# Patient Record
Sex: MALE | Race: WHITE | Employment: OTHER | URBAN - METROPOLITAN AREA
[De-identification: names, ages, dates, MRNs, and addresses within clinical notes are randomized per-mention and may not be internally consistent; named-entity substitution may affect disease eponyms.]

---

## 2017-01-02 ENCOUNTER — THERAPY VISIT (OUTPATIENT)
Dept: PHYSICAL THERAPY | Facility: CLINIC | Age: 57
End: 2017-01-02
Payer: OTHER MISCELLANEOUS

## 2017-01-02 DIAGNOSIS — M25.412 EFFUSION, LEFT SHOULDER: ICD-10-CM

## 2017-01-02 DIAGNOSIS — G89.18 ACUTE POSTOPERATIVE PAIN OF LEFT SHOULDER: ICD-10-CM

## 2017-01-02 DIAGNOSIS — M25.512 ACUTE PAIN OF LEFT SHOULDER: Primary | ICD-10-CM

## 2017-01-02 DIAGNOSIS — M25.512 ACUTE POSTOPERATIVE PAIN OF LEFT SHOULDER: ICD-10-CM

## 2017-01-02 PROCEDURE — 97110 THERAPEUTIC EXERCISES: CPT | Mod: GP | Performed by: PHYSICAL THERAPY ASSISTANT

## 2017-01-03 ENCOUNTER — TELEPHONE (OUTPATIENT)
Dept: FAMILY MEDICINE | Facility: CLINIC | Age: 57
End: 2017-01-03

## 2017-01-03 DIAGNOSIS — Z12.11 SCREEN FOR COLON CANCER: Primary | ICD-10-CM

## 2017-01-03 NOTE — TELEPHONE ENCOUNTER
Called the patient for Health Maintenance, agreed to FIT test screening. Order placed for provider to sign.     The patient request FIT be sent to home:  [address] [place] [address]

## 2017-01-04 PROCEDURE — 99000 SPECIMEN HANDLING OFFICE-LAB: CPT | Performed by: FAMILY MEDICINE

## 2017-01-04 PROCEDURE — 82274 ASSAY TEST FOR BLOOD FECAL: CPT | Mod: 90 | Performed by: FAMILY MEDICINE

## 2017-01-06 ENCOUNTER — THERAPY VISIT (OUTPATIENT)
Dept: PHYSICAL THERAPY | Facility: CLINIC | Age: 57
End: 2017-01-06
Payer: OTHER MISCELLANEOUS

## 2017-01-06 DIAGNOSIS — M25.412 EFFUSION, LEFT SHOULDER: ICD-10-CM

## 2017-01-06 DIAGNOSIS — Z12.11 SCREEN FOR COLON CANCER: ICD-10-CM

## 2017-01-06 DIAGNOSIS — M25.512 ACUTE POSTOPERATIVE PAIN OF LEFT SHOULDER: ICD-10-CM

## 2017-01-06 DIAGNOSIS — G89.18 ACUTE POSTOPERATIVE PAIN OF LEFT SHOULDER: ICD-10-CM

## 2017-01-06 DIAGNOSIS — M25.512 ACUTE PAIN OF LEFT SHOULDER: Primary | ICD-10-CM

## 2017-01-06 LAB — HEMOCCULT STL QL IA: NEGATIVE

## 2017-01-06 PROCEDURE — 97110 THERAPEUTIC EXERCISES: CPT | Mod: GP | Performed by: PHYSICAL THERAPIST

## 2017-01-06 NOTE — PROGRESS NOTES
"Subjective:    HPI                    Objective:    System    Physical Exam    General     ROS    Assessment/Plan:      SUBJECTIVE  Subjective: Pt states nothing more than an occasional \"throb,\" which does seem to be getting better.  Ice seems quite helpful.   Current Pain level: 3/10   Changes in function:  None     Adverse reaction to treatment or activity:  None    OBJECTIVE  Objective: PROM scaption 130, ER 30 (both limited by protocol).     ASSESSMENT  Joshua continues to require intervention to meet STG and LTG's: PT  Patient is progressing as expected.  Response to therapy has shown an improvement in  ROM   Progress made towards STG/LTG?  None    PLAN  Continue current treatment until MD allows progression of the treatment plan, which would be anticipated once the six week janet is passed next week.    PTA/ATC plan:  N/A    Please refer to the daily flowsheet for treatment today, total treatment time and time spent performing 1:1 timed codes.              "

## 2017-01-09 ENCOUNTER — THERAPY VISIT (OUTPATIENT)
Dept: PHYSICAL THERAPY | Facility: CLINIC | Age: 57
End: 2017-01-09
Payer: OTHER MISCELLANEOUS

## 2017-01-09 DIAGNOSIS — M25.512 ACUTE PAIN OF LEFT SHOULDER: Primary | ICD-10-CM

## 2017-01-09 DIAGNOSIS — M25.412 EFFUSION, LEFT SHOULDER: ICD-10-CM

## 2017-01-09 DIAGNOSIS — G89.18 ACUTE POSTOPERATIVE PAIN OF LEFT SHOULDER: ICD-10-CM

## 2017-01-09 DIAGNOSIS — M25.512 ACUTE POSTOPERATIVE PAIN OF LEFT SHOULDER: ICD-10-CM

## 2017-01-09 PROCEDURE — 97110 THERAPEUTIC EXERCISES: CPT | Mod: GP | Performed by: PHYSICAL THERAPIST

## 2017-01-12 ENCOUNTER — OFFICE VISIT (OUTPATIENT)
Dept: ORTHOPEDICS | Facility: CLINIC | Age: 57
End: 2017-01-12
Payer: OTHER MISCELLANEOUS

## 2017-01-12 ENCOUNTER — THERAPY VISIT (OUTPATIENT)
Dept: PHYSICAL THERAPY | Facility: CLINIC | Age: 57
End: 2017-01-12
Payer: OTHER MISCELLANEOUS

## 2017-01-12 VITALS — BODY MASS INDEX: 29.72 KG/M2 | HEIGHT: 72 IN | WEIGHT: 219.4 LBS | RESPIRATION RATE: 16 BRPM

## 2017-01-12 DIAGNOSIS — M25.512 ACUTE POSTOPERATIVE PAIN OF LEFT SHOULDER: ICD-10-CM

## 2017-01-12 DIAGNOSIS — G89.18 ACUTE POSTOPERATIVE PAIN OF LEFT SHOULDER: ICD-10-CM

## 2017-01-12 DIAGNOSIS — Z98.890 S/P COMPLETE REPAIR OF ROTATOR CUFF: Primary | ICD-10-CM

## 2017-01-12 DIAGNOSIS — M25.512 ACUTE PAIN OF LEFT SHOULDER: Primary | ICD-10-CM

## 2017-01-12 DIAGNOSIS — M25.412 EFFUSION, LEFT SHOULDER: ICD-10-CM

## 2017-01-12 PROCEDURE — 99024 POSTOP FOLLOW-UP VISIT: CPT | Performed by: ORTHOPAEDIC SURGERY

## 2017-01-12 PROCEDURE — 97110 THERAPEUTIC EXERCISES: CPT | Mod: GP | Performed by: PHYSICAL THERAPIST

## 2017-01-12 RX ORDER — OXYCODONE AND ACETAMINOPHEN 5; 325 MG/1; MG/1
1 TABLET ORAL EVERY 8 HOURS PRN
Qty: 40 TABLET | Refills: 0 | Status: SHIPPED | OUTPATIENT
Start: 2017-01-12 | End: 2017-02-20

## 2017-01-12 ASSESSMENT — PAIN SCALES - GENERAL: PAINLEVEL: NO PAIN (1)

## 2017-01-12 NOTE — MR AVS SNAPSHOT
After Visit Summary   1/12/2017    Joshua Gill    MRN: [id number]           Patient Information     Date Of Birth          1960        Visit Information        Provider Department      1/12/2017 3:30 PM Guru Motta MD [facility] [place]        Today's Diagnoses     S/P complete repair of rotator cuff    -  1       Care Instructions    Please remember to call and schedule a follow up appointment if one was recommended at your earliest convenience.  Orthopedics CLINIC HOURS TELEPHONE NUMBER   Dr. Kalia Melchor  Certified Medical Assistant   Monday & Wednesday   8am - 5pm  Thursday 1pm - 5pm  Friday 8am -11:30am Specialty schedulers:   [phone] to schedule your surgery.  Main Clinic:   [phone] to make an appointment with any provider.    Urgent Care locations:    [facility] Monday-Friday Closed  Saturday-Sunday 9am-5pm      Monday-Friday 12pm - 8pm  Saturday-Sunday 9am-5pm [phone]     If SURGERY has been recommended, please call our Specialty Schedulers at [phone] to schedule your procedure.    If you need a medication refill, please contact your pharmacy. Please allow 3 business days for your refill to be completed.    If an MRI or CT scan has been recommended, please call [facility] Schedulers at [phone] to schedule your appointment.  Use [employer] (secure e-mail communication and access to your chart) to send a message or to make an appointment. Please ask how you can sign up for [employer].  Your care team's suggested websites for health information:   [email or web address] : Up to date and easily searchable information on multiple topics.   [email or web address] : [place] dept of heatl, public health issues in [place], N1N1            Follow-ups after your visit        Additional Services     [facility] PT, HAND, AND CHIROPRACTIC REFERRAL       **This order will print in the [facility]  Scheduling Office**    Physical Therapy, Hand Therapy and Chiropractic Care are available through:    *[facility]  *[facility]  *[facility]    Call one number to schedule at any of the above locations: [phone].    Your provider has referred you to: Physical Therapy at [facility] or [facility]    Indication/Reason for Referral: s/p left shoulder rotator cuff repair, distal clavicle resection, subacromial decompression, labral debridement, biceps tenodesis   Onset of Illness: 12/1/16  Therapy Orders: Per Protocol or Clinical Pathway  Special Programs: None  Special Request: None    Sukhdeep Tarango      Additional Comments for the Therapist or Chiropractor: aggressive range of motion     Please be aware that coverage of these services is subject to the terms and limitations of your health insurance plan.  Call member services at your health plan with any benefit or coverage questions.      Please bring the following to your appointment:    *Your personal calendar for scheduling future appointments  *Comfortable clothing                  Your next 10 appointments already scheduled     Jan 16, 2017  9:30 AM   JONG Extremity with Fermin Grayson PTA   [facility] [place] [facility] ([facility] [place])    [address]  [place] [address]   [phone]            Jan 19, 2017 10:40 AM   JONG Extremity with Fermin Grayson PTA   [facility] [place] [facility] ([facility] [place])    [address]  [place] [address]   [phone]            Jan 23, 2017 10:45 AM   JONG Extremity with Avery Coffman PT   [facility] [place] [facility] ([facility] [place])    [address]  [place] [address]   [phone]            Jan 26, 2017 10:40 AM   JONG Extremity with Fermin Grayson PTA   [facility] [place] [facility] ([facility] [place])    [address]  [address]  [place] [address]   [phone]            Jan 30, 2017 10:50 AM   JONG Extremity with Fermin Grayson PTA   [facility] [place] [facility] ([facility] [place])    [address]  [address]  [place] [address]   [phone]            Feb 02, 2017 10:45 AM   JONG Extremity with Avery Coffman PT   [facility] [place] [facility] ([facility] [place])    [address]  [place] [address]   [phone]            Feb 06, 2017 10:50 AM   JONG Extremity with Fermin Grayson PTA   [facility] [place] [facility] ([facility] [place])    [address]  [place] [address]   [phone]            Feb 09, 2017 10:45 AM   JONG Extremity with Avery Coffman PT   [facility] [place] [facility] ([facility] [place])    [address]  [place] [address]   [phone]            Feb 23, 2017  3:30 PM   Return Visit with Guru Motta MD   [facility] [place] ([facility] [place])    [address]  [place] [address]   [phone]              Who to contact     If you have questions or need follow up information about today's clinic visit or your schedule please contact [facility] [place] directly at [phone].  Normal or non-critical lab and imaging results will be communicated to you by Pl[employer], letter or phone within 4 business days after the clinic has received the results. If you do not hear from us within 7 days, please contact the clinic through Plurchase[employer] or phone. If you have a critical or abnormal lab result, we will notify you by phone as soon as possible.  Submit refill requests through [employer] or call your pharmacy and they will forward the refill request to us. Please allow 3 business days for your refill to be completed.          Additional Information About Your Visit        [employer]  "Information     [employer] lets you send messages to your doctor, view your test results, renew your prescriptions, schedule appointments and more. To sign up, go to [email or web address] . Click on \"Log in\" on the left side of the screen, which will take you to the Welcome page. Then click on \"Sign up Now\" on the right side of the page.     You will be asked to enter the access code listed below, as well as some personal information. Please follow the directions to create your username and password.     Your access code is: [email or web address]  Expires: 3/1/2017 10:24 AM     Your access code will  in 90 days. If you need help or a new code, please call your [facility] clinic or [phone].        Care EveryWhere ID     This is your Care EveryWhere ID. This could be used by other organizations to access your [facility] medical records  [id number]        Your Vitals Were     Respirations Height BMI (Body Mass Index)             16 1.816 m (5' 11.5\") 30.18 kg/m2          Blood Pressure from Last 3 Encounters:   16 117/78   16 127/73   16 122/68    Weight from Last 3 Encounters:   17 99.519 kg (219 lb 6.4 oz)   12/15/16 95.255 kg (210 lb)   16 96.163 kg (212 lb)              We Performed the Following     [facility] PT, HAND, AND CHIROPRACTIC REFERRAL          Today's Medication Changes          These changes are accurate as of: 17  4:36 PM.  If you have any questions, ask your nurse or doctor.               These medicines have changed or have updated prescriptions.        Dose/Directions    oxyCODONE-acetaminophen 5-325 MG per tablet   Commonly known as:  PERCOCET   This may have changed:    - how much to take  - when to take this  - additional instructions   Used for:  S/P complete repair of rotator cuff   Changed by:  Guru Motta MD        Dose:  1 tablet   Take 1 tablet by mouth every 8 hours as needed for pain maximum 3 tablet(s) per day   Quantity:  40 tablet   Refills:  0       "      Where to get your medicines      Some of these will need a paper prescription and others can be bought over the counter.  Ask your nurse if you have questions.     Bring a paper prescription for each of these medications    - oxyCODONE-acetaminophen 5-325 MG per tablet             Primary Care Provider Office Phone # Fax #    Vinny Singh [phone]       [facility] [address] [address]        Thank you!     Thank you for choosing [facility]  for your care. Our goal is always to provide you with excellent care. Hearing back from our patients is one way we can continue to improve our services. Please take a few minutes to complete the written survey that you may receive in the mail after your visit with us. Thank you!             Your Updated Medication List - Protect others around you: Learn how to safely use, store and throw away your medicines at www.disposemymeds.org.          This list is accurate as of: 1/12/17  4:36 PM.  Always use your most recent med list.                   Brand Name Dispense Instructions for use    methocarbamol 750 MG tablet    ROBAXIN    60 tablet    Take 1 tablet (750 mg) by mouth every 6 hours as needed for muscle spasms (muscle spasm)       oxyCODONE-acetaminophen 5-325 MG per tablet    PERCOCET    40 tablet    Take 1 tablet by mouth every 8 hours as needed for pain maximum 3 tablet(s) per day       senna-docusate 8.6-50 MG per tablet    SENOKOT-S;PERICOLACE    60 tablet    Take 1-2 tablets by mouth 2 times daily Take while on oral narcotics to prevent or treat constipation.

## 2017-01-12 NOTE — Clinical Note
[facility] [place]  [address]  [place] [address] [phone]  WORKABILITY    [facility] Louie M.D. [place]        1/12/2017      RE: Joshua Gill    [address] [address]        To whom it may concern:     Joshua Gill is under my care for   1. S/P complete repair of rotator cuff        Work related injury: Yes       Date of injury: 8/8/16      Date of surgery: 12/1/16    Return to work date: 1/23/16   ** WITH RESTRICTIONS? Yes, with work restrictions: * Other: Light duties. No use of left arm, strict. No lifting reaching, pulling, pushing. Full non weightbearing with left arm.  DURATION OF LIMITATIONS:  6 weeks        Next appointment: 6 weeks        Electronically Signed (as below)  Dr. Guru Motta M.D.

## 2017-01-12 NOTE — PATIENT INSTRUCTIONS
Please remember to call and schedule a follow up appointment if one was recommended at your earliest convenience.  Orthopedics CLINIC HOURS TELEPHONE NUMBER   Dr. Kalia Melchor  Certified Medical Assistant   Monday & Wednesday   8am - 5pm  Thursday 1pm - 5pm  Friday 8am -11:30am Specialty schedulers:   [phone] to schedule your surgery.  Main Clinic:   [phone] to make an appointment with any provider.    Urgent Care locations:    [facility] Monday-Friday Closed  Saturday-Sunday 9am-5pm      Monday-Friday 12pm - 8pm  Saturday-Sunday 9am-5pm [phone]     If SURGERY has been recommended, please call our Specialty Schedulers at [phone] to schedule your procedure.    If you need a medication refill, please contact your pharmacy. Please allow 3 business days for your refill to be completed.    If an MRI or CT scan has been recommended, please call [facility] Schedulers at [phone] to schedule your appointment.  Use [employer] (secure e-mail communication and access to your chart) to send a message or to make an appointment. Please ask how you can sign up for [employer].  Your care team's suggested websites for health information:   [email or web address] : Up to date and easily searchable information on multiple topics.   [email or web address] : [place] dept of heat, public health issues in [place], N1N1

## 2017-01-12 NOTE — PROGRESS NOTES
CHIEF COMPLAINT:   Chief Complaint   Patient presents with     Surgical Followup     Left shoulder RCR, DCR, SAD, labral debridement, biceps tenodesis. DOS 12/1/16, 6 week PO. Patient states his shoulder is feeling sore but it is getting better. He has not been moving it. Therapist said it was pretty tight. He has been going to PT, 2 times a week, going well. He still has a problem sleeping. He takes 2 pain pills at night and extra strength tylenol during the day.      SURGICAL PROCEDURE: Left shoulder Rotator cuff repair - medium repair  1.  Left shoulder arthroscopy with rotator cuff repair, medium repair, double row technique.    2.  Left shoulder arthroscopic distal clavicle resection.    3.  Left shoulder arthroscopic proximal biceps tenodesis.    4.  Left shoulder arthroscopic subacromial decompression with partial acromioplasty.    5.  Left shoulder arthroscopic labral debridement.   DATE OF PROCEDURE: 12/1/2016      HISTORY OF PRESENT ILLNESS    Joshua Gill is a 56 year old male seen for postoperative follow-up of a left shoulder rotator cuff repair  Medium repair that occurred 6 weeks ago. Since surgery, patient states his shoulder is feeling a bit sore, but has been getting much better, rated a 1/10. He has not been moving it. The physical therapist said it was pretty stiff. Has been going to physical therapy, two times a week. Continued pain at night. Denies fevers, chills, night sweats. No wound problems. Compliant with weight bearing restrictions and elevation. There have been no issues since surgery. Denies numbness or tingling. Patient is taking 2 Percocet at night and Extra strength Tylenol during the day.    Present symptoms: mild pain.  Stiffness.  Pain severity: 1/10  Pain quality: aching  Frequency of symptoms: frequently  Exacerbating Factors: rotation and movement of shoulder  Relieving Factors: rest  Night Pain: Yes  Pain while at rest: No   Associated numbness or tingling: No     OR  FINDINGS:  Medium-sized tear of the supraspinatus with mild retraction.  Proximal biceps tendinosis with partial tearing, less than 25%, with medial subluxation.  Partial tearing of the superior fibers of the subscapularis with the remainder intact.  Type 1 SLAP tear.  Mild to moderate glenohumeral synovitis.  Mild glenohumeral arthrosis.  Moderate acromioclavicular arthrosis with prominent subacromial and subclavicular bone spurs and a type 3 acromion.  Thickened subacromial bursa.     Orthopedic PMH: none    Other PMH:  has a past medical history of No pertinent past medical history; PONV (postoperative nausea and vomiting); and Motion sickness. He also has no past medical history of Amblyopia, Arthritis, Nonsenile cataract, Diabetes (H), Diabetic retinopathy (H), Glaucoma, Hypertension, Macular degeneration, Retinal detachment, Strabismus, or Uveitis.  Patient Active Problem List    Diagnosis Date Noted     Left shoulder pain 12/10/2016     Priority: Medium     Effusion, left shoulder 12/10/2016     Priority: Medium     Acute postoperative pain of left shoulder 12/10/2016     Priority: Medium     Complete tear of left rotator cuff 10/10/2016     Priority: Medium     Tinea corporis 12/22/2014     SLAP tear of shoulder 10/27/2014     Impingement syndrome of right shoulder 04/29/2014     CARDIOVASCULAR SCREENING; LDL GOAL LESS THAN 160 03/02/2013     Situational anxiety 03/02/2013       Medications:   Current outpatient prescriptions:      oxyCODONE-acetaminophen (PERCOCET) 5-325 MG per tablet, Take 1-2 tablets by mouth every 4 hours as needed for pain maximum 10 tablet(s) per day, Disp: 60 tablet, Rfl: 0     methocarbamol (ROBAXIN) 750 MG tablet, Take 1 tablet (750 mg) by mouth every 6 hours as needed for muscle spasms (muscle spasm), Disp: 60 tablet, Rfl: 0     senna-docusate (SENOKOT-S;PERICOLACE) 8.6-50 MG per tablet, Take 1-2 tablets by mouth 2 times daily Take while on oral narcotics to prevent or treat  "constipation., Disp: 60 tablet, Rfl: 1    Allergies: No Known Allergies    REVIEW OF SYSTEMS:  CONSTITUTIONAL:NEGATIVE for fever, chills, change in weight  INTEGUMENTARY/SKIN: NEGATIVE for worrisome rashes, moles or lesions  MUSCULOSKELETAL:See HPI above  NEURO: NEGATIVE for weakness, dizziness or paresthesias      PHYSICAL EXAM:  Resp 16  Ht 1.816 m (5' 11.5\")  Wt 99.519 kg (219 lb 6.4 oz)  BMI 30.18 kg/m2   GENERAL APPEARANCE: healthy, alert, no distress   SKIN: no suspicious lesions or rashes  NEURO: Normal strength and tone, mentation intact and speech normal  PSYCH:  mentation appears normal and affect normal/bright  RESPIRATORY: No increased work of breathing.      left UPPER EXTREMITY:  Inspection: incisions healed.  Inspection: no swelling, no ecchymosis, no deformity  Palpation: tender over incisions  Strength: grossly intact , weak.  Range of motion: forward flexion 100 degrees, external rotation 30 degrees passive.    Sensation intact to light touch in median, radial, ulnar and axillary nerve distributions  Palpable 2+ radial pulse, brisk capillary refill to all fingers, wwp  Intact epl fpl fdp edc wrist flexion/extension biceps triceps deltoid    X-RAY:  none reviewed today.    Impression: 56 year old male 6 weeks postoperative left shoulder rotator cuff repair - medium repair, doing well.    Plan:  * Rest  * Activity modification - avoid activities that aggravate symptoms; No grabbing pushing, pulling, reaching.  * non weight bearing left upper extremity.  * NSAIDS - regular use for inflammation, with food, as long as no contra-indications. Please discuss with pcp if needed.  * Ice twice daily to three times daily.  * Physical Therapy per medium repair/tenodesis protocol for aggressive motion. No restrictions on range of motion.  * Prescription filled today: perocet  * Tylenol as needed for pain  * Workability update: able to return to work on 1/23/2017 with light duties, strict restriction of no " duties of the left arm.  * Return to clinic in 6 week, sooner as needed.    This document serves as a record of the services and decisions personally performed and made by Guru Motta MD. It was created on his behalf by Heaven Barcenas, a trained medical scribe. The creation of this document is based the provider's statements to the medical scribe.    Scribe Heaven Barcenas 4:05 PM 1/12/2017       Guru Motta M.D., M.S.  Dept. of Orthopaedic Surgery  [facility]

## 2017-01-12 NOTE — NURSING NOTE
"Chief Complaint   Patient presents with     Surgical Followup     Left shoulder RCR, DCR, SAD, labral debridement, biceps tenodesis. DOS 12/1/16, 6 week PO. Patient states his shoulder is feeling sore but it is getting better. He has not been moving it. Therapist said it was pretty tight. He has been going to PT, 2 times a week, going well. He still has a problem sleeping. He takes 2 pain pills at night and extra strength tylenol during the day.        Initial Resp 16  Ht 1.816 m (5' 11.5\")  Wt 99.519 kg (219 lb 6.4 oz)  BMI 30.18 kg/m2 Estimated body mass index is 30.18 kg/(m^2) as calculated from the following:    Height as of this encounter: 1.816 m (5' 11.5\").    Weight as of this encounter: 99.519 kg (219 lb 6.4 oz).  BP completed using cuff size: NA (Not Taken)  Jill Baires Certified Medical Assistant    "

## 2017-01-13 ENCOUNTER — TELEPHONE (OUTPATIENT)
Dept: PHYSICAL THERAPY | Facility: CLINIC | Age: 57
End: 2017-01-13

## 2017-01-13 DIAGNOSIS — M25.512 ACUTE PAIN OF LEFT SHOULDER: Primary | ICD-10-CM

## 2017-01-13 DIAGNOSIS — G89.18 ACUTE POSTOPERATIVE PAIN OF LEFT SHOULDER: ICD-10-CM

## 2017-01-13 DIAGNOSIS — M25.512 ACUTE POSTOPERATIVE PAIN OF LEFT SHOULDER: ICD-10-CM

## 2017-01-13 DIAGNOSIS — M25.412 EFFUSION, LEFT SHOULDER: ICD-10-CM

## 2017-01-13 NOTE — PROGRESS NOTES
"Subjective:    HPI                    Objective:    System    Physical Exam    General     ROS    Assessment/Plan:      PROGRESS  REPORT    Progress reporting period is from 12/10/2016 to today.       SUBJECTIVE  Subjective: Pt reports he continues to do well.  Although still taking pain medication, is not noting \"significant\" discomfort.  Eager to be done with the sling.    Current Pain level: 1/10.     Initial Pain level: 3/10.   Changes in function:  None  Adverse reaction to treatment or activity: None    OBJECTIVE  Objective: PROM flexion 118, scaption 132, abduction 117, ER 35, IR L4.     ASSESSMENT/PLAN  Updated problem list and treatment plan: Diagnosis 1:  S/p cuff repair  Pain -  hot/cold therapy  Decreased ROM/flexibility - manual therapy and therapeutic exercise  Decreased joint mobility - manual therapy and therapeutic exercise  Decreased function - therapeutic activities  STG/LTGs have been met or progress has been made towards goals:  Yes (See Goal flow sheet completed today.)  Assessment of Progress: The patient's condition is improving.  Self Management Plans:  Patient has been instructed in a home treatment program.  I have re-evaluated this patient and find that the nature, scope, duration and intensity of the therapy is appropriate for the medical condition of the patient.  Joshua continues to require the following intervention to meet STG and LTG's:  PT    Recommendations:  Pt has reached six weeks post operatively.  Moved to next stage of protocol today and provided verbal update to Dr Motta re: limited ROM as noted above.  Anticipate progression with focus on terminal ROM unless advised otherwise.    Please refer to the daily flowsheet for treatment today, total treatment time and time spent performing 1:1 timed codes.                "

## 2017-01-16 ENCOUNTER — THERAPY VISIT (OUTPATIENT)
Dept: PHYSICAL THERAPY | Facility: CLINIC | Age: 57
End: 2017-01-16
Payer: OTHER MISCELLANEOUS

## 2017-01-16 DIAGNOSIS — M25.512 ACUTE PAIN OF LEFT SHOULDER: Primary | ICD-10-CM

## 2017-01-16 DIAGNOSIS — G89.18 ACUTE POSTOPERATIVE PAIN OF LEFT SHOULDER: ICD-10-CM

## 2017-01-16 DIAGNOSIS — M25.412 EFFUSION, LEFT SHOULDER: ICD-10-CM

## 2017-01-16 DIAGNOSIS — M25.512 ACUTE POSTOPERATIVE PAIN OF LEFT SHOULDER: ICD-10-CM

## 2017-01-16 PROCEDURE — 97110 THERAPEUTIC EXERCISES: CPT | Mod: GP | Performed by: PHYSICAL THERAPY ASSISTANT

## 2017-01-16 NOTE — PROGRESS NOTES
Subjective:    HPI                    Objective:    System    Physical Exam    General     ROS    Assessment/Plan:      SUBJECTIVE  Subjective changes as noted by pt:  Pt saw his MD last week and pt hopes to be RTW with some left arm movement. He knows the MD has not allowed that yet. But, pt is feeling he could be doing more with the left arm once PT/MD allows him to. No increased pain noted by pt in the left arm.    Current pain level:  1/10   Changes in function:  Yes (See Goal flowsheet attached for changes in current functional level)     Adverse reaction to treatment or activity:  None    OBJECTIVE  Changes in objective findings:  Pt is 6+ wks post-op. PROM: left shoulder flexion 121, abd 121, ER 39, IR L3.  HEP: added isometrics for left shoulder all 6 motions today.      ASSESSMENT  Joshua continues to require intervention to meet STG and LTG's: PT  Patient is progressing as expected.  Response to therapy has shown an improvement in  ROM   Progress made towards STG/LTG?  Yes (See Goal flowsheet attached for updates on achievement of STG and LTG)    PLAN  Continue current treatment plan until patient demonstrates readiness to progress to higher level exercises.    PTA/ATC plan:  Will continue with present plan of care.    Please refer to the daily flowsheet for treatment today, total treatment time and time spent performing 1:1 timed codes.

## 2017-01-19 ENCOUNTER — THERAPY VISIT (OUTPATIENT)
Dept: PHYSICAL THERAPY | Facility: CLINIC | Age: 57
End: 2017-01-19
Payer: OTHER MISCELLANEOUS

## 2017-01-19 DIAGNOSIS — M25.412 EFFUSION, LEFT SHOULDER: ICD-10-CM

## 2017-01-19 DIAGNOSIS — M25.512 ACUTE PAIN OF LEFT SHOULDER: Primary | ICD-10-CM

## 2017-01-19 DIAGNOSIS — M25.512 ACUTE POSTOPERATIVE PAIN OF LEFT SHOULDER: ICD-10-CM

## 2017-01-19 DIAGNOSIS — G89.18 ACUTE POSTOPERATIVE PAIN OF LEFT SHOULDER: ICD-10-CM

## 2017-01-19 PROCEDURE — 97110 THERAPEUTIC EXERCISES: CPT | Mod: GP | Performed by: PHYSICAL THERAPY ASSISTANT

## 2017-01-23 ENCOUNTER — THERAPY VISIT (OUTPATIENT)
Dept: PHYSICAL THERAPY | Facility: CLINIC | Age: 57
End: 2017-01-23
Payer: OTHER MISCELLANEOUS

## 2017-01-23 DIAGNOSIS — M25.412 EFFUSION, LEFT SHOULDER: ICD-10-CM

## 2017-01-23 DIAGNOSIS — M25.512 ACUTE PAIN OF LEFT SHOULDER: Primary | ICD-10-CM

## 2017-01-23 DIAGNOSIS — M25.512 ACUTE POSTOPERATIVE PAIN OF LEFT SHOULDER: ICD-10-CM

## 2017-01-23 DIAGNOSIS — G89.18 ACUTE POSTOPERATIVE PAIN OF LEFT SHOULDER: ICD-10-CM

## 2017-01-23 PROCEDURE — 97110 THERAPEUTIC EXERCISES: CPT | Mod: GP | Performed by: PHYSICAL THERAPIST

## 2017-01-23 PROCEDURE — 97140 MANUAL THERAPY 1/> REGIONS: CPT | Mod: GP | Performed by: PHYSICAL THERAPIST

## 2017-01-23 NOTE — PROGRESS NOTES
Subjective:    HPI                    Objective:    System    Physical Exam    General     ROS    Assessment/Plan:      SUBJECTIVE  Subjective: Pt reports generally doing OK.  Hasn't returned to work yet but targeting soon.  Sore sleeping only if lays on L side.   Current Pain level: 1/10   Changes in function:  None     Adverse reaction to treatment or activity:  None    OBJECTIVE  Objective: PROM flexion 134, abduction 107, ER 45, IR L1.     ASSESSMENT  Joshua continues to require intervention to meet STG and LTG's: PT  Would like to see greater PROM progress as protocol would base advancing to AROM on this.  Progress made towards STG/LTG?  None    PLAN  Continue current treatment plan until patient demonstrates readiness to progress to higher level exercises.    PTA/ATC plan:  N/A    Please refer to the daily flowsheet for treatment today, total treatment time and time spent performing 1:1 timed codes.

## 2017-01-26 ENCOUNTER — THERAPY VISIT (OUTPATIENT)
Dept: PHYSICAL THERAPY | Facility: CLINIC | Age: 57
End: 2017-01-26
Payer: OTHER MISCELLANEOUS

## 2017-01-26 DIAGNOSIS — M25.512 ACUTE POSTOPERATIVE PAIN OF LEFT SHOULDER: ICD-10-CM

## 2017-01-26 DIAGNOSIS — M25.512 ACUTE PAIN OF LEFT SHOULDER: Primary | ICD-10-CM

## 2017-01-26 DIAGNOSIS — G89.18 ACUTE POSTOPERATIVE PAIN OF LEFT SHOULDER: ICD-10-CM

## 2017-01-26 DIAGNOSIS — M25.412 EFFUSION, LEFT SHOULDER: ICD-10-CM

## 2017-01-26 PROCEDURE — 97140 MANUAL THERAPY 1/> REGIONS: CPT | Mod: GP | Performed by: PHYSICAL THERAPY ASSISTANT

## 2017-01-26 PROCEDURE — 97110 THERAPEUTIC EXERCISES: CPT | Mod: GP | Performed by: PHYSICAL THERAPY ASSISTANT

## 2017-01-30 ENCOUNTER — THERAPY VISIT (OUTPATIENT)
Dept: PHYSICAL THERAPY | Facility: CLINIC | Age: 57
End: 2017-01-30
Payer: OTHER MISCELLANEOUS

## 2017-01-30 DIAGNOSIS — M25.412 EFFUSION, LEFT SHOULDER: ICD-10-CM

## 2017-01-30 DIAGNOSIS — G89.18 ACUTE POSTOPERATIVE PAIN OF LEFT SHOULDER: ICD-10-CM

## 2017-01-30 DIAGNOSIS — M25.512 ACUTE PAIN OF LEFT SHOULDER: Primary | ICD-10-CM

## 2017-01-30 DIAGNOSIS — M25.512 ACUTE POSTOPERATIVE PAIN OF LEFT SHOULDER: ICD-10-CM

## 2017-01-30 PROCEDURE — 97140 MANUAL THERAPY 1/> REGIONS: CPT | Mod: GP | Performed by: PHYSICAL THERAPY ASSISTANT

## 2017-01-30 PROCEDURE — 97110 THERAPEUTIC EXERCISES: CPT | Mod: GP | Performed by: PHYSICAL THERAPY ASSISTANT

## 2017-01-30 NOTE — PROGRESS NOTES
Subjective:    HPI                    Objective:    System    Physical Exam    General     ROS    Assessment/Plan:      SUBJECTIVE  Subjective changes as noted by pt:  Pt does his pulleys 3x/day and it remains so stiff in the AM hours.    Current pain level:  3/10   Changes in function:  None     Adverse reaction to treatment or activity:  None    OBJECTIVE  Changes in objective findings:  8+ wks post-op.  PROM: left shoulder  flexion 139, abduction 112.      ASSESSMENT  Joshua continues to require intervention to meet STG and LTG's: PT  Patient is progressing as expected.  Response to therapy has shown an improvement in  ROM   Progress made towards STG/LTG?  None    PLAN  Continue current treatment until MD allows progression of the treatment plan.    PTA/ATC plan:  Will continue with present plan of care.    Please refer to the daily flowsheet for treatment today, total treatment time and time spent performing 1:1 timed codes.

## 2017-02-02 ENCOUNTER — THERAPY VISIT (OUTPATIENT)
Dept: PHYSICAL THERAPY | Facility: CLINIC | Age: 57
End: 2017-02-02
Payer: OTHER MISCELLANEOUS

## 2017-02-02 DIAGNOSIS — G89.18 ACUTE POSTOPERATIVE PAIN OF LEFT SHOULDER: ICD-10-CM

## 2017-02-02 DIAGNOSIS — M25.512 ACUTE PAIN OF LEFT SHOULDER: Primary | ICD-10-CM

## 2017-02-02 DIAGNOSIS — M25.412 EFFUSION, LEFT SHOULDER: ICD-10-CM

## 2017-02-02 DIAGNOSIS — M25.512 ACUTE POSTOPERATIVE PAIN OF LEFT SHOULDER: ICD-10-CM

## 2017-02-02 PROCEDURE — 97140 MANUAL THERAPY 1/> REGIONS: CPT | Mod: GP | Performed by: PHYSICAL THERAPIST

## 2017-02-02 PROCEDURE — 97110 THERAPEUTIC EXERCISES: CPT | Mod: GP | Performed by: PHYSICAL THERAPIST

## 2017-02-02 NOTE — PROGRESS NOTES
Subjective:    HPI                    Objective:    System    Physical Exam    General     ROS    Assessment/Plan:      SUBJECTIVE  Subjective: Pt reports he feels he is getting better.  Notes motion is improving in all directions and not as uncomfortable.   Current Pain level: 2/10   Changes in function:  None     Adverse reaction to treatment or activity:  None    OBJECTIVE  Objective: PROM flexion 141, abduction 130, ER 49, IR L1.  AROM R shoulder flexion 143, abduction 140, ER 65, IR T12.  After session 148, abduction 141, ER 58.     ASSESSMENT  Joshua continues to require intervention to meet STG and LTG's: PT  PROM L is improving and nearing AROM of R.  Response to therapy has shown an improvement in  ROM   Progress made towards STG/LTG?  None    PLAN  Given nearing PROM symmetry, consider addition of HEP including AAROM next session.    PTA/ATC plan:  N/A    Please refer to the daily flowsheet for treatment today, total treatment time and time spent performing 1:1 timed codes.

## 2017-02-06 ENCOUNTER — THERAPY VISIT (OUTPATIENT)
Dept: PHYSICAL THERAPY | Facility: CLINIC | Age: 57
End: 2017-02-06
Payer: OTHER MISCELLANEOUS

## 2017-02-06 DIAGNOSIS — G89.18 ACUTE POSTOPERATIVE PAIN OF LEFT SHOULDER: ICD-10-CM

## 2017-02-06 DIAGNOSIS — M25.412 EFFUSION, LEFT SHOULDER: ICD-10-CM

## 2017-02-06 DIAGNOSIS — M25.512 ACUTE PAIN OF LEFT SHOULDER: Primary | ICD-10-CM

## 2017-02-06 DIAGNOSIS — M25.512 ACUTE POSTOPERATIVE PAIN OF LEFT SHOULDER: ICD-10-CM

## 2017-02-06 PROCEDURE — 97140 MANUAL THERAPY 1/> REGIONS: CPT | Mod: GP | Performed by: PHYSICAL THERAPY ASSISTANT

## 2017-02-06 PROCEDURE — 97110 THERAPEUTIC EXERCISES: CPT | Mod: GP | Performed by: PHYSICAL THERAPY ASSISTANT

## 2017-02-09 ENCOUNTER — THERAPY VISIT (OUTPATIENT)
Dept: PHYSICAL THERAPY | Facility: CLINIC | Age: 57
End: 2017-02-09
Payer: OTHER MISCELLANEOUS

## 2017-02-09 DIAGNOSIS — M25.512 ACUTE PAIN OF LEFT SHOULDER: Primary | ICD-10-CM

## 2017-02-09 DIAGNOSIS — M25.512 ACUTE POSTOPERATIVE PAIN OF LEFT SHOULDER: ICD-10-CM

## 2017-02-09 DIAGNOSIS — M25.412 EFFUSION, LEFT SHOULDER: ICD-10-CM

## 2017-02-09 DIAGNOSIS — G89.18 ACUTE POSTOPERATIVE PAIN OF LEFT SHOULDER: ICD-10-CM

## 2017-02-09 PROCEDURE — 97112 NEUROMUSCULAR REEDUCATION: CPT | Mod: GP | Performed by: PHYSICAL THERAPIST

## 2017-02-09 PROCEDURE — 97110 THERAPEUTIC EXERCISES: CPT | Mod: GP | Performed by: PHYSICAL THERAPIST

## 2017-02-09 NOTE — PROGRESS NOTES
Subjective:    HPI                    Objective:    System    Physical Exam    General     ROS    Assessment/Plan:      SUBJECTIVE  Subjective: Pt reports he still feels worst at night but feels like motion surely isn't regressing.  Doing well with new exercises.   Current Pain level: 2/10   Changes in function:  Yes (See Goal flowsheet attached for changes in current functional level)     Adverse reaction to treatment or activity:  None    OBJECTIVE    Left Shoulder AROM PROM   Flexion 119 154   Abduction 117 153   External Rotation 59 61   Internal Rotation L2 L1       ASSESSMENT  Joshua continues to require intervention to meet STG and LTG's: PT  Patient is progressing as expected.  Response to therapy has shown an improvement in  ROM   Progress made towards STG/LTG?  Yes (See Goal flowsheet attached for updates on achievement of STG and LTG)    PLAN  Initiated AROM today given PROM plateau to end range.  Assess response.    PTA/ATC plan:  N/A    Please refer to the daily flowsheet for treatment today, total treatment time and time spent performing 1:1 timed codes.

## 2017-02-13 ENCOUNTER — THERAPY VISIT (OUTPATIENT)
Dept: PHYSICAL THERAPY | Facility: CLINIC | Age: 57
End: 2017-02-13
Payer: OTHER MISCELLANEOUS

## 2017-02-13 DIAGNOSIS — M25.412 EFFUSION, LEFT SHOULDER: ICD-10-CM

## 2017-02-13 DIAGNOSIS — G89.18 ACUTE POSTOPERATIVE PAIN OF LEFT SHOULDER: ICD-10-CM

## 2017-02-13 DIAGNOSIS — M25.512 ACUTE POSTOPERATIVE PAIN OF LEFT SHOULDER: ICD-10-CM

## 2017-02-13 DIAGNOSIS — M25.512 ACUTE PAIN OF LEFT SHOULDER: ICD-10-CM

## 2017-02-13 PROCEDURE — 97110 THERAPEUTIC EXERCISES: CPT | Mod: GP | Performed by: PHYSICAL THERAPY ASSISTANT

## 2017-02-13 PROCEDURE — 97112 NEUROMUSCULAR REEDUCATION: CPT | Mod: GP | Performed by: PHYSICAL THERAPY ASSISTANT

## 2017-02-13 NOTE — MR AVS SNAPSHOT
After Visit Summary   2/13/2017    Joshua Gill    MRN: [id number]           Patient Information     Date Of Birth          1960        Visit Information        Provider Department      2/13/2017 10:50 AM Fermin Grayson PTA [facility] [place] [facility]        Today's Diagnoses     Acute pain of left shoulder        Effusion, left shoulder        Acute postoperative pain of left shoulder           Follow-ups after your visit        Your next 10 appointments already scheduled     Feb 16, 2017  9:25 AM CST   JONG Extremity with Avery Coffman PT   [facility] [place] [facility] ([facility] [place])    [address]  [place] [address]   [phone]            Feb 20, 2017  8:50 AM CST   JONG Extremity with Fermin Grayson PTA   [facility] [place] [facility] ([facility])    [address]  [place] [address]   [phone]            Feb 20, 2017 10:00 AM CST   Return Visit with Guru Motta MD   [facility] [place] ([facility] [place])    [address]  [place] [address]   [phone]            Feb 23, 2017  2:35 PM CST   JONG Extremity with Avery Coffman PT   [facility] [place] [facility] ([facility])    [address]  [place] [address]   [phone]              Who to contact     If you have questions or need follow up information about today's clinic visit or your schedule please contact [facility] [place] [facility] directly at [phone].  Normal or non-critical lab and imaging results will be communicated to you by MyChart, letter or phone within 4 business days after the clinic has received the results. If you do not hear from us within 7 days, please contact the clinic through MyChart or phone. If you have a critical or abnormal lab result, we will notify you by phone as  "soon as possible.  Submit refill requests through [employer] or call your pharmacy and they will forward the refill request to us. Please allow 3 business days for your refill to be completed.          Additional Information About Your Visit        [employer] Information     [employer] lets you send messages to your doctor, view your test results, renew your prescriptions, schedule appointments and more. To sign up, go to [email or web address] . Click on \"Log in\" on the left side of the screen, which will take you to the Welcome page. Then click on \"Sign up Now\" on the right side of the page.     You will be asked to enter the access code listed below, as well as some personal information. Please follow the directions to create your username and password.     Your access code is: [email or web address]  Expires: 3/1/2017 10:24 AM     Your access code will  in 90 days. If you need help or a new code, please call your [facility] clinic or [phone].        Care EveryWhere ID     This is your Care EveryWhere ID. This could be used by other organizations to access your [facility] medical records  [id number]         Blood Pressure from Last 3 Encounters:   16 117/78   16 127/73   16 122/68    Weight from Last 3 Encounters:   17 99.5 kg (219 lb 6.4 oz)   12/15/16 95.3 kg (210 lb)   16 96.2 kg (212 lb)              We Performed the Following     Neuromuscular Re-Education     Therapeutic Exercises        Primary Care Provider Office Phone # Fax #    Vinny Singh [phone]       [facility] [address] [address] [address]        Thank you!     Thank you for choosing [facility]  for your care. Our goal is always to provide you with excellent care. Hearing back from our patients is one way we can continue to improve our services. Please take a few minutes to complete the written survey that you may receive in the mail after your visit " with us. Thank you!             Your Updated Medication List - Protect others around you: Learn how to safely use, store and throw away your medicines at www.disposemymeds.org.          This list is accurate as of: 2/13/17 11:59 AM.  Always use your most recent med list.                   Brand Name Dispense Instructions for use    methocarbamol 750 MG tablet    ROBAXIN    60 tablet    Take 1 tablet (750 mg) by mouth every 6 hours as needed for muscle spasms (muscle spasm)       oxyCODONE-acetaminophen 5-325 MG per tablet    PERCOCET    40 tablet    Take 1 tablet by mouth every 8 hours as needed for pain maximum 3 tablet(s) per day       senna-docusate 8.6-50 MG per tablet    SENOKOT-S;PERICOLACE    60 tablet    Take 1-2 tablets by mouth 2 times daily Take while on oral narcotics to prevent or treat constipation.

## 2017-02-16 ENCOUNTER — THERAPY VISIT (OUTPATIENT)
Dept: PHYSICAL THERAPY | Facility: CLINIC | Age: 57
End: 2017-02-16
Payer: OTHER MISCELLANEOUS

## 2017-02-16 DIAGNOSIS — M25.412 EFFUSION, LEFT SHOULDER: ICD-10-CM

## 2017-02-16 DIAGNOSIS — M25.512 ACUTE PAIN OF LEFT SHOULDER: ICD-10-CM

## 2017-02-16 DIAGNOSIS — G89.18 ACUTE POSTOPERATIVE PAIN OF LEFT SHOULDER: ICD-10-CM

## 2017-02-16 DIAGNOSIS — M25.512 ACUTE POSTOPERATIVE PAIN OF LEFT SHOULDER: ICD-10-CM

## 2017-02-16 PROCEDURE — 97112 NEUROMUSCULAR REEDUCATION: CPT | Mod: GP | Performed by: PHYSICAL THERAPIST

## 2017-02-16 PROCEDURE — 97110 THERAPEUTIC EXERCISES: CPT | Mod: GP | Performed by: PHYSICAL THERAPIST

## 2017-02-16 NOTE — PROGRESS NOTES
"Subjective:    HPI                    Objective:    System    Physical Exam    General     ROS    Assessment/Plan:      PROGRESS  REPORT    Progress reporting period is from 12/10/2016 to today.       SUBJECTIVE  Subjective: \"I know it's changing.\"  Easier to move the arm with less discomfort.  Most discomfort is overnight.    Current Pain level:  (\"it's not bad\").     Initial Pain level: 3/10.   Changes in function:  Yes (See Goal flowsheet attached for changes in current functional level)  Adverse reaction to treatment or activity: None    OBJECTIVE      Left Shoulder AROM PROM MMT   Flexion 154 163 3+/5   Abduction 142 154 3+/5   External Rotation 59 63 3/5   Internal Rotation L2 L1 4+/5   Compensatory shoulder shrug at end range active shoulder flexion and abduction.    ASSESSMENT/PLAN  Updated problem list and treatment plan: Diagnosis 1:  S/p L cuff repair  Pain -  hot/cold therapy  Decreased ROM/flexibility - manual therapy and therapeutic exercise  Decreased strength - therapeutic exercise and therapeutic activities  Impaired muscle performance - neuro re-education  Decreased function - therapeutic activities  STG/LTGs have been met or progress has been made towards goals:  Yes (See Goal flow sheet completed today.)  Assessment of Progress: The patient's condition is improving.  Self Management Plans:  Patient is independent in a home treatment program.  I have re-evaluated this patient and find that the nature, scope, duration and intensity of the therapy is appropriate for the medical condition of the patient.  Joshua continues to require the following intervention to meet STG and LTG's:  PT    Recommendations:  Pt has f/u with Dr Motta 02/20.  If pt returns to work even in restricted fashion, pt anticipates continuation of PT in that setting.  Anticipate contiunation along protocol unless advised otherwise here or there.    Please refer to the daily flowsheet for treatment today, total treatment time and " time spent performing 1:1 timed codes.

## 2017-02-16 NOTE — MR AVS SNAPSHOT
After Visit Summary   2/16/2017    Joshua Gill    MRN: [id number]           Patient Information     Date Of Birth          1960        Visit Information        Provider Department      2/16/2017 9:25 AM Avery Coffman, PT [facility] [place] [facility]        Today's Diagnoses     Acute pain of left shoulder        Effusion, left shoulder        Acute postoperative pain of left shoulder           Follow-ups after your visit        Your next 10 appointments already scheduled     Feb 20, 2017  8:50 AM CST   JONG Extremity with Fermin Grayson PTA   [facility] [place] [facility] ([facility] [place])    [address]  [address]  [place] [address]   [phone]            Feb 20, 2017 10:00 AM CST   Return Visit with Guru Motta MD   [facility] [place] ([facility] [place])    [address]  [address]  [place] [address]   [phone]            Feb 23, 2017  2:35 PM CST   JONG Extremity with Avery Coffman PT   [facility] [place] [facility] ([facility])    [address]  [place] [address]   [phone]              Who to contact     If you have questions or need follow up information about today's clinic visit or your schedule please contact [facility] [place] [facility] directly at [phone].  Normal or non-critical lab and imaging results will be communicated to you by MyChart, letter or phone within 4 business days after the clinic has received the results. If you do not hear from us within 7 days, please contact the clinic through MyChart or phone. If you have a critical or abnormal lab result, we will notify you by phone as soon as possible.  Submit refill requests through [employer] or call your pharmacy and they will forward the refill request to us. Please allow 3 business days for your refill to be completed.          Additional Information  "About Your Visit        MyChart Information     [employer] lets you send messages to your doctor, view your test results, renew your prescriptions, schedule appointments and more. To sign up, go to [email or web address] . Click on \"Log in\" on the left side of the screen, which will take you to the Welcome page. Then click on \"Sign up Now\" on the right side of the page.     You will be asked to enter the access code listed below, as well as some personal information. Please follow the directions to create your username and password.     Your access code is: [email or web address]  Expires: 3/1/2017 10:24 AM     Your access code will  in 90 days. If you need help or a new code, please call your [place] clinic or [phone].        Care EveryWhere ID     This is your Care EveryWhere ID. This could be used by other organizations to access your [place] medical records  [id number]         Blood Pressure from Last 3 Encounters:   16 117/78   16 127/73   16 122/68    Weight from Last 3 Encounters:   17 99.5 kg (219 lb 6.4 oz)   12/15/16 95.3 kg (210 lb)   16 96.2 kg (212 lb)              We Performed the Following     Neuromuscular Re-Education     Therapeutic Exercises        Primary Care Provider Office Phone # Fax #    Vinny Singh [phone]       [facility] [address] [address] [address]        Thank you!     Thank you for choosing [facility]  for your care. Our goal is always to provide you with excellent care. Hearing back from our patients is one way we can continue to improve our services. Please take a few minutes to complete the written survey that you may receive in the mail after your visit with us. Thank you!             Your Updated Medication List - Protect others around you: Learn how to safely use, store and throw away your medicines at www.disposemymeds.org.          This list is accurate as of: 17 " 10:05 AM.  Always use your most recent med list.                   Brand Name Dispense Instructions for use    methocarbamol 750 MG tablet    ROBAXIN    60 tablet    Take 1 tablet (750 mg) by mouth every 6 hours as needed for muscle spasms (muscle spasm)       oxyCODONE-acetaminophen 5-325 MG per tablet    PERCOCET    40 tablet    Take 1 tablet by mouth every 8 hours as needed for pain maximum 3 tablet(s) per day       senna-docusate 8.6-50 MG per tablet    SENOKOT-S;PERICOLACE    60 tablet    Take 1-2 tablets by mouth 2 times daily Take while on oral narcotics to prevent or treat constipation.

## 2017-02-20 ENCOUNTER — THERAPY VISIT (OUTPATIENT)
Dept: PHYSICAL THERAPY | Facility: CLINIC | Age: 57
End: 2017-02-20
Payer: OTHER MISCELLANEOUS

## 2017-02-20 ENCOUNTER — OFFICE VISIT (OUTPATIENT)
Dept: ORTHOPEDICS | Facility: CLINIC | Age: 57
End: 2017-02-20
Payer: OTHER MISCELLANEOUS

## 2017-02-20 VITALS — HEIGHT: 72 IN | WEIGHT: 219.4 LBS | RESPIRATION RATE: 16 BRPM | BODY MASS INDEX: 29.72 KG/M2

## 2017-02-20 DIAGNOSIS — Z98.890 S/P ROTATOR CUFF REPAIR: Primary | ICD-10-CM

## 2017-02-20 DIAGNOSIS — M25.512 ACUTE POSTOPERATIVE PAIN OF LEFT SHOULDER: ICD-10-CM

## 2017-02-20 DIAGNOSIS — M25.512 ACUTE PAIN OF LEFT SHOULDER: ICD-10-CM

## 2017-02-20 DIAGNOSIS — G89.18 ACUTE POSTOPERATIVE PAIN OF LEFT SHOULDER: ICD-10-CM

## 2017-02-20 DIAGNOSIS — M25.412 EFFUSION, LEFT SHOULDER: ICD-10-CM

## 2017-02-20 PROCEDURE — 97112 NEUROMUSCULAR REEDUCATION: CPT | Mod: GP | Performed by: PHYSICAL THERAPY ASSISTANT

## 2017-02-20 PROCEDURE — 99024 POSTOP FOLLOW-UP VISIT: CPT | Performed by: ORTHOPAEDIC SURGERY

## 2017-02-20 PROCEDURE — 97110 THERAPEUTIC EXERCISES: CPT | Mod: GP | Performed by: PHYSICAL THERAPY ASSISTANT

## 2017-02-20 ASSESSMENT — PAIN SCALES - GENERAL: PAINLEVEL: NO PAIN (0)

## 2017-02-20 NOTE — NURSING NOTE
"Chief Complaint   Patient presents with     Surgical Followup     WORK COMP. Left shoulder RCR - medium, SAD, DCR, LD, proximal BT. DOI 8/8/16. DOS 12/1/16. Patient states his shoulder is feeling good. Still has trouble sleeping at night. He continues to go to PT, 2 times a week, going well.        Initial Resp 16  Ht 1.816 m (5' 11.5\")  Wt 99.5 kg (219 lb 6.4 oz)  BMI 30.17 kg/m2 Estimated body mass index is 30.17 kg/(m^2) as calculated from the following:    Height as of this encounter: 1.816 m (5' 11.5\").    Weight as of this encounter: 99.5 kg (219 lb 6.4 oz).  Medication Reconciliation: yovani Baires Certified Medical Assistant    "

## 2017-02-20 NOTE — PROGRESS NOTES
CHIEF COMPLAINT:   Chief Complaint   Patient presents with     Surgical Followup     WORK COMP. Left shoulder RCR - medium, SAD, DCR, LD, proximal BT. DOI 8/8/16. DOS 12/1/16. Patient states his shoulder is feeling good. Still has trouble sleeping at night. He continues to go to PT, 2 times a week, going well.      SURGICAL PROCEDURE: Left shoulder Rotator cuff repair - medium repair  1.  Left shoulder arthroscopy with rotator cuff repair, medium repair, double row technique.    2.  Left shoulder arthroscopic distal clavicle resection.    3.  Left shoulder arthroscopic proximal biceps tenodesis.    4.  Left shoulder arthroscopic subacromial decompression with partial acromioplasty.    5.  Left shoulder arthroscopic labral debridement.   DATE OF PROCEDURE: 12/1/2016      HISTORY OF PRESENT ILLNESS    Joshua Gill is a 56 year old male seen for postoperative follow-up of a left shoulder rotator cuff repair  Medium repair that occurred 11.5 weeks ago. Since surgery, patient states his shoulder is feeling better, rated a 0/10. He has not been using it. Continues to go to physical therapy, twice weekly. States that physical therapy has been going well. Does still experience trouble sleeping at night. Denies fevers, chills, night sweats. No wound problems. Compliant with weight bearing restrictions and elevation. There have been no issues since surgery. Denies numbness or tingling.    Present symptoms: mild pain.  Stiffness.  Pain severity: 0/10  Pain quality: aching  Frequency of symptoms: frequently  Exacerbating Factors: rotation and movement of shoulder  Relieving Factors: rest  Night Pain: Yes  Pain while at rest: No   Associated numbness or tingling: No     OR FINDINGS:  Medium-sized tear of the supraspinatus with mild retraction.  Proximal biceps tendinosis with partial tearing, less than 25%, with medial subluxation.  Partial tearing of the superior fibers of the subscapularis with the remainder intact.   "Type 1 SLAP tear.  Mild to moderate glenohumeral synovitis.  Mild glenohumeral arthrosis.  Moderate acromioclavicular arthrosis with prominent subacromial and subclavicular bone spurs and a type 3 acromion.  Thickened subacromial bursa.     Orthopedic PMH: none    Other PMH:  has a past medical history of Motion sickness; No pertinent past medical history; and PONV (postoperative nausea and vomiting). He also has no past medical history of Amblyopia; Arthritis; Diabetes (H); Diabetic retinopathy (H); Glaucoma; Hypertension; Macular degeneration; Nonsenile cataract; Retinal detachment; Strabismus; or Uveitis.  Patient Active Problem List    Diagnosis Date Noted     Left shoulder pain 12/10/2016     Priority: Medium     Effusion, left shoulder 12/10/2016     Priority: Medium     Acute postoperative pain of left shoulder 12/10/2016     Priority: Medium     Complete tear of left rotator cuff 10/10/2016     Priority: Medium     Tinea corporis 12/22/2014     SLAP tear of shoulder 10/27/2014     Impingement syndrome of right shoulder 04/29/2014     CARDIOVASCULAR SCREENING; LDL GOAL LESS THAN 160 03/02/2013     Situational anxiety 03/02/2013       Medications:   Current Outpatient Prescriptions:      Acetaminophen (TYLENOL PO), , Disp: , Rfl:     Allergies: No Known Allergies     This document serves as a record of the services and decisions personally performed and made by Guru Motta MD. It was created on his behalf by Heaven Barcenas, a trained medical scribe. The creation of this document is based the provider's statements to the medical scribe.    Scribe Heaven Barcenas 10:04 AM 2/20/2017     REVIEW OF SYSTEMS:  CONSTITUTIONAL:NEGATIVE for fever, chills, change in weight  INTEGUMENTARY/SKIN: NEGATIVE for worrisome rashes, moles or lesions  MUSCULOSKELETAL:See HPI above  NEURO: NEGATIVE for weakness, dizziness or paresthesias      PHYSICAL EXAM:  Resp 16  Ht 1.816 m (5' 11.5\")  Wt 99.5 kg (219 lb 6.4 oz)  BMI 30.17 kg/m2 "   GENERAL APPEARANCE: healthy, alert, no distress   SKIN: no suspicious lesions or rashes  NEURO: Normal strength and tone, mentation intact and speech normal  PSYCH:  mentation appears normal and affect normal  RESPIRATORY: No increased work of breathing.      left UPPER EXTREMITY:  Inspection: incisions healed.  Inspection: no swelling, no ecchymosis, no deformity  Palpation: nontender to palpation   Strength: forward flexion 4+/5. External rotation 4/5.  Range of motion: active: forward flexion 150 degrees, external rotation 50 degrees. Internal rotation: Hip pocket    Sensation intact to light touch in median, radial, ulnar and axillary nerve distributions  Palpable 2+ radial pulse, brisk capillary refill to all fingers, wwp  Intact epl fpl fdp edc wrist flexion/extension biceps triceps deltoid    X-RAY:  none reviewed today.    Impression: 56 year old male 11.5 weeks postoperative left shoulder rotator cuff repair - medium repair, doing well.    Plan:  * Rest  * Activity modification - avoid activities that aggravate symptoms; No grabbing pushing, pulling, reaching.  * light weight bearing left upper extremity.  * NSAIDS - regular use for inflammation, with food, as long as no contra-indications. Please discuss with pcp if needed.  * Ice twice daily to three times daily.  * Physical Therapy per medium repair/tenodesis protocol for aggressive motion. No restrictions on range of motion. Progress resistance.  * Tylenol as needed for pain  * Workability update: able to return to work on 2/27/2017 with light duties, 10 lbs lifting restriction below shoulder height. No lifting, away from body or overhead movements.   * Return to clinic in 6 weeks, sooner as needed.    The information in this document, created by a scribe for me, accurate reflects the services I personally performed and the decisions made by me. I have reviewed and approved this document for accuracy.      Guru Motta M.D., M.S.  Dept. of  Orthopaedic Surgery  [facility]

## 2017-02-20 NOTE — PATIENT INSTRUCTIONS
Please remember to call and schedule a follow up appointment if one was recommended at your earliest convenience.  Orthopedics CLINIC HOURS TELEPHONE NUMBER   Dr. Kalia Melchor  Certified Medical Assistant   Monday & Wednesday   8am - 5pm  Thursday 1pm - 5pm  Friday 8am -11:30am Specialty schedulers:   [phone] to schedule your surgery.  Main Clinic:   [phone] to make an appointment with any provider.    Urgent Care locations:    [facility] Monday-Friday Closed  Saturday-Sunday 9am-5pm      Monday-Friday 12pm - 8pm  Saturday-Sunday 9am-5pm [phone]     If SURGERY has been recommended, please call our Specialty Schedulers at [phone] to schedule your procedure.    If you need a medication refill, please contact your pharmacy. Please allow 3 business days for your refill to be completed.    If an MRI or CT scan has been recommended, please call [facility] Schedulers at [phone] to schedule your appointment.  Use [employer] (secure e-mail communication and access to your chart) to send a message or to make an appointment. Please ask how you can sign up for [employer].  Your care team's suggested websites for health information:   [email or web address] : Up to date and easily searchable information on multiple topics.   [email or web address] : [place] dept of heat, public health issues in [place], N1N1

## 2017-02-23 ENCOUNTER — TELEPHONE (OUTPATIENT)
Dept: ORTHOPEDICS | Facility: CLINIC | Age: 57
End: 2017-02-23

## 2017-02-23 ENCOUNTER — THERAPY VISIT (OUTPATIENT)
Dept: PHYSICAL THERAPY | Facility: CLINIC | Age: 57
End: 2017-02-23
Payer: OTHER MISCELLANEOUS

## 2017-02-23 DIAGNOSIS — M25.512 ACUTE PAIN OF LEFT SHOULDER: ICD-10-CM

## 2017-02-23 DIAGNOSIS — M25.512 ACUTE POSTOPERATIVE PAIN OF LEFT SHOULDER: ICD-10-CM

## 2017-02-23 DIAGNOSIS — G89.18 ACUTE POSTOPERATIVE PAIN OF LEFT SHOULDER: ICD-10-CM

## 2017-02-23 DIAGNOSIS — M25.412 EFFUSION, LEFT SHOULDER: ICD-10-CM

## 2017-02-23 PROCEDURE — 97110 THERAPEUTIC EXERCISES: CPT | Mod: GP | Performed by: PHYSICAL THERAPIST

## 2017-02-23 NOTE — TELEPHONE ENCOUNTER
Faxed a workability to Meghna at the number provided. Fax confirmed. Please leave message up as a reminder to fax the form once it has been abstracted.  Jill Baires Certified Medical Assistant

## 2017-02-23 NOTE — TELEPHONE ENCOUNTER
Called and talked to patient letting him know I would fax a workability. He actually would like a copy of the form that was filled out on Monday sent. I let him know that it was not scanned into his chart yet so I would have to wait until it is in order for me to fax that. He misplaced his copy. In the mean time I will fax over his restrictions. He thanked me for calling.  Jill Baires Certified Medical Assistant

## 2017-02-23 NOTE — TELEPHONE ENCOUNTER
Fax did not go through. Called patient back to let him know. The number was typed incorrect. Correct number: [phone]. Re-faxed. Fax confirmed.  Jill Baires Certified Medical Assistant

## 2017-02-23 NOTE — MR AVS SNAPSHOT
"              After Visit Summary   2/23/2017    Joshua Gill    MRN: [id number]           Patient Information     Date Of Birth          1960        Visit Information        Provider Department      2/23/2017 2:35 PM Avery Coffman PT [facility] [place] [facility]        Today's Diagnoses     Acute pain of left shoulder        Effusion, left shoulder        Acute postoperative pain of left shoulder           Follow-ups after your visit        Your next 10 appointments already scheduled     Apr 03, 2017  3:00 PM CDT   Return Visit with Guru Motta MD   [facility] [place] ([facility] [place])    [address]  [place] [id number] [phone]              Who to contact     If you have questions or need follow up information about today's clinic visit or your schedule please contact [facility] [place] [facility] directly at [phone].  Normal or non-critical lab and imaging results will be communicated to you by MyChart, letter or phone within 4 business days after the clinic has received the results. If you do not hear from us within 7 days, please contact the clinic through [employer] or phone. If you have a critical or abnormal lab result, we will notify you by phone as soon as possible.  Submit refill requests through [employer] or call your pharmacy and they will forward the refill request to us. Please allow 3 business days for your refill to be completed.          Additional Information About Your Visit        [employer] Information     [employer] lets you send messages to your doctor, view your test results, renew your prescriptions, schedule appointments and more. To sign up, go to [email or web address] . Click on \"Log in\" on the left side of the screen, which will take you to the Welcome page. Then click on \"Sign up Now\" on the right side of the page.     You will be asked to enter the access code listed below, as " well as some personal information. Please follow the directions to create your username and password.     Your access code is: [email or web address]  Expires: 3/1/2017 10:24 AM     Your access code will  in 90 days. If you need help or a new code, please call your [place] clinic or [phone].        Care EveryWhere ID     This is your Care EveryWhere ID. This could be used by other organizations to access your [place] medical records  [id number]         Blood Pressure from Last 3 Encounters:   16 117/78   16 127/73   16 122/68    Weight from Last 3 Encounters:   17 99.5 kg (219 lb 6.4 oz)   17 99.5 kg (219 lb 6.4 oz)   12/15/16 95.3 kg (210 lb)              We Performed the Following     Therapeutic Exercises        Primary Care Provider Office Phone # Fax #    Vinny Singh [phone]       [facility] [address] [address]        Thank you!     Thank you for choosing [facility]  for your care. Our goal is always to provide you with excellent care. Hearing back from our patients is one way we can continue to improve our services. Please take a few minutes to complete the written survey that you may receive in the mail after your visit with us. Thank you!             Your Updated Medication List - Protect others around you: Learn how to safely use, store and throw away your medicines at www.disposemymeds.org.          This list is accurate as of: 17  9:26 PM.  Always use your most recent med list.                   Brand Name Dispense Instructions for use    TYLENOL PO

## 2017-02-23 NOTE — TELEPHONE ENCOUNTER
Reason for Call:  Other call back    Detailed comments: Patient would like for the restriction letter to be fax to his employer (attention to Meghna Mika) fax number is     Phone Number Patient can be reached at: Home number on file [phone] (home)    Best Time: today    Can we leave a detailed message on this number? YES    Call taken on 2/23/2017 at 9:15 AM by Ruthann Mayer

## 2017-02-24 NOTE — PROGRESS NOTES
"Subjective:    HPI                    Objective:    System    Physical Exam    General     ROS    Assessment/Plan:      DISCHARGE REPORT    Progress reporting period is from 12/10/2016 to today.       SUBJECTIVE  Subjective: Pt reports doing well.  Will be returning to work next week on ten pound restriction and will be continuing PT on site.  HEP is getting easier, although still feels tight and weak.  Can get some soreness after HEP but doesn't last more than a few minutes.  Saw physician who pt reports was quite pleased with progress.  \"I'm happy where it's at now.\"    Current Pain level:  (\"sore\").     Initial Pain level: 3/10.   Changes in function:  Yes (See Goal flowsheet attached for changes in current functional level)  Adverse reaction to treatment or activity: None    OBJECTIVE    Left Shoulder AROM PROM MMT   Flexion 155 163 4-/5   Abduction 142 155 4-/5   External Rotation 64 DNT 3+/5   Internal Rotation L2 L1 4/5     ASSESSMENT/PLAN  Updated problem list and treatment plan: Diagnosis 1:  S/p L cuff repair -- see plan below  STG/LTGs have been met or progress has been made towards goals:  Yes (See Goal flow sheet completed today.)  Assessment of Progress: The patient's condition is improving.  Self Management Plans:  Patient has been instructed in a home treatment program.  I have re-evaluated this patient and find that the nature, scope, duration and intensity of the therapy is appropriate for the medical condition of the patient.  Joshua continues to require the following intervention to meet STG and LTG's:  PT    Recommendations:  Pt has overall progressed well to date with ongoing weakness in place.  Pt returning to work next week and has PT available on site.  Transition PT to that setting and pt agrees discharge from PT from this setting.    Please refer to the daily flowsheet for treatment today, total treatment time and time spent performing 1:1 timed codes.                "

## 2017-03-01 NOTE — TELEPHONE ENCOUNTER
Called and left a vm for the patient letting him know this form was faxed. I left our number.  Jill Baiers Certified Medical Assistant

## 2017-03-01 NOTE — TELEPHONE ENCOUNTER
Faxed workability form to [facility] at [phone]. Fax confirmed.  Jill Baires Certified Medical Assistant

## 2017-03-29 ENCOUNTER — TRANSFERRED RECORDS (OUTPATIENT)
Dept: HEALTH INFORMATION MANAGEMENT | Facility: CLINIC | Age: 57
End: 2017-03-29

## 2017-04-03 ENCOUNTER — OFFICE VISIT (OUTPATIENT)
Dept: ORTHOPEDICS | Facility: CLINIC | Age: 57
End: 2017-04-03
Payer: OTHER MISCELLANEOUS

## 2017-04-03 VITALS — WEIGHT: 220 LBS | RESPIRATION RATE: 16 BRPM | HEIGHT: 72 IN | BODY MASS INDEX: 29.8 KG/M2

## 2017-04-03 DIAGNOSIS — Z98.890 S/P ROTATOR CUFF REPAIR: Primary | ICD-10-CM

## 2017-04-03 PROCEDURE — 99213 OFFICE O/P EST LOW 20 MIN: CPT | Performed by: ORTHOPAEDIC SURGERY

## 2017-04-03 ASSESSMENT — PAIN SCALES - GENERAL: PAINLEVEL: NO PAIN (0)

## 2017-04-03 NOTE — PATIENT INSTRUCTIONS
Please remember to call and schedule a follow up appointment if one was recommended at your earliest convenience.  Orthopedics CLINIC HOURS TELEPHONE NUMBER   Dr. Kalia Melchor  Certified Medical Assistant   Monday & Wednesday   8am - 5pm  Thursday 1pm - 5pm  Friday 8am -11:30am Specialty schedulers:   [phone] to schedule your surgery.  Main Clinic:   [phone] to make an appointment with any provider.    Urgent Care locations:    [facility] Monday-Friday Closed  Saturday-Sunday 9am-5pm      Monday-Friday 12pm - 8pm  Saturday-Sunday 9am-5pm [phone]     If SURGERY has been recommended, please call our Specialty Schedulers at [phone] to schedule your procedure.    If you need a medication refill, please contact your pharmacy. Please allow 3 business days for your refill to be completed.    If an MRI or CT scan has been recommended, please call [facility] Schedulers at [phone] to schedule your appointment.  Use [employer] (secure e-mail communication and access to your chart) to send a message or to make an appointment. Please ask how you can sign up for [employer].  Your care team's suggested websites for health information:   Www.[email or web address] : Up to date and easily searchable information on multiple topics.   Www.health.[facility].[place].us : [place] dept of heat, public health issues in [place], N1N1

## 2017-04-03 NOTE — MR AVS SNAPSHOT
After Visit Summary   4/3/2017    Joshua Gill    MRN: [id number]           Patient Information     Date Of Birth          1960        Visit Information        Provider Department      4/3/2017 3:00 PM Guru Motta MD [facility] [place]        Care Instructions    Please remember to call and schedule a follow up appointment if one was recommended at your earliest convenience.  Orthopedics CLINIC HOURS TELEPHONE NUMBER   Dr. Kalia Melchor  Certified Medical Assistant   Monday & Wednesday   8am - 5pm  Thursday 1pm - 5pm  Friday 8am -11:30am Specialty schedulers:   [phone] to schedule your surgery.  Main Clinic:   [phone] to make an appointment with any provider.    Urgent Care locations:    [facility] Monday-Friday Closed  Saturday-Sunday 9am-5pm      Monday-Friday 12pm - 8pm  Saturday-Sunday 9am-5pm [phone]     If SURGERY has been recommended, please call our Specialty Schedulers at [phone] to schedule your procedure.    If you need a medication refill, please contact your pharmacy. Please allow 3 business days for your refill to be completed.    If an MRI or CT scan has been recommended, please call [facility] [facility] Schedulers at [phone] to schedule your appointment.  Use [employer] (secure e-mail communication and access to your chart) to send a message or to make an appointment. Please ask how you can sign up for [employer].  Your care team's suggested websites for health information:   Www.[email or web address] : Up to date and easily searchable information on multiple topics.   Www.health.[facility].[place].us : [place] dept of heat, public health issues in [place], N1N1            Follow-ups after your visit        Who to contact     If you have questions or need follow up information about today's clinic visit or your schedule please contact [facility] [place] directly at  "[phone].  Normal or non-critical lab and imaging results will be communicated to you by MyChart, letter or phone within 4 business days after the clinic has received the results. If you do not hear from us within 7 days, please contact the clinic through [employer]cliphart or phone. If you have a critical or abnormal lab result, we will notify you by phone as soon as possible.  Submit refill requests through [employer] or call your pharmacy and they will forward the refill request to us. Please allow 3 business days for your refill to be completed.          Additional Information About Your Visit        [employer]ital[employer] Information     [employer] lets you send messages to your doctor, view your test results, renew your prescriptions, schedule appointments and more. To sign up, go to [email or web address] . Click on \"Log in\" on the left side of the screen, which will take you to the Welcome page. Then click on \"Sign up Now\" on the right side of the page.     You will be asked to enter the access code listed below, as well as some personal information. Please follow the directions to create your username and password.     Your access code is: [email or web address]  Expires: 2017  3:06 PM     Your access code will  in 90 days. If you need help or a new code, please call your [place] clinic or [phone].        Care EveryWhere ID     This is your Care EveryWhere ID. This could be used by other organizations to access your [place] medical records  [id number]        Your Vitals Were     Respirations Height BMI (Body Mass Index)             16 1.816 m (5' 11.5\") 30.26 kg/m2          Blood Pressure from Last 3 Encounters:   16 117/78   16 127/73   16 122/68    Weight from Last 3 Encounters:   17 99.8 kg (220 lb)   17 99.5 kg (219 lb 6.4 oz)   17 99.5 kg (219 lb 6.4 oz)              Today, you had the following     No orders found for display       Primary Care Provider Office Phone # Fax #    " Vinny Singh [phone]       [facility] [address] [address]        Thank you!     Thank you for choosing [facility]  for your care. Our goal is always to provide you with excellent care. Hearing back from our patients is one way we can continue to improve our services. Please take a few minutes to complete the written survey that you may receive in the mail after your visit with us. Thank you!             Your Updated Medication List - Protect others around you: Learn how to safely use, store and throw away your medicines at www.disposemymeds.org.          This list is accurate as of: 4/3/17  3:06 PM.  Always use your most recent med list.                   Brand Name Dispense Instructions for use    TYLENOL PO

## 2017-04-03 NOTE — LETTER
[facility] [place]  [address]  [place] [id number] [phone]  WORKABILITY    [facility], Treasure Louie M.D. [place]        4/3/2017      RE: Joshua Gill    [address] [address]        To whom it may concern:     Joshua Gill is under my care for No diagnosis found..     Work related injury: Yes       Date of injury: 8/8/16      Date of surgery: 12/1/16    Return to work date: 4/4/17   ** WITH RESTRICTIONS? No restrictions        Next appointment: 2 months        Electronically Signed (as below)  Dr. Guru Motta M.D.

## 2017-04-03 NOTE — Clinical Note
4/3/2017       RE: Joshua Gill  [address] [address]           Dear Colleague,    Thank you for referring your patient, Joshua Gill, to the [facility]. Please see a copy of my visit note below.    CHIEF COMPLAINT:   Chief Complaint   Patient presents with     Surgical Followup     Work Comp. Left shoulder RCR-medium. 17.5 weeks post-op. DOI: 8/8/16. DOS: 12/1/16. Doing well. Going to PT 2x a week. He sleeps on his left shoulder and will be sore in the morning.     SURGICAL PROCEDURE: Left shoulder Rotator cuff repair - medium repair  1.  Left shoulder arthroscopy with rotator cuff repair, medium repair, double row technique.    2.  Left shoulder arthroscopic distal clavicle resection.    3.  Left shoulder arthroscopic proximal biceps tenodesis.    4.  Left shoulder arthroscopic subacromial decompression with partial acromioplasty.    5.  Left shoulder arthroscopic labral debridement.   DATE OF PROCEDURE: 12/1/2016      HISTORY OF PRESENT ILLNESS    Joshua Gill is a 56 year old male seen for postoperative follow-up of a left shoulder rotator cuff repair, medium repair that occurred 17.5 weeks ago. Original injury was 8/8/2016. Since surgery, patient states his shoulder is feeling better, rated a 0/10. He feels he has been doing great. Continues to go to physical therapy, twice weekly. States that physical therapy has been going well. He has returned to work. Patient reports waking up sore some days when he sleeps on his left side. Denies fevers, chills, night sweats. No wound problems. Compliant with weight bearing restrictions and elevation. There have been no issues since surgery. Denies numbness or tingling.    Present symptoms: mild pain.  Stiffness.  Pain severity: 0/10  Pain quality: aching  Frequency of symptoms: frequently  Exacerbating Factors: rotation and movement of shoulder  Relieving Factors: rest  Night Pain: Yes  Pain  while at rest: No   Associated numbness or tingling: No     OR FINDINGS:  Medium-sized tear of the supraspinatus with mild retraction.  Proximal biceps tendinosis with partial tearing, less than 25%, with medial subluxation.  Partial tearing of the superior fibers of the subscapularis with the remainder intact.  Type 1 SLAP tear.  Mild to moderate glenohumeral synovitis.  Mild glenohumeral arthrosis.  Moderate acromioclavicular arthrosis with prominent subacromial and subclavicular bone spurs and a type 3 acromion.  Thickened subacromial bursa.     Orthopedic PMH: none    Other PMH:  has a past medical history of Motion sickness; No pertinent past medical history; and PONV (postoperative nausea and vomiting). He also has no past medical history of Amblyopia; Arthritis; Diabetes (H); Diabetic retinopathy (H); Glaucoma; Hypertension; Macular degeneration; Nonsenile cataract; Retinal detachment; Strabismus; or Uveitis.  Patient Active Problem List    Diagnosis Date Noted     Complete tear of left rotator cuff 10/10/2016     Priority: Medium     Tinea corporis 12/22/2014     SLAP tear of shoulder 10/27/2014     Impingement syndrome of right shoulder 04/29/2014     CARDIOVASCULAR SCREENING; LDL GOAL LESS THAN 160 03/02/2013     Situational anxiety 03/02/2013       Medications:   Current Outpatient Prescriptions:      Acetaminophen (TYLENOL PO), , Disp: , Rfl:     Allergies: No Known Allergies     This document serves as a record of the services and decisions personally performed and made by Guru Motta MD. It was created on his behalf by Heaven Barcenas, a trained medical scribe. The creation of this document is based the provider's statements to the medical scribe.    Scribe Heaven Barcenas 4:41 PM 4/3/2017     REVIEW OF SYSTEMS:  CONSTITUTIONAL:NEGATIVE for fever, chills, change in weight  INTEGUMENTARY/SKIN: NEGATIVE for worrisome rashes, moles or lesions  MUSCULOSKELETAL:See HPI above  NEURO: NEGATIVE for weakness,  "dizziness or paresthesias      PHYSICAL EXAM:  Resp 16  Ht 1.816 m (5' 11.5\")  Wt 99.8 kg (220 lb)  BMI 30.26 kg/m2   GENERAL APPEARANCE: healthy, alert, no distress   SKIN: no suspicious lesions or rashes  NEURO: Normal strength and tone, mentation intact and speech normal  PSYCH:  mentation appears normal and affect normal  RESPIRATORY: No increased work of breathing.      left UPPER EXTREMITY:  Inspection: incisions healed.  Inspection: no swelling, no ecchymosis, no deformity  Palpation: nontender to palpation   Range of motion:    Active: forward flexion 165 degrees bilaterally, external rotation 60 degrees, Internal rotation: sacroiliac joint  Strength: Forward flexion: 4+/5, external rotation 5-/5  Sensation intact to light touch in median, radial, ulnar and axillary nerve distributions  Palpable 2+ radial pulse, brisk capillary refill to all fingers, wwp  Intact epl fpl fdp edc wrist flexion/extension biceps triceps deltoid    X-RAY:  none reviewed today.    Impression: 56 year old male 17.5 weeks postoperative left shoulder rotator cuff repair - medium repair, doing well.    Plan:  * Rest  * Activity modification - avoid activities that aggravate symptoms; No grabbing pushing, pulling, reaching.  * light weight bearing left upper extremity.  * NSAIDS - regular use for inflammation, with food, as long as no contra-indications. Please discuss with pcp if needed.  * Ice twice daily to three times daily.  * Physical Therapy per medium repair/tenodesis protocol for aggressive motion. No restrictions on range of motion. Progress resistance.  * Tylenol as needed for pain  * Workability update: able to return to work on 4/4/2017 with no restrictions.   * Return to clinic in 2 months, sooner as needed.    The information in this document, created by a scribe for me, accurately reflects the services I personally performed and the decisions made by me. I have reviewed and approved this document for accuracy.  "     Guru Motta M.D., M.S.  Dept. of Orthopaedic Surgery  [facility]      Again, thank you for allowing me to participate in the care of your patient.        Sincerely,              Guru Motta MD

## 2017-04-03 NOTE — PROGRESS NOTES
CHIEF COMPLAINT:   Chief Complaint   Patient presents with     Surgical Followup     Work Comp. Left shoulder RCR-medium. 17.5 weeks post-op. DOI: 8/8/16. DOS: 12/1/16. Doing well. Going to PT 2x a week. He sleeps on his left shoulder and will be sore in the morning.     SURGICAL PROCEDURE: Left shoulder Rotator cuff repair - medium repair  1.  Left shoulder arthroscopy with rotator cuff repair, medium repair, double row technique.    2.  Left shoulder arthroscopic distal clavicle resection.    3.  Left shoulder arthroscopic proximal biceps tenodesis.    4.  Left shoulder arthroscopic subacromial decompression with partial acromioplasty.    5.  Left shoulder arthroscopic labral debridement.   DATE OF PROCEDURE: 12/1/2016      HISTORY OF PRESENT ILLNESS    Joshua Gill Sr is a 56 year old male seen for postoperative follow-up of a left shoulder rotator cuff repair, medium repair that occurred 17.5 weeks ago. Original injury was 8/8/2016. Since surgery, patient states his shoulder is feeling better, rated a 0/10. He feels he has been doing great. Continues to go to physical therapy, twice weekly. States that physical therapy has been going well. He has returned to work. Patient reports waking up sore some days when he sleeps on his left side. Denies fevers, chills, night sweats. No wound problems. Compliant with weight bearing restrictions and elevation. There have been no issues since surgery. Denies numbness or tingling.    Present symptoms: no pain.  Mild stiffness.  Pain severity: 0/10  Pain quality: aching  Frequency of symptoms: frequently  Exacerbating Factors: rotation and movement of shoulder  Relieving Factors: rest  Night Pain: Yes, if sleeps on it.  Pain while at rest: No   Associated numbness or tingling: No     OR FINDINGS:  Medium-sized tear of the supraspinatus with mild retraction.  Proximal biceps tendinosis with partial tearing, less than 25%, with medial subluxation.  Partial tearing of the  "superior fibers of the subscapularis with the remainder intact.  Type 1 SLAP tear.  Mild to moderate glenohumeral synovitis.  Mild glenohumeral arthrosis.  Moderate acromioclavicular arthrosis with prominent subacromial and subclavicular bone spurs and a type 3 acromion.  Thickened subacromial bursa.     Orthopedic PMH: none    Other PMH:  has a past medical history of Motion sickness; No pertinent past medical history; and PONV (postoperative nausea and vomiting). He also has no past medical history of Amblyopia; Arthritis; Diabetes (H); Diabetic retinopathy (H); Glaucoma; Hypertension; Macular degeneration; Nonsenile cataract; Retinal detachment; Strabismus; or Uveitis.  Patient Active Problem List    Diagnosis Date Noted     Complete tear of left rotator cuff 10/10/2016     Priority: Medium     Tinea corporis 12/22/2014     SLAP tear of shoulder 10/27/2014     Impingement syndrome of right shoulder 04/29/2014     CARDIOVASCULAR SCREENING; LDL GOAL LESS THAN 160 03/02/2013     Situational anxiety 03/02/2013       Medications:   Current Outpatient Prescriptions:      Acetaminophen (TYLENOL PO), , Disp: , Rfl:     Allergies: No Known Allergies     This document serves as a record of the services and decisions personally performed and made by Guru Motta MD. It was created on his behalf by Heaven Barcenas, a trained medical scribe. The creation of this document is based the provider's statements to the medical scribe.    Scribe Heaven Barcenas 4:41 PM 4/3/2017     REVIEW OF SYSTEMS:  CONSTITUTIONAL:NEGATIVE for fever, chills, change in weight  INTEGUMENTARY/SKIN: NEGATIVE for worrisome rashes, moles or lesions  MUSCULOSKELETAL:See HPI above  NEURO: NEGATIVE for weakness, dizziness or paresthesias      PHYSICAL EXAM:  Resp 16  Ht 1.816 m (5' 11.5\")  Wt 99.8 kg (220 lb)  BMI 30.26 kg/m2   GENERAL APPEARANCE: healthy, alert, no distress   SKIN: no suspicious lesions or rashes  NEURO: Normal strength and tone, mentation " intact and speech normal  PSYCH:  mentation appears normal and affect normal  RESPIRATORY: No increased work of breathing.      left UPPER EXTREMITY:  Inspection: incisions healed.  Inspection: no swelling, no ecchymosis, no deformity  Palpation: nontender to palpation   Range of motion:    Active: forward flexion 165 degrees bilaterally, external rotation 60 degrees, Internal rotation: sacroiliac joint  Strength: Forward flexion: 4+/5, external rotation 5-/5  Sensation intact to light touch in median, radial, ulnar and axillary nerve distributions  Palpable 2+ radial pulse, brisk capillary refill to all fingers, wwp  Intact epl fpl fdp edc wrist flexion/extension biceps triceps deltoid    X-RAY:  none reviewed today.    Impression: 56 year old male 17.5 weeks postoperative left shoulder rotator cuff repair - medium repair, doing well.    Plan:  * Rest  * Activity modification - avoid activities that aggravate symptoms  * light weight bearing left upper extremity.  * NSAIDS - regular use for inflammation, with food, as long as no contra-indications.  * Ice as needed.  * Physical Therapy at work therapy.  * Tylenol as needed for pain  * Workability update: able to return to work on 4/4/2017 with no restrictions.   * Return to clinic in 2 months, sooner as needed.    The information in this document, created by a scribe for me, accurately reflects the services I personally performed and the decisions made by me. I have reviewed and approved this document for accuracy.      Guru Motta M.D., M.S.  Dept. of Orthopaedic Surgery  [facility]

## 2017-04-03 NOTE — LETTER
4/3/2017      RE: Joshua Gill  [id number] [facility] [id number]       CHIEF COMPLAINT:   Chief Complaint   Patient presents with     Surgical Followup     Work Comp. Left shoulder RCR-medium. 17.5 weeks post-op. DOI: 8/8/16. DOS: 12/1/16. Doing well. Going to PT 2x a week. He sleeps on his left shoulder and will be sore in the morning.     SURGICAL PROCEDURE: Left shoulder Rotator cuff repair - medium repair  1.  Left shoulder arthroscopy with rotator cuff repair, medium repair, double row technique.    2.  Left shoulder arthroscopic distal clavicle resection.    3.  Left shoulder arthroscopic proximal biceps tenodesis.    4.  Left shoulder arthroscopic subacromial decompression with partial acromioplasty.    5.  Left shoulder arthroscopic labral debridement.   DATE OF PROCEDURE: 12/1/2016      HISTORY OF PRESENT ILLNESS    Joshua Gill Sr is a 56 year old male seen for postoperative follow-up of a left shoulder rotator cuff repair, medium repair that occurred 17.5 weeks ago. Original injury was 8/8/2016. Since surgery, patient states his shoulder is feeling better, rated a 0/10. He feels he has been doing great. Continues to go to physical therapy, twice weekly. States that physical therapy has been going well. He has returned to work. Patient reports waking up sore some days when he sleeps on his left side. Denies fevers, chills, night sweats. No wound problems. Compliant with weight bearing restrictions and elevation. There have been no issues since surgery. Denies numbness or tingling.    Present symptoms: no pain.  Mild stiffness.  Pain severity: 0/10  Pain quality: aching  Frequency of symptoms: frequently  Exacerbating Factors: rotation and movement of shoulder  Relieving Factors: rest  Night Pain: Yes, if sleeps on it.  Pain while at rest: No   Associated numbness or tingling: No     OR FINDINGS:  Medium-sized tear of the supraspinatus with mild retraction.  Proximal biceps  "tendinosis with partial tearing, less than 25%, with medial subluxation.  Partial tearing of the superior fibers of the subscapularis with the remainder intact.  Type 1 SLAP tear.  Mild to moderate glenohumeral synovitis.  Mild glenohumeral arthrosis.  Moderate acromioclavicular arthrosis with prominent subacromial and subclavicular bone spurs and a type 3 acromion.  Thickened subacromial bursa.     Orthopedic PMH: none    Other PMH:  has a past medical history of Motion sickness; No pertinent past medical history; and PONV (postoperative nausea and vomiting). He also has no past medical history of Amblyopia; Arthritis; Diabetes (H); Diabetic retinopathy (H); Glaucoma; Hypertension; Macular degeneration; Nonsenile cataract; Retinal detachment; Strabismus; or Uveitis.  Patient Active Problem List    Diagnosis Date Noted     Complete tear of left rotator cuff 10/10/2016     Priority: Medium     Tinea corporis 12/22/2014     SLAP tear of shoulder 10/27/2014     Impingement syndrome of right shoulder 04/29/2014     CARDIOVASCULAR SCREENING; LDL GOAL LESS THAN 160 03/02/2013     Situational anxiety 03/02/2013       Medications:   Current Outpatient Prescriptions:      Acetaminophen (TYLENOL PO), , Disp: , Rfl:     Allergies: No Known Allergies     This document serves as a record of the services and decisions personally performed and made by Guru Motta MD. It was created on his behalf by Heaven Barcenas, a trained medical scribe. The creation of this document is based the provider's statements to the medical scribe.    Scribe Heaven Barcenas 4:41 PM 4/3/2017     REVIEW OF SYSTEMS:  CONSTITUTIONAL:NEGATIVE for fever, chills, change in weight  INTEGUMENTARY/SKIN: NEGATIVE for worrisome rashes, moles or lesions  MUSCULOSKELETAL:See HPI above  NEURO: NEGATIVE for weakness, dizziness or paresthesias      PHYSICAL EXAM:  Resp 16  Ht 1.816 m (5' 11.5\")  Wt 99.8 kg (220 lb)  BMI 30.26 kg/m2   GENERAL APPEARANCE: healthy, alert, no " distress   SKIN: no suspicious lesions or rashes  NEURO: Normal strength and tone, mentation intact and speech normal  PSYCH:  mentation appears normal and affect normal  RESPIRATORY: No increased work of breathing.      left UPPER EXTREMITY:  Inspection: incisions healed.  Inspection: no swelling, no ecchymosis, no deformity  Palpation: nontender to palpation   Range of motion:    Active: forward flexion 165 degrees bilaterally, external rotation 60 degrees, Internal rotation: sacroiliac joint  Strength: Forward flexion: 4+/5, external rotation 5-/5  Sensation intact to light touch in median, radial, ulnar and axillary nerve distributions  Palpable 2+ radial pulse, brisk capillary refill to all fingers, wwp  Intact epl fpl fdp edc wrist flexion/extension biceps triceps deltoid    X-RAY:  none reviewed today.    Impression: 56 year old male 17.5 weeks postoperative left shoulder rotator cuff repair - medium repair, doing well.    Plan:  * Rest  * Activity modification - avoid activities that aggravate symptoms  * light weight bearing left upper extremity.  * NSAIDS - regular use for inflammation, with food, as long as no contra-indications.  * Ice as needed.  * Physical Therapy at work therapy.  * Tylenol as needed for pain  * Workability update: able to return to work on 4/4/2017 with no restrictions.   * Return to clinic in 2 months, sooner as needed.    The information in this document, created by a scribe for me, accurately reflects the services I personally performed and the decisions made by me. I have reviewed and approved this document for accuracy.      Guru Motta M.D., M.S.  Dept. of Orthopaedic Surgery  [facility]            Guru Motta MD

## 2017-06-05 ENCOUNTER — OFFICE VISIT (OUTPATIENT)
Dept: ORTHOPEDICS | Facility: CLINIC | Age: 57
End: 2017-06-05
Payer: OTHER MISCELLANEOUS

## 2017-06-05 VITALS
RESPIRATION RATE: 16 BRPM | BODY MASS INDEX: 29.35 KG/M2 | DIASTOLIC BLOOD PRESSURE: 69 MMHG | HEIGHT: 72 IN | HEART RATE: 73 BPM | SYSTOLIC BLOOD PRESSURE: 116 MMHG | WEIGHT: 216.7 LBS

## 2017-06-05 DIAGNOSIS — Z98.890 S/P COMPLETE REPAIR OF ROTATOR CUFF: Primary | ICD-10-CM

## 2017-06-05 PROCEDURE — 99212 OFFICE O/P EST SF 10 MIN: CPT | Performed by: ORTHOPAEDIC SURGERY

## 2017-06-05 ASSESSMENT — PAIN SCALES - GENERAL: PAINLEVEL: NO PAIN (0)

## 2017-06-05 NOTE — MR AVS SNAPSHOT
After Visit Summary   6/5/2017    Joshua Gill    MRN: [id number]           Patient Information     Date Of Birth          1960        Visit Information        Provider Department      6/5/2017 3:00 PM Guru Motta MD [facility]        Care Instructions    Please remember to call and schedule a follow up appointment if one was recommended at your earliest convenience.  Orthopedics CLINIC HOURS TELEPHONE NUMBER   Dr. Kalia Melchor  Certified Medical Assistant   Monday & Wednesday   8am - 5pm  Thursday 1pm - 5pm  Friday 8am -11:30am Specialty schedulers:   [phone] to schedule your surgery.  Main Clinic:   [phone] to make an appointment with any provider.    Urgent Care locations:    [facility] Monday-Friday Closed  Saturday-Sunday 9am-5pm      Monday-Friday 12pm - 8pm  Saturday-Sunday 9am-5pm [phone]     If SURGERY has been recommended, please call our Specialty Schedulers at [phone] to schedule your procedure.    If you need a medication refill, please contact your pharmacy. Please allow 3 business days for your refill to be completed.    If an MRI or CT scan has been recommended, please call [facility] Schedulers at [phone] to schedule your appointment.  Use [employer] (secure e-mail communication and access to your chart) to send a message or to make an appointment. Please ask how you can sign up for [employer].  Your care team's suggested websites for health information:   [email or web address] : Up to date and easily searchable information on multiple topics.   [email or web address] : [place] dept of heat, public health issues in [place], N1N1              Follow-ups after your visit        Who to contact     If you have questions or need follow up information about today's clinic visit or your schedule please contact [facility] directly at  "[phone].  Normal or non-critical lab and imaging results will be communicated to you by MyChart, letter or phone within 4 business days after the clinic has received the results. If you do not hear from us within 7 days, please contact the clinic through [employer] or phone. If you have a critical or abnormal lab result, we will notify you by phone as soon as possible.  Submit refill requests through [employer] or call your pharmacy and they will forward the refill request to us. Please allow 3 business days for your refill to be completed.          Additional Information About Your Visit        [employer] Information     [employer] lets you send messages to your doctor, view your test results, renew your prescriptions, schedule appointments and more. To sign up, go to [email or web address] . Click on \"Log in\" on the left side of the screen, which will take you to the Welcome page. Then click on \"Sign up Now\" on the right side of the page.     You will be asked to enter the access code listed below, as well as some personal information. Please follow the directions to create your username and password.     Your access code is: [email or web address]  Expires: 2017  3:06 PM     Your access code will  in 90 days. If you need help or a new code, please call your [facility] clinic or [phone].        Care EveryWhere ID     This is your Care EveryWhere ID. This could be used by other organizations to access your [facility] medical records  [id number]        Your Vitals Were     Pulse Respirations Height BMI (Body Mass Index)          73 16 1.816 m (5' 11.5\") 29.8 kg/m2         Blood Pressure from Last 3 Encounters:   17 116/69   16 117/78   16 127/73    Weight from Last 3 Encounters:   17 98.3 kg (216 lb 11.2 oz)   17 99.8 kg (220 lb)   17 99.5 kg (219 lb 6.4 oz)              Today, you had the following     No orders found for display       Primary Care Provider Office Phone # Fax #    " Vinny Singh [phone]       [facility] [address] [facility] [address]        Thank you!     Thank you for choosing [facility]  for your care. Our goal is always to provide you with excellent care. Hearing back from our patients is one way we can continue to improve our services. Please take a few minutes to complete the written survey that you may receive in the mail after your visit with us. Thank you!             Your Updated Medication List - Protect others around you: Learn how to safely use, store and throw away your medicines at www.disposemymeds.org.          This list is accurate as of: 6/5/17  3:19 PM.  Always use your most recent med list.                   Brand Name Dispense Instructions for use    TYLENOL PO

## 2017-06-05 NOTE — PATIENT INSTRUCTIONS
Please remember to call and schedule a follow up appointment if one was recommended at your earliest convenience.  Orthopedics CLINIC HOURS TELEPHONE NUMBER   Dr. Kalia Melchor  Certified Medical Assistant   Monday & Wednesday   8am - 5pm  Thursday 1pm - 5pm  Friday 8am -11:30am Specialty schedulers:   [phone] to schedule your surgery.  Main Clinic:   [phone] to make an appointment with any provider.    Urgent Care locations:    [facility] Monday-Friday Closed  Saturday-Sunday 9am-5pm      Monday-Friday 12pm - 8pm  Saturday-Sunday 9am-5pm [phone]     If SURGERY has been recommended, please call our Specialty Schedulers at [phone] to schedule your procedure.    If you need a medication refill, please contact your pharmacy. Please allow 3 business days for your refill to be completed.    If an MRI or CT scan has been recommended, please call [facility] Schedulers at [phone] to schedule your appointment.  Use [employer] (secure e-mail communication and access to your chart) to send a message or to make an appointment. Please ask how you can sign up for [employer].  Your care team's suggested websites for health information:   Www.[email or web address] : Up to date and easily searchable information on multiple topics.   [email or web address] : [place]pt of [place], public health issues in [place], N1N1

## 2017-06-05 NOTE — NURSING NOTE
"Chief Complaint   Patient presents with     Surgical Followup     WORK COMP. Left shoulder scope RCR, SAD, DCR, LD, prox. BT. DOI 8/8/16. DOS 12/1/16, 6 month PO. Patient states his shoulder is doing good. He really hasn't been doing any exercises, does some. Denies any pain. When he gets up in the morning his hand is very slow but so is the other one, doesn't know if that is from surgery.        Initial /69  Pulse 73  Resp 16  Ht 1.816 m (5' 11.5\")  Wt 98.3 kg (216 lb 11.2 oz)  BMI 29.8 kg/m2 Estimated body mass index is 29.8 kg/(m^2) as calculated from the following:    Height as of this encounter: 1.816 m (5' 11.5\").    Weight as of this encounter: 98.3 kg (216 lb 11.2 oz).  Medication Reconciliation: complete   Jill Baires Certified Medical Assistant   "

## 2017-06-05 NOTE — PROGRESS NOTES
CHIEF COMPLAINT:   Chief Complaint   Patient presents with     Surgical Followup     WORK COMP. Left shoulder scope RCR, SAD, DCR, LD, prox. BT. DOI 8/8/16. DOS 12/1/16, 6 month PO. Patient states his shoulder is doing good. He really hasn't been doing any exercises, does some. Denies any pain. When he gets up in the morning his hand is very slow but so is the other one, doesn't know if that is from surgery.      SURGICAL PROCEDURE: Left shoulder Rotator cuff repair - medium repair  1.  Left shoulder arthroscopy with rotator cuff repair, medium repair, double row technique.    2.  Left shoulder arthroscopic distal clavicle resection.    3.  Left shoulder arthroscopic proximal biceps tenodesis.    4.  Left shoulder arthroscopic subacromial decompression with partial acromioplasty.    5.  Left shoulder arthroscopic labral debridement.   DATE OF PROCEDURE: 12/1/2016      HISTORY OF PRESENT ILLNESS    Joshua Gill Sr is a 57 year old male seen for postoperative follow-up of a left shoulder rotator cuff repair, medium repair that occurred 6 months ago. Original injury was 8/8/2016. Since surgery, patient states his shoulder is doing well, rated a 0/10. He feels he has been doing great. He hasn't been doing a lot of exercises lately, only intermittently. Denies any pain today. He feels his hands, bilaterally, are slow/stiff in the morning; is wondering if this because of surgery or not. Currently back to work, working 64 hours a week.    Present symptoms: no pain.  Mild stiffness in fingers of both hands.  Pain severity: 0/10  Pain quality: n/a  Frequency of symptoms: n/a  Exacerbating Factors: rotation and movement of shoulder  Relieving Factors: rest  Night Pain: no  Pain while at rest: No   Associated numbness or tingling: No     OR FINDINGS:  Medium-sized tear of the supraspinatus with mild retraction.  Proximal biceps tendinosis with partial tearing, less than 25%, with medial subluxation.  Partial tearing of the  "superior fibers of the subscapularis with the remainder intact.  Type 1 SLAP tear.  Mild to moderate glenohumeral synovitis.  Mild glenohumeral arthrosis.  Moderate acromioclavicular arthrosis with prominent subacromial and subclavicular bone spurs and a type 3 acromion.  Thickened subacromial bursa.     Orthopedic PMH: none    Other PMH:  has a past medical history of Motion sickness; No pertinent past medical history; and PONV (postoperative nausea and vomiting). He also has no past medical history of Amblyopia; Arthritis; Diabetes (H); Diabetic retinopathy (H); Glaucoma; Hypertension; Macular degeneration; Nonsenile cataract; Retinal detachment; Strabismus; or Uveitis.  Patient Active Problem List    Diagnosis Date Noted     Complete tear of left rotator cuff 10/10/2016     Priority: Medium     Tinea corporis 12/22/2014     SLAP tear of shoulder 10/27/2014     Impingement syndrome of right shoulder 04/29/2014     CARDIOVASCULAR SCREENING; LDL GOAL LESS THAN 160 03/02/2013     Situational anxiety 03/02/2013       Medications:   Current Outpatient Prescriptions:      Acetaminophen (TYLENOL PO), , Disp: , Rfl:     Allergies: No Known Allergies     This document serves as a record of the services and decisions personally performed and made by Guru Motta MD. It was created on his behalf by Heaven Barcenas, a trained medical scribe. The creation of this document is based the provider's statements to the medical scribe.    Scribhannah Barcenas 3:28 PM 6/5/2017     REVIEW OF SYSTEMS:  CONSTITUTIONAL:NEGATIVE for fever, chills, change in weight  INTEGUMENTARY/SKIN: NEGATIVE for worrisome rashes, moles or lesions  MUSCULOSKELETAL:See HPI above  NEURO: NEGATIVE for weakness, dizziness or paresthesias      PHYSICAL EXAM:  /69  Pulse 73  Resp 16  Ht 1.816 m (5' 11.5\")  Wt 98.3 kg (216 lb 11.2 oz)  BMI 29.8 kg/m2   GENERAL APPEARANCE: healthy, alert, no distress   SKIN: no suspicious lesions or rashes  NEURO: Normal " strength and tone, mentation intact and speech normal  PSYCH:  mentation appears normal and affect normal  RESPIRATORY: No increased work of breathing.      left UPPER EXTREMITY:  Inspection: incisions healed.  Inspection: no swelling, no ecchymosis, no deformity  Palpation: nontender to palpation   Range of motion:    Active: forward flexion 165 degrees bilaterally, external rotation 60 degrees, Internal rotation: L2  Strength: Forward flexion: 5-/5, external rotation 5-/5  Sensation intact to light touch in median, radial, ulnar and axillary nerve distributions  Palpable 2+ radial pulse, brisk capillary refill to all fingers, wwp  Intact epl fpl fdp edc wrist flexion/extension biceps triceps deltoid    X-RAY:  none reviewed today.    Impression: 57 year old male 6 months postoperative left shoulder rotator cuff repair - medium repair, doing well.    Plan:  * Rest  * Activity modification - avoid activities that aggravate symptoms  * weight bearing as tolerated.  * NSAIDS - regular use for inflammation, with food, as long as no contra-indications.  * Ice as needed.  * home exercise program.  * Tylenol as needed for pain  * Workability update: continue to work on 6/5/2017 with no restrictions.   * Return to clinic in 3 months, sooner as needed.    The information in this document, created by a scribe for me, accurately reflects the services I personally performed and the decisions made by me. I have reviewed and approved this document for accuracy.      Guru Motta M.D., M.S.  Dept. of Orthopaedic Surgery  [facility]

## 2017-06-05 NOTE — LETTER
[facility] [place]  [address] [address]  [place] [address] [phone]  WORKABILITY    [facility], Damari Louie M.D.lydestiny Rojas Natalya        6/5/2017      RE: Joshua Gill    [address] [address] [facility] [address]        To whom it may concern:     Joshua Gill is under my care for s/p left shoulder rotator cuff repair, distal clavicle resection, subacromial decompression, labral debridement, proximal biceps tenodesis.     Work related injury: Yes       Date of injury: 8/8/16      Date of surgery: 12/1/16    Return to work date: 6/5/17   ** WITH RESTRICTIONS? No restrictions - continue work without restrictions.        Next appointment: 3 months        Electronically Signed (as below)  Dr. Guru Motta M.D.

## 2017-06-05 NOTE — LETTER
6/5/2017      RE: Joshua Gill  [address] [address]       CHIEF COMPLAINT:   Chief Complaint   Patient presents with     Surgical Followup     WORK COMP. Left shoulder scope RCR, SAD, DCR, LD, prox. BT. DOI 8/8/16. DOS 12/1/16, 6 month PO. Patient states his shoulder is doing good. He really hasn't been doing any exercises, does some. Denies any pain. When he gets up in the morning his hand is very slow but so is the other one, doesn't know if that is from surgery.      SURGICAL PROCEDURE: Left shoulder Rotator cuff repair - medium repair  1.  Left shoulder arthroscopy with rotator cuff repair, medium repair, double row technique.    2.  Left shoulder arthroscopic distal clavicle resection.    3.  Left shoulder arthroscopic proximal biceps tenodesis.    4.  Left shoulder arthroscopic subacromial decompression with partial acromioplasty.    5.  Left shoulder arthroscopic labral debridement.   DATE OF PROCEDURE: 12/1/2016      HISTORY OF PRESENT ILLNESS    Joshua Gill Sr is a 57 year old male seen for postoperative follow-up of a left shoulder rotator cuff repair, medium repair that occurred 6 months ago. Original injury was 8/8/2016. Since surgery, patient states his shoulder is doing well, rated a 0/10. He feels he has been doing great. He hasn't been doing a lot of exercises lately, only intermittently. Denies any pain today. He feels his hands, bilaterally, are slow/stiff in the morning; is wondering if this because of surgery or not. Currently back to work, working 64 hours a week.    Present symptoms: no pain.  Mild stiffness in fingers of both hands.  Pain severity: 0/10  Pain quality: n/a  Frequency of symptoms: n/a  Exacerbating Factors: rotation and movement of shoulder  Relieving Factors: rest  Night Pain: no  Pain while at rest: No   Associated numbness or tingling: No     OR FINDINGS:  Medium-sized tear of the supraspinatus with mild retraction.  Proximal  "biceps tendinosis with partial tearing, less than 25%, with medial subluxation.  Partial tearing of the superior fibers of the subscapularis with the remainder intact.  Type 1 SLAP tear.  Mild to moderate glenohumeral synovitis.  Mild glenohumeral arthrosis.  Moderate acromioclavicular arthrosis with prominent subacromial and subclavicular bone spurs and a type 3 acromion.  Thickened subacromial bursa.     Orthopedic PMH: none    Other PMH:  has a past medical history of Motion sickness; No pertinent past medical history; and PONV (postoperative nausea and vomiting). He also has no past medical history of Amblyopia; Arthritis; Diabetes (H); Diabetic retinopathy (H); Glaucoma; Hypertension; Macular degeneration; Nonsenile cataract; Retinal detachment; Strabismus; or Uveitis.  Patient Active Problem List    Diagnosis Date Noted     Complete tear of left rotator cuff 10/10/2016     Priority: Medium     Tinea corporis 12/22/2014     SLAP tear of shoulder 10/27/2014     Impingement syndrome of right shoulder 04/29/2014     CARDIOVASCULAR SCREENING; LDL GOAL LESS THAN 160 03/02/2013     Situational anxiety 03/02/2013       Medications:   Current Outpatient Prescriptions:      Acetaminophen (TYLENOL PO), , Disp: , Rfl:     Allergies: No Known Allergies     This document serves as a record of the services and decisions personally performed and made by Guru Motta MD. It was created on his behalf by Heaven Barcenas, a trained medical scribe. The creation of this document is based the provider's statements to the medical scribe.    Scribe Heaven Barcenas 3:28 PM 6/5/2017     REVIEW OF SYSTEMS:  CONSTITUTIONAL:NEGATIVE for fever, chills, change in weight  INTEGUMENTARY/SKIN: NEGATIVE for worrisome rashes, moles or lesions  MUSCULOSKELETAL:See HPI above  NEURO: NEGATIVE for weakness, dizziness or paresthesias      PHYSICAL EXAM:  /69  Pulse 73  Resp 16  Ht 1.816 m (5' 11.5\")  Wt 98.3 kg (216 lb 11.2 oz)  BMI 29.8 kg/m2 "   GENERAL APPEARANCE: healthy, alert, no distress   SKIN: no suspicious lesions or rashes  NEURO: Normal strength and tone, mentation intact and speech normal  PSYCH:  mentation appears normal and affect normal  RESPIRATORY: No increased work of breathing.      left UPPER EXTREMITY:  Inspection: incisions healed.  Inspection: no swelling, no ecchymosis, no deformity  Palpation: nontender to palpation   Range of motion:    Active: forward flexion 165 degrees bilaterally, external rotation 60 degrees, Internal rotation: L2  Strength: Forward flexion: 5-/5, external rotation 5-/5  Sensation intact to light touch in median, radial, ulnar and axillary nerve distributions  Palpable 2+ radial pulse, brisk capillary refill to all fingers, wwp  Intact epl fpl fdp edc wrist flexion/extension biceps triceps deltoid    X-RAY:  none reviewed today.    Impression: 57 year old male 6 months postoperative left shoulder rotator cuff repair - medium repair, doing well.    Plan:  * Rest  * Activity modification - avoid activities that aggravate symptoms  * weight bearing as tolerated.  * NSAIDS - regular use for inflammation, with food, as long as no contra-indications.  * Ice as needed.  * home exercise program.  * Tylenol as needed for pain  * Workability update: continue to work on 6/5/2017 with no restrictions.   * Return to clinic in 3 months, sooner as needed.    The information in this document, created by a scribe for me, accurately reflects the services I personally performed and the decisions made by me. I have reviewed and approved this document for accuracy.      Guru Motta M.D., M.S.  Dept. of Orthopaedic Surgery  [facility]            Guru Motta MD

## 2017-06-05 NOTE — Clinical Note
6/5/2017       RE: Joshua Gill  [address] [address] [address]           Dear Colleague,    Thank you for referring your patient, Joshua Gill, to the [facility] [place]. Please see a copy of my visit note below.    CHIEF COMPLAINT:   Chief Complaint   Patient presents with     Surgical Followup     WORK COMP. Left shoulder scope RCR, SAD, DCR, LD, prox. BT. DOI 8/8/16. DOS 12/1/16, 6 month PO. Patient states his shoulder is doing good. He really hasn't been doing any exercises, does some. Denies any pain. When he gets up in the morning his hand is very slow but so is the other one, doesn't know if that is from surgery.      SURGICAL PROCEDURE: Left shoulder Rotator cuff repair - medium repair  1.  Left shoulder arthroscopy with rotator cuff repair, medium repair, double row technique.    2.  Left shoulder arthroscopic distal clavicle resection.    3.  Left shoulder arthroscopic proximal biceps tenodesis.    4.  Left shoulder arthroscopic subacromial decompression with partial acromioplasty.    5.  Left shoulder arthroscopic labral debridement.   DATE OF PROCEDURE: 12/1/2016      HISTORY OF PRESENT ILLNESS    Joshua Gill Sr is a 57 year old male seen for postoperative follow-up of a left shoulder rotator cuff repair, medium repair that occurred 6 months ago. Original injury was 8/8/2016. Since surgery, patient states his shoulder is doing well, rated a 0/10. He feels he has been doing great. He hasn't been doing a lot of exercises lately, only intermittently. Denies any pain today. He feels his hands, bilaterally, are slow in the morning; is wondering if this because of surgery or not. Currently back to work, working 64 hours a week.    Recall: Continues to go to physical therapy, twice weekly. States that physical therapy has been going well. He has returned to work. Patient reports waking up sore some days when he sleeps on his left side. Denies  fevers, chills, night sweats. No wound problems. Compliant with weight bearing restrictions and elevation. There have been no issues since surgery. Denies numbness or tingling.    Present symptoms: no pain.  Mild stiffness.  Pain severity: 0/10  Pain quality: aching  Frequency of symptoms: frequently  Exacerbating Factors: rotation and movement of shoulder  Relieving Factors: rest  Night Pain: Yes, if sleeps on it.  Pain while at rest: No   Associated numbness or tingling: No     OR FINDINGS:  Medium-sized tear of the supraspinatus with mild retraction.  Proximal biceps tendinosis with partial tearing, less than 25%, with medial subluxation.  Partial tearing of the superior fibers of the subscapularis with the remainder intact.  Type 1 SLAP tear.  Mild to moderate glenohumeral synovitis.  Mild glenohumeral arthrosis.  Moderate acromioclavicular arthrosis with prominent subacromial and subclavicular bone spurs and a type 3 acromion.  Thickened subacromial bursa.     Orthopedic PMH: none    Other PMH:  has a past medical history of Motion sickness; No pertinent past medical history; and PONV (postoperative nausea and vomiting). He also has no past medical history of Amblyopia; Arthritis; Diabetes (H); Diabetic retinopathy (H); Glaucoma; Hypertension; Macular degeneration; Nonsenile cataract; Retinal detachment; Strabismus; or Uveitis.  Patient Active Problem List    Diagnosis Date Noted     Complete tear of left rotator cuff 10/10/2016     Priority: Medium     Tinea corporis 12/22/2014     SLAP tear of shoulder 10/27/2014     Impingement syndrome of right shoulder 04/29/2014     CARDIOVASCULAR SCREENING; LDL GOAL LESS THAN 160 03/02/2013     Situational anxiety 03/02/2013       Medications:   Current Outpatient Prescriptions:      Acetaminophen (TYLENOL PO), , Disp: , Rfl:     Allergies: No Known Allergies     This document serves as a record of the services and decisions personally performed and made by Guru Motta  "MD. It was created on his behalf by Heaven Barcenas, a trained medical scribe. The creation of this document is based the provider's statements to the medical scribe.    Scribe Heaven Barcenas 3:28 PM 6/5/2017     REVIEW OF SYSTEMS:  CONSTITUTIONAL:NEGATIVE for fever, chills, change in weight  INTEGUMENTARY/SKIN: NEGATIVE for worrisome rashes, moles or lesions  MUSCULOSKELETAL:See HPI above  NEURO: NEGATIVE for weakness, dizziness or paresthesias      PHYSICAL EXAM:  /69  Pulse 73  Resp 16  Ht 1.816 m (5' 11.5\")  Wt 98.3 kg (216 lb 11.2 oz)  BMI 29.8 kg/m2   GENERAL APPEARANCE: healthy, alert, no distress   SKIN: no suspicious lesions or rashes  NEURO: Normal strength and tone, mentation intact and speech normal  PSYCH:  mentation appears normal and affect normal  RESPIRATORY: No increased work of breathing.      left UPPER EXTREMITY:  Inspection: incisions healed.  Inspection: no swelling, no ecchymosis, no deformity  Palpation: nontender to palpation   Range of motion:    Active: forward flexion 165 degrees bilaterally, external rotation 60 degrees, Internal rotation: sacroiliac joint  Strength: Forward flexion: 4+/5, external rotation 5-/5  Sensation intact to light touch in median, radial, ulnar and axillary nerve distributions  Palpable 2+ radial pulse, brisk capillary refill to all fingers, wwp  Intact epl fpl fdp edc wrist flexion/extension biceps triceps deltoid    X-RAY:  none reviewed today.    Impression: 57 year old male 6 months postoperative left shoulder rotator cuff repair - medium repair, doing well.    Plan:  * Rest  * Activity modification - avoid activities that aggravate symptoms  * light weight bearing left upper extremity.  * NSAIDS - regular use for inflammation, with food, as long as no contra-indications.  * Ice as needed.  * Physical Therapy at work therapy.  * Tylenol as needed for pain  * Workability update: continue to work on 6/5/2017 with no restrictions.   * Return to clinic in 3 " months, sooner as needed.    The information in this document, created by a scribe for me, accurately reflects the services I personally performed and the decisions made by me. I have reviewed and approved this document for accuracy.      Guru Motta M.D., M.S.  Dept. of Orthopaedic Surgery  [facility]      Again, thank you for allowing me to participate in the care of your patient.        Sincerely,              Guru Motta MD

## 2017-09-11 ENCOUNTER — OFFICE VISIT (OUTPATIENT)
Dept: ORTHOPEDICS | Facility: CLINIC | Age: 57
End: 2017-09-11
Payer: OTHER MISCELLANEOUS

## 2017-09-11 VITALS — RESPIRATION RATE: 16 BRPM | HEIGHT: 72 IN | BODY MASS INDEX: 28.61 KG/M2 | WEIGHT: 211.2 LBS

## 2017-09-11 DIAGNOSIS — Z98.890 STATUS POST COMPLETE REPAIR OF ROTATOR CUFF: Primary | ICD-10-CM

## 2017-09-11 PROCEDURE — 99212 OFFICE O/P EST SF 10 MIN: CPT | Performed by: ORTHOPAEDIC SURGERY

## 2017-09-11 ASSESSMENT — PAIN SCALES - GENERAL: PAINLEVEL: MILD PAIN (2)

## 2017-09-11 NOTE — NURSING NOTE
"Chief Complaint   Patient presents with     Surgical Followup     Work Comp. Left shoulder arthroscopy. RCR- medium. DOI: 8/8/16. DOS: 12/1/16.        Initial Resp 16  Ht 1.816 m (5' 11.5\")  Wt 95.8 kg (211 lb 3.2 oz)  BMI 29.05 kg/m2 Estimated body mass index is 29.05 kg/(m^2) as calculated from the following:    Height as of this encounter: 1.816 m (5' 11.5\").    Weight as of this encounter: 95.8 kg (211 lb 3.2 oz).  Medication Reconciliation: complete   Brian Salmeron PA-C, CAQ (Ortho)  Supervising Physician: Guru Motta M.D., M.S.  Dept. of Orthopaedic Surgery  [facility]      "

## 2017-09-11 NOTE — MR AVS SNAPSHOT
After Visit Summary   9/11/2017    Joshua Gill    MRN: [id number]           Patient Information     Date Of Birth          1960        Visit Information        Provider Department      9/11/2017 3:00 PM Guru Motta MD [facility] [place]        Care Instructions    Please remember to call and schedule a follow up appointment if one was recommended at your earliest convenience.  Orthopedics CLINIC HOURS TELEPHONE NUMBER   Dr. Kalia Melchor  Certified Medical Assistant   Monday & Wednesday   8am - 5pm  Thursday 1pm - 5pm  Friday 8am -11:30am Specialty schedulers:   [phone] to schedule your surgery.  Main Clinic:   [phone] to make an appointment with any provider.    Urgent Care locations:    [facility] Monday-Friday Closed  Saturday-Sunday 9am-5pm      Monday-Friday 12pm - 8pm  Saturday-Sunday 9am-5pm [phone]     If SURGERY has been recommended, please call our Specialty Schedulers at [phone] to schedule your procedure.    If you need a medication refill, please contact your pharmacy. Please allow 3 business days for your refill to be completed.    If an MRI or CT scan has been recommended, please call [place] [facility] Schedulers at [phone] to schedule your appointment.  Use [employer] (secure e-mail communication and access to your chart) to send a message or to make an appointment. Please ask how you can sign up for [employer].  Your care team's suggested websites for health information:   [email or web address] : Up to date and easily searchable information on multiple topics.   [email or web address] : [place] dept of heat, public health issues in [place], N1N1              Follow-ups after your visit        Your next 10 appointments already scheduled     Dec 11, 2017  3:00 PM CST   Return Visit with MD [facility] [place] ([facility]  [facility]) [address] [address]  [place] [address] [phone]              Who to contact     If you have questions or need follow up information about today's clinic visit or your schedule please contact [facility] [place] directly at [phone].  Normal or non-critical lab and imaging results will be communicated to you by MyChart, letter or phone within 4 business days after the clinic has received the results. If you do not hear from us within 7 days, please contact the clinic through MyChart or phone. If you have a critical or abnormal lab result, we will notify you by phone as soon as possible.  Submit refill requests through [employer] or call your pharmacy and they will forward the refill request to us. Please allow 3 business days for your refill to be completed.          Additional Information About Your Visit        MyChart Information     [employer] lets you send messages to your doctor, view your test results, renew your prescriptions, schedule appointments and more. To sign up, go to [email or web address] . Click on \"Log in\" on the left side of the screen, which will take you to the Welcome page. Then click on \"Sign up Now\" on the right side of the page.     You will be asked to enter the access code listed below, as well as some personal information. Please follow the directions to create your username and password.     Your access code is: [email or web address]  Expires: 12/10/2017  3:38 PM     Your access code will  in 90 days. If you need help or a new code, please call your [place] clinic or [phone].        Care EveryWhere ID     This is your Care EveryWhere ID. This could be used by other organizations to access your [place] medical records  [id number]        Your Vitals Were     Respirations Height BMI (Body Mass Index)             16 1.816 m (5' 11.5\") 29.05 kg/m2          Blood Pressure from Last 3 Encounters:   17 116/69   16 " 117/78   11/22/16 127/73    Weight from Last 3 Encounters:   09/11/17 95.8 kg (211 lb 3.2 oz)   06/05/17 98.3 kg (216 lb 11.2 oz)   04/03/17 99.8 kg (220 lb)              Today, you had the following     No orders found for display       Primary Care Provider Office Phone # Fax #    Vinny Singh [phone] [address] [address] [facility] [address]        Equal Access to Services     [facility] Center[facility]IREE : Hadii aad ku hadasho Soomaali, waaxda luqadaha, qaybta kaalmada adeegyada, waxay idiin hayaan shirley martinez . So [facility] [phone].    ATENCIÓN: Si habla español, tiene a hagan disposición servicios gratuitos de asistencia lingüística. Llame al [phone].    We comply with applicable federal civil rights laws and [place] laws. We do not discriminate on the basis of race, color, national origin, age, disability sex, sexual orientation or gender identity.            Thank you!     Thank you for choosing [facility]  for your care. Our goal is always to provide you with excellent care. Hearing back from our patients is one way we can continue to improve our services. Please take a few minutes to complete the written survey that you may receive in the mail after your visit with us. Thank you!             Your Updated Medication List - Protect others around you: Learn how to safely use, store and throw away your medicines at www.disposemymeds.org.          This list is accurate as of: 9/11/17  3:38 PM.  Always use your most recent med list.                   Brand Name Dispense Instructions for use Diagnosis    TYLENOL PO

## 2017-09-11 NOTE — PATIENT INSTRUCTIONS
Please remember to call and schedule a follow up appointment if one was recommended at your earliest convenience.  Orthopedics CLINIC HOURS TELEPHONE NUMBER   Dr. Kalia Melchor  Certified Medical Assistant   Monday & Wednesday   8am - 5pm  Thursday 1pm - 5pm  Friday 8am -11:30am Specialty schedulers:   [phone] to schedule your surgery.  Main Clinic:   [phone] to make an appointment with any provider.    Urgent Care locations:    [facility] Monday-Friday Closed  Saturday-Sunday 9am-5pm      Monday-Friday 12pm - 8pm  Saturday-Sunday 9am-5pm [phone]     If SURGERY has been recommended, please call our Specialty Schedulers at [phone] to schedule your procedure.    If you need a medication refill, please contact your pharmacy. Please allow 3 business days for your refill to be completed.    If an MRI or CT scan has been recommended, please call [facility] Schedulers at [phone] to schedule your appointment.  Use [employer] (secure e-mail communication and access to your chart) to send a message or to make an appointment. Please ask how you can sign up for [employer].  Your care team's suggested websites for health information:   [email or web address] : Up to date and easily searchable information on multiple topics.   [email or web address] : [place] dept of heat, public health issues in [place], N1N1

## 2017-09-11 NOTE — PROGRESS NOTES
CHIEF COMPLAINT:   Chief Complaint   Patient presents with     Surgical Followup     Work Comp. Left shoulder arthroscopy. RCR- medium. DOI: 8/8/16. DOS: 12/1/16. Doing well,      SURGICAL PROCEDURE: Left shoulder Rotator cuff repair - medium repair  1.  Left shoulder arthroscopy with rotator cuff repair, medium repair, double row technique.    2.  Left shoulder arthroscopic distal clavicle resection.    3.  Left shoulder arthroscopic proximal biceps tenodesis.    4.  Left shoulder arthroscopic subacromial decompression with partial acromioplasty.    5.  Left shoulder arthroscopic labral debridement.   DATE OF PROCEDURE: 12/1/2016      HISTORY OF PRESENT ILLNESS    Joshua Gill Sr is a 57 year old male seen for postoperative follow-up of a left shoulder rotator cuff repair, medium repair that occurred 9 months ago. Original injury was 8/8/2016. Since surgery, patient states his shoulder is doing well, rated a 0/10. He feels he has been doing great. Patient returns today for follow up. Denies any pain today. Currently back to work, working 64+ hours a week.    Present symptoms: no pain.  Pain severity: 0/10  Pain quality: n/a  Frequency of symptoms: n/a  Exacerbating Factors: rotation and movement of shoulder  Relieving Factors: rest  Night Pain: no  Pain while at rest: No   Associated numbness or tingling: No     OR FINDINGS:  Medium-sized tear of the supraspinatus with mild retraction.  Proximal biceps tendinosis with partial tearing, less than 25%, with medial subluxation.  Partial tearing of the superior fibers of the subscapularis with the remainder intact.  Type 1 SLAP tear.  Mild to moderate glenohumeral synovitis.  Mild glenohumeral arthrosis.  Moderate acromioclavicular arthrosis with prominent subacromial and subclavicular bone spurs and a type 3 acromion.  Thickened subacromial bursa.     Orthopedic PMH: none    Other PMH:  has a past medical history of Motion sickness; No pertinent past medical  "history; and PONV (postoperative nausea and vomiting). He also has no past medical history of Amblyopia; Arthritis; Diabetes (H); Diabetic retinopathy (H); Glaucoma; Hypertension; Macular degeneration; Nonsenile cataract; Retinal detachment; Strabismus; or Uveitis.  Patient Active Problem List    Diagnosis Date Noted     Complete tear of left rotator cuff 10/10/2016     Priority: Medium     Tinea corporis 12/22/2014     Priority: Medium     SLAP tear of shoulder 10/27/2014     Priority: Medium     Impingement syndrome of right shoulder 04/29/2014     Priority: Medium     CARDIOVASCULAR SCREENING; LDL GOAL LESS THAN 160 03/02/2013     Priority: Medium     Situational anxiety 03/02/2013     Priority: Medium       Medications:   Current Outpatient Prescriptions:      Acetaminophen (TYLENOL PO), , Disp: , Rfl:     Allergies: No Known Allergies     REVIEW OF SYSTEMS:  CONSTITUTIONAL:NEGATIVE for fever, chills, change in weight  INTEGUMENTARY/SKIN: NEGATIVE for worrisome rashes, moles or lesions  MUSCULOSKELETAL:See HPI above  NEURO: NEGATIVE for weakness, dizziness or paresthesias    This document serves as a record of the services and decisions personally performed and made by Guru Motta MD. It was created on his behalf by Heaven Barcenas, a trained medical scribe. The creation of this document is based the provider's statements to the medical scribe.    Scribe Heaven Barcenas 3:35 PM 9/11/2017       PHYSICAL EXAM:  Resp 16  Ht 1.816 m (5' 11.5\")  Wt 95.8 kg (211 lb 3.2 oz)  BMI 29.05 kg/m2   GENERAL APPEARANCE: healthy, alert, no distress   SKIN: no suspicious lesions or rashes  NEURO: Normal strength and tone, mentation intact and speech normal  PSYCH:  mentation appears normal and affect normal  RESPIRATORY: No increased work of breathing.      left UPPER EXTREMITY:  Inspection: incisions healed.  Inspection: no swelling, no ecchymosis, no deformity  Palpation: nontender to palpation   Range of motion:    Active: " forward flexion 165 degrees bilaterally, external rotation 60 degrees, Internal rotation: L2  Strength: Forward flexion: 5/5, external rotation 5/5  Sensation intact to light touch in median, radial, ulnar and axillary nerve distributions  Palpable 2+ radial pulse, brisk capillary refill to all fingers, wwp  Intact epl fpl fdp edc wrist flexion/extension biceps triceps deltoid    X-RAY:  none reviewed today.    Impression: 57 year old male 9 months postoperative left shoulder rotator cuff repair - medium repair, doing well.    Plan:  * Rest  * Activity modification - avoid activities that aggravate symptoms  * weight bearing as tolerated.  * NSAIDS - regular use for inflammation, with food, as long as no contra-indications.  * Ice as needed.  * home exercise program.  * Tylenol as needed for pain  * Workability update: denies need for note today.  * Return to clinic in 3 months for 1 year follow up. Likely last visit.    The information in this document, created by a scribe for me, accurately reflects the services I personally performed and the decisions made by me. I have reviewed and approved this document for accuracy.      Guru Motta M.D., M.S.  Dept. of Orthopaedic Surgery  [facility]

## 2017-12-11 ENCOUNTER — OFFICE VISIT (OUTPATIENT)
Dept: ORTHOPEDICS | Facility: CLINIC | Age: 57
End: 2017-12-11
Payer: OTHER MISCELLANEOUS

## 2017-12-11 VITALS — HEIGHT: 72 IN | RESPIRATION RATE: 16 BRPM | WEIGHT: 210.2 LBS | BODY MASS INDEX: 28.47 KG/M2

## 2017-12-11 DIAGNOSIS — Z98.890 S/P LEFT ROTATOR CUFF REPAIR: Primary | ICD-10-CM

## 2017-12-11 PROCEDURE — 99213 OFFICE O/P EST LOW 20 MIN: CPT | Performed by: ORTHOPAEDIC SURGERY

## 2017-12-11 ASSESSMENT — PAIN SCALES - GENERAL: PAINLEVEL: NO PAIN (0)

## 2017-12-11 NOTE — LETTER
12/11/2017         RE: Joshua Gill  [address] [address] [address] [facility] [address]        Dear Colleague,    Thank you for referring your patient, Joshua Gill, to the [facility]. Please see a copy of my visit note below.    CHIEF COMPLAINT:   Chief Complaint   Patient presents with     Surgical Followup     WORK COMP. Left shoulder scope - RCR (M); DCR, SAD, LD, proximal B Tenodesis. DOI: 8/8/16. DOS 12/1/16. Patient states his shoulder is doing good, may not be as good as the right but still good. Still has some pain when sleeping on it. If he does some rubber band exercises it feels better.      SURGICAL PROCEDURE: Left shoulder Rotator cuff repair - medium repair  1.  Left shoulder arthroscopy with rotator cuff repair, medium repair, double row technique.    2.  Left shoulder arthroscopic distal clavicle resection.    3.  Left shoulder arthroscopic proximal biceps tenodesis.    4.  Left shoulder arthroscopic subacromial decompression with partial acromioplasty.    5.  Left shoulder arthroscopic labral debridement.   DATE OF PROCEDURE: 12/1/2016      HISTORY OF PRESENT ILLNESS    Joshua Gill Sr is a 57 year old male seen for postoperative follow-up of a left shoulder rotator cuff repair, medium repair that occurred 1 year ago. Original injury was 8/8/2016. He returns today doing great. He has no pain today, rated a 0/10. It is still not as good as his right shoulder, however. He has some pain with sleeping on it. If he does have some pain, he will do some rubber band exercises, which helps great.    Present symptoms: no pain.  Pain severity: 0/10  Pain quality: n/a  Frequency of symptoms: n/a  Exacerbating Factors: rotation and movement of shoulder  Relieving Factors: rest  Night Pain: no  Pain while at rest: No   Associated numbness or tingling: No     OR FINDINGS:  Medium-sized tear of the supraspinatus with mild retraction.  Proximal biceps  tendinosis with partial tearing, less than 25%, with medial subluxation.  Partial tearing of the superior fibers of the subscapularis with the remainder intact.  Type 1 SLAP tear.  Mild to moderate glenohumeral synovitis.  Mild glenohumeral arthrosis.  Moderate acromioclavicular arthrosis with prominent subacromial and subclavicular bone spurs and a type 3 acromion.  Thickened subacromial bursa.     Orthopedic PMH: none    Other PMH:  has a past medical history of Motion sickness; No pertinent past medical history; and PONV (postoperative nausea and vomiting). He also has no past medical history of Amblyopia; Arthritis; Diabetes (H); Diabetic retinopathy (H); Glaucoma; Hypertension; Macular degeneration; Nonsenile cataract; Retinal detachment; Strabismus; or Uveitis.  Patient Active Problem List    Diagnosis Date Noted     Complete tear of left rotator cuff 10/10/2016     Priority: Medium     Tinea corporis 12/22/2014     Priority: Medium     SLAP tear of shoulder 10/27/2014     Priority: Medium     Impingement syndrome of right shoulder 04/29/2014     Priority: Medium     CARDIOVASCULAR SCREENING; LDL GOAL LESS THAN 160 03/02/2013     Priority: Medium     Situational anxiety 03/02/2013     Priority: Medium       Medications:   Current Outpatient Prescriptions:      Acetaminophen (TYLENOL PO), , Disp: , Rfl:     Allergies: No Known Allergies     REVIEW OF SYSTEMS:  CONSTITUTIONAL:NEGATIVE for fever, chills, change in weight  INTEGUMENTARY/SKIN: NEGATIVE for worrisome rashes, moles or lesions  MUSCULOSKELETAL:See HPI above  NEURO: NEGATIVE for weakness, dizziness or paresthesias    This document serves as a record of the services and decisions personally performed and made by Guru Motta MD. It was created on his behalf by Heaven Barcenas, a trained medical scribe. The creation of this document is based the provider's statements to the medical scribe.    Danielle Barcenas 2:57 PM 12/11/2017     PHYSICAL EXAM:  Resp 16  "  1.816 m (5' 11.5\")  Wt 95.3 kg (210 lb 3.2 oz)  BMI 28.91 kg/m2   GENERAL APPEARANCE: healthy, alert, no distress   SKIN: no suspicious lesions or rashes  NEURO: Normal strength and tone, mentation intact and speech normal  PSYCH:  mentation appears normal and affect normal  RESPIRATORY: No increased work of breathing.      left UPPER EXTREMITY:  Inspection: incisions healed.  Inspection: no swelling, no ecchymosis, no deformity  Palpation: nontender to palpation   Range of motion:    Active: forward flexion 165 degrees (bilaterally), external rotation 60 degrees, Internal rotation: L2 ( bilateral)  Strength: Forward flexion: 5/5, external rotation 5/5  Sensation intact to light touch in median, radial, ulnar and axillary nerve distributions  Palpable 2+ radial pulse, brisk capillary refill to all fingers, wwp  Intact epl fpl fdp edc wrist flexion/extension biceps triceps deltoid    X-RAY:  none reviewed today.    Impression: 57 year old male 1 year postoperative left shoulder rotator cuff repair - medium repair, doing well.    Plan:  * glad to hear he is doing well  * at maximum medical improvement.  * will address paperwork to close case once we receive it.  * Return to clinic as needed.    The information in this document, created by a scribe for me, accurately reflects the services I personally performed and the decisions made by me. I have reviewed and approved this document for accuracy.        Guru Motta M.D., M.S.  Dept. of Orthopaedic Surgery  [facility]      Again, thank you for allowing me to participate in the care of your patient.        Sincerely,        Guru Motta MD    "

## 2017-12-11 NOTE — NURSING NOTE
"Chief Complaint   Patient presents with     Surgical Followup     WORK COMP. Left shoulder scope - RCR (M); DCR, SAD, LD, proximal B Tenodesis. DOI: 8/8/16. DOS 12/1/16. Patient states his shoulder is doing good, may not be as good as the right but still good. Still has some pain when sleeping on it. If he does some rubber band exercises it feels better.        Initial Resp 16  Ht 1.816 m (5' 11.5\")  Wt 95.3 kg (210 lb 3.2 oz)  BMI 28.91 kg/m2 Estimated body mass index is 28.91 kg/(m^2) as calculated from the following:    Height as of this encounter: 1.816 m (5' 11.5\").    Weight as of this encounter: 95.3 kg (210 lb 3.2 oz).  Medication Reconciliation: complete   Jill Baires Certified Medical Assistant    "

## 2017-12-11 NOTE — PROGRESS NOTES
CHIEF COMPLAINT:   Chief Complaint   Patient presents with     Surgical Followup     WORK COMP. Left shoulder scope - RCR (M); DCR, SAD, LD, proximal B Tenodesis. DOI: 8/8/16. DOS 12/1/16. Patient states his shoulder is doing good, may not be as good as the right but still good. Still has some pain when sleeping on it. If he does some rubber band exercises it feels better.      SURGICAL PROCEDURE: Left shoulder Rotator cuff repair - medium repair  1.  Left shoulder arthroscopy with rotator cuff repair, medium repair, double row technique.    2.  Left shoulder arthroscopic distal clavicle resection.    3.  Left shoulder arthroscopic proximal biceps tenodesis.    4.  Left shoulder arthroscopic subacromial decompression with partial acromioplasty.    5.  Left shoulder arthroscopic labral debridement.   DATE OF PROCEDURE: 12/1/2016      HISTORY OF PRESENT ILLNESS    Joshua Gill Sr is a 57 year old male seen for postoperative follow-up of a left shoulder rotator cuff repair, medium repair that occurred 1 year ago. Original injury was 8/8/2016. He returns today doing great. He has no pain today, rated a 0/10. It is still not as good as his right shoulder, however. He has some pain with sleeping on it. If he does have some pain, he will do some rubber band exercises, which helps great.    Present symptoms: no pain.  Pain severity: 0/10  Pain quality: n/a  Frequency of symptoms: n/a  Exacerbating Factors: rotation and movement of shoulder  Relieving Factors: rest  Night Pain: no  Pain while at rest: No   Associated numbness or tingling: No     OR FINDINGS:  Medium-sized tear of the supraspinatus with mild retraction.  Proximal biceps tendinosis with partial tearing, less than 25%, with medial subluxation.  Partial tearing of the superior fibers of the subscapularis with the remainder intact.  Type 1 SLAP tear.  Mild to moderate glenohumeral synovitis.  Mild glenohumeral arthrosis.  Moderate acromioclavicular arthrosis  "with prominent subacromial and subclavicular bone spurs and a type 3 acromion.  Thickened subacromial bursa.     Orthopedic PMH: none    Other PMH:  has a past medical history of Motion sickness; No pertinent past medical history; and PONV (postoperative nausea and vomiting). He also has no past medical history of Amblyopia; Arthritis; Diabetes (H); Diabetic retinopathy (H); Glaucoma; Hypertension; Macular degeneration; Nonsenile cataract; Retinal detachment; Strabismus; or Uveitis.  Patient Active Problem List    Diagnosis Date Noted     Complete tear of left rotator cuff 10/10/2016     Priority: Medium     Tinea corporis 12/22/2014     Priority: Medium     SLAP tear of shoulder 10/27/2014     Priority: Medium     Impingement syndrome of right shoulder 04/29/2014     Priority: Medium     CARDIOVASCULAR SCREENING; LDL GOAL LESS THAN 160 03/02/2013     Priority: Medium     Situational anxiety 03/02/2013     Priority: Medium       Medications:   Current Outpatient Prescriptions:      Acetaminophen (TYLENOL PO), , Disp: , Rfl:     Allergies: No Known Allergies     REVIEW OF SYSTEMS:  CONSTITUTIONAL:NEGATIVE for fever, chills, change in weight  INTEGUMENTARY/SKIN: NEGATIVE for worrisome rashes, moles or lesions  MUSCULOSKELETAL:See HPI above  NEURO: NEGATIVE for weakness, dizziness or paresthesias    This document serves as a record of the services and decisions personally performed and made by Guur Motta MD. It was created on his behalf by Heaven Barcenas, a trained medical scribe. The creation of this document is based the provider's statements to the medical scribe.    Scribe Heaven Barcenas 2:57 PM 12/11/2017     PHYSICAL EXAM:  Resp 16  Ht 1.816 m (5' 11.5\")  Wt 95.3 kg (210 lb 3.2 oz)  BMI 28.91 kg/m2   GENERAL APPEARANCE: healthy, alert, no distress   SKIN: no suspicious lesions or rashes  NEURO: Normal strength and tone, mentation intact and speech normal  PSYCH:  mentation appears normal and affect " normal  RESPIRATORY: No increased work of breathing.      left UPPER EXTREMITY:  Inspection: incisions healed.  Inspection: no swelling, no ecchymosis, no deformity  Palpation: nontender to palpation   Range of motion:    Active: forward flexion 165 degrees (bilaterally), external rotation 60 degrees, Internal rotation: L2 ( bilateral)  Strength: Forward flexion: 5/5, external rotation 5/5  Sensation intact to light touch in median, radial, ulnar and axillary nerve distributions  Palpable 2+ radial pulse, brisk capillary refill to all fingers, wwp  Intact epl fpl fdp edc wrist flexion/extension biceps triceps deltoid    X-RAY:  none reviewed today.    Impression: 57 year old male 1 year postoperative left shoulder rotator cuff repair - medium repair, doing well.    Plan:  * glad to hear he is doing well  * at maximum medical improvement.  * will address paperwork to close case once we receive it.  * Return to clinic as needed.    The information in this document, created by a scribe for me, accurately reflects the services I personally performed and the decisions made by me. I have reviewed and approved this document for accuracy.        Guru Motta M.D., M.S.  Dept. of Orthopaedic Surgery  [facility]

## 2017-12-11 NOTE — MR AVS SNAPSHOT
After Visit Summary   12/11/2017    Joshua Gill    MRN: [id number]           Patient Information     Date Of Birth          1960        Visit Information        Provider Department      12/11/2017 3:00 PM Guru Motta MD [facility]        Today's Diagnoses     S/P left rotator cuff repair    -  1      Care Instructions    Please remember to call and schedule a follow up appointment if one was recommended at your earliest convenience.  Orthopedics CLINIC HOURS TELEPHONE NUMBER   Dr. Kalia Melchor  Certified Medical Assistant   Monday & Wednesday   8am - 5pm  Thursday 1pm - 5pm  Friday 8am -11:30am Specialty schedulers:   [phone] to schedule your surgery.  Main Clinic:   [phone] to make an appointment with any provider.    Urgent Care locations:    [facility] Monday-Friday [facility]  Saturday-Sunday 9am-5pm      Monday-Friday 12pm - 8pm  Saturday-Sunday 9am-5pm [phone]     If SURGERY has been recommended, please call our Specialty Schedulers at [phone] to schedule your procedure.    If you need a medication refill, please contact your pharmacy. Please allow 3 business days for your refill to be completed.    If an MRI or CT scan has been recommended, please call [facility] Schedulers at [phone] to schedule your appointment.  Use [employer] (secure e-mail communication and access to your chart) to send a message or to make an appointment. Please ask how you can sign up for [employer].  Your care team's suggested websites for health information:   [email or web address] : Up to date and easily searchable information on multiple topics.   [email or web address] : [place] dept of heat[place], public health issues in [place], N1N1              Follow-ups after your visit        Follow-up notes from your care team     Return if symptoms worsen or fail to improve.      Who to contact     If you  "have questions or need follow up information about today's clinic visit or your schedule please contact [facility] directly at [phone].  Normal or non-critical lab and imaging results will be communicated to you by MyChart, letter or phone within 4 business days after the clinic has received the results. If you do not hear from us within 7 days, please contact the clinic through [employer] or phone. If you have a critical or abnormal lab result, we will notify you by phone as soon as possible.  Submit refill requests through [employer] or call your pharmacy and they will forward the refill request to us. Please allow 3 business days for your refill to be completed.          Additional Information About Your Visit        [employer] Information     [employer] lets you send messages to your doctor, view your test results, renew your prescriptions, schedule appointments and more. To sign up, go to [email or web address] . Click on \"Log in\" on the left side of the screen, which will take you to the Welcome page. Then click on \"Sign up Now\" on the right side of the page.     You will be asked to enter the access code listed below, as well as some personal information. Please follow the directions to create your username and password.     Your access code is: [email or web address]  Expires: 3/11/2018  3:05 PM     Your access code will  in 90 days. If you need help or a new code, please call your [place] clinic or [phone].        Care EveryWhere ID     This is your Care EveryWhere ID. This could be used by other organizations to access your [place] medical records  [id number]        Your Vitals Were     Respirations Height BMI (Body Mass Index)             16 5' 11.5\" (1.816 m) 28.91 kg/m2          Blood Pressure from Last 3 Encounters:   17 116/69   16 117/78   16 127/73    Weight from Last 3 Encounters:   17 210 lb 3.2 oz (95.3 kg)   17 211 lb 3.2 oz (95.8 kg) "   06/05/17 216 lb 11.2 oz (98.3 kg)              Today, you had the following     No orders found for display       Primary Care Provider Office Phone # Fax #    Vinny Singh [phone] [address] [facility] [address]        Equal Access to Services     [facility]: Hadii aad ku hadasho Soomaali, waaxda luqadaha, qaybta kaalmada adeegyada, waxay idiin hayaan adeeg kharash la'aan . So [facility] [phone].    ATENCIÓN: Si habla español, tiene a hagan disposición servicios gratuitos de asistencia lingüística. Llame al [phone].    We comply with applicable federal civil rights laws and [place] laws. We do not discriminate on the basis of race, color, national origin, age, disability, sex, sexual orientation, or gender identity.            Thank you!     Thank you for choosing [facility]  for your care. Our goal is always to provide you with excellent care. Hearing back from our patients is one way we can continue to improve our services. Please take a few minutes to complete the written survey that you may receive in the mail after your visit with us. Thank you!             Your Updated Medication List - Protect others around you: Learn how to safely use, store and throw away your medicines at www.disposemymeds.org.          This list is accurate as of: 12/11/17  3:05 PM.  Always use your most recent med list.                   Brand Name Dispense Instructions for use Diagnosis    TYLENOL PO

## 2019-01-24 ENCOUNTER — TELEPHONE (OUTPATIENT)
Dept: FAMILY MEDICINE | Facility: CLINIC | Age: 59
End: 2019-01-24

## 2019-01-24 DIAGNOSIS — T75.3XXA MOTION SICKNESS, INITIAL ENCOUNTER: Primary | ICD-10-CM

## 2019-01-24 NOTE — TELEPHONE ENCOUNTER
They are going on 7 day cruise and would like a prescription for motion sickness patch.  Nichole Valente BSN, RN

## 2019-01-25 RX ORDER — SCOLOPAMINE TRANSDERMAL SYSTEM 1 MG/1
1 PATCH, EXTENDED RELEASE TRANSDERMAL
Qty: 1 PATCH | Refills: 0 | Status: SHIPPED | OUTPATIENT
Start: 2019-01-25 | End: 2019-03-26

## 2019-01-25 NOTE — TELEPHONE ENCOUNTER
Pt notified of provider message as written.  Pt verbalized good understanding.  Nichole Valente BSN, RN

## 2019-03-26 ENCOUNTER — OFFICE VISIT (OUTPATIENT)
Dept: FAMILY MEDICINE | Facility: CLINIC | Age: 59
End: 2019-03-26
Payer: COMMERCIAL

## 2019-03-26 VITALS
WEIGHT: 220 LBS | DIASTOLIC BLOOD PRESSURE: 80 MMHG | BODY MASS INDEX: 30.26 KG/M2 | TEMPERATURE: 97.4 F | HEART RATE: 83 BPM | SYSTOLIC BLOOD PRESSURE: 133 MMHG | OXYGEN SATURATION: 96 % | RESPIRATION RATE: 16 BRPM

## 2019-03-26 DIAGNOSIS — H93.8X3 PLUGGED FEELING IN EAR, BILATERAL: ICD-10-CM

## 2019-03-26 DIAGNOSIS — H69.93 DYSFUNCTION OF BOTH EUSTACHIAN TUBES: Primary | ICD-10-CM

## 2019-03-26 DIAGNOSIS — H91.93 DECREASED HEARING OF BOTH EARS: ICD-10-CM

## 2019-03-26 PROCEDURE — 99214 OFFICE O/P EST MOD 30 MIN: CPT | Mod: 25 | Performed by: PHYSICIAN ASSISTANT

## 2019-03-26 PROCEDURE — 92551 PURE TONE HEARING TEST AIR: CPT | Performed by: PHYSICIAN ASSISTANT

## 2019-03-26 PROCEDURE — 92567 TYMPANOMETRY: CPT | Performed by: PHYSICIAN ASSISTANT

## 2019-03-26 RX ORDER — CETIRIZINE HYDROCHLORIDE 10 MG/1
10 TABLET ORAL DAILY
Qty: 30 TABLET | Refills: 0 | Status: SHIPPED | OUTPATIENT
Start: 2019-03-26 | End: 2019-04-25

## 2019-03-26 RX ORDER — FLUTICASONE PROPIONATE 50 MCG
1 SPRAY, SUSPENSION (ML) NASAL DAILY
Qty: 9.9 ML | Refills: 0 | Status: SHIPPED | OUTPATIENT
Start: 2019-03-26 | End: 2021-11-03

## 2019-03-26 ASSESSMENT — PAIN SCALES - GENERAL: PAINLEVEL: NO PAIN (0)

## 2019-03-26 NOTE — PROGRESS NOTES
SUBJECTIVE:   Joshua Gill is a 58 year old male who presents to clinic today for the following health issues:      Patient presents with:  Ear Problem: both ears feel plugged, x 2 weeks.  Did have a head cold .  Also took airplane ride around this time.  He does state that he has a hard time hearing out of both ears.  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  No ear pain.  He did use a over-the-counter ear irrigation kit.  He thinks he has wax buildup in both ears.    Health Maintenance: Preventive care/labs/coloc cancer screen/Imm      HEARING FREQUENCY    Right Ear:      1000 Hz RESPONSE- on Level: 60 db (Conditioning sound)   1000 Hz: RESPONSE- on Level: 60 db   2000 Hz: RESPONSE- on Level: 30 db   4000 Hz: RESPONSE- on Level: 65 db    Left Ear:      4000 Hz: RESPONSE- on Level: 35 db   2000 Hz: RESPONSE- on Level: 20 db   1000 Hz: RESPONSE- on Level:   20 db     500 Hz: RESPONSE- on Level: 35 db    Right Ear:    500 Hz: RESPONSE- on Level: 35 db    Hearing Acuity: REFER    Hearing Assessment: abnormal--middle ear fluid present: rescreen in clinic in 8-10 weeks        No Known Allergies    Past Medical History:   Diagnosis Date     Motion sickness      No pertinent past medical history      PONV (postoperative nausea and vomiting)          Current Outpatient Medications on File Prior to Visit:  Acetaminophen (TYLENOL PO)      No current facility-administered medications on file prior to visit.     Social History     Tobacco Use     Smoking status: Former Smoker     Packs/day: 0.00     Types: Cigarettes     Last attempt to quit: 2016     Years since quittin.5     Smokeless tobacco: Never Used   Substance Use Topics     Alcohol use: Yes     Comment: occ.       ROS:  CONSTITUTIONAL: Negative for fatigue or fever.  EYES: Negative for eye problems.  ENT: As above.  RESP: As above.  CV: Negative for chest pains.  GI:  Negative for vomiting.  MUSCULOSKELETAL:  Negative for significant muscle or joint pains.  NEUROLOGIC: Negative for headaches.  SKIN: Negative for rash.    OBJECTIVE:  /80   Pulse 83   Temp 97.4  F (36.3  C) (Oral)   Resp 16   Wt 99.8 kg (220 lb)   SpO2 96%   BMI 30.26 kg/m    GENERAL APPEARANCE: Healthy, alert and no distress.  EYES:Conjunctiva/sclera clear.  EARS: No cerumen.   Ear canals w/o erythema.  TM's intact w/o erythema.  The TMs do appear a little dull as if there is some fluid behind them.  NOSE/MOUTH: Nose without ulcers, erythema or lesions.  SINUSES: No maxillary sinus tenderness.  THROAT: No erythema w/o tonsillar enlargement . No exudates.  NECK: Supple, nontender, no lymphadenopathy.  RESP: Lungs clear to auscultation - no rales, rhonchi or wheezes  CV: Regular rate and rhythm, normal S1 S2, no murmur noted.  NEURO: Awake, alert    SKIN: No rashes    Hearing test above does show  hearing loss.  Tympanogram shows no flat line  ASSESSMENT:     ICD-10-CM    1. Dysfunction of both eustachian tubes H69.83 fluticasone (FLONASE) 50 MCG/ACT nasal spray     OTOLARYNGOLOGY REFERRAL     cetirizine (ZYRTEC) 10 MG tablet   2. Plugged feeling in ear, bilateral H93.8X3 SCREENING TEST, PURE TONE, AIR ONLY     fluticasone (FLONASE) 50 MCG/ACT nasal spray     OTOLARYNGOLOGY REFERRAL     cetirizine (ZYRTEC) 10 MG tablet   3. Decreased hearing of both ears H91.93 fluticasone (FLONASE) 50 MCG/ACT nasal spray     OTOLARYNGOLOGY REFERRAL     cetirizine (ZYRTEC) 10 MG tablet     TYMPANOMETRY         PLAN: Hearing loss likely present before the past 2 weeks due to his occupation. Try Flonase and Zyrtec.  Follow-up with ENT in 2 weeks.   Anisha Bloom PA-C

## 2019-08-05 ENCOUNTER — TELEPHONE (OUTPATIENT)
Dept: FAMILY MEDICINE | Facility: CLINIC | Age: 59
End: 2019-08-05

## 2020-11-13 ENCOUNTER — OFFICE VISIT (OUTPATIENT)
Dept: FAMILY MEDICINE | Facility: CLINIC | Age: 60
End: 2020-11-13
Payer: COMMERCIAL

## 2020-11-13 VITALS
HEIGHT: 72 IN | HEART RATE: 74 BPM | TEMPERATURE: 97.6 F | BODY MASS INDEX: 29.66 KG/M2 | DIASTOLIC BLOOD PRESSURE: 80 MMHG | SYSTOLIC BLOOD PRESSURE: 142 MMHG | OXYGEN SATURATION: 96 % | WEIGHT: 219 LBS

## 2020-11-13 DIAGNOSIS — M79.605 PAIN AND SWELLING OF LEFT LOWER EXTREMITY: Primary | ICD-10-CM

## 2020-11-13 DIAGNOSIS — R03.0 ELEVATED BLOOD PRESSURE READING WITHOUT DIAGNOSIS OF HYPERTENSION: ICD-10-CM

## 2020-11-13 DIAGNOSIS — M79.89 PAIN AND SWELLING OF LEFT LOWER EXTREMITY: Primary | ICD-10-CM

## 2020-11-13 PROCEDURE — 99214 OFFICE O/P EST MOD 30 MIN: CPT | Performed by: NURSE PRACTITIONER

## 2020-11-13 ASSESSMENT — MIFFLIN-ST. JEOR: SCORE: 1838.44

## 2020-11-13 NOTE — PROGRESS NOTES
"Subjective     Joshuabud Gill is a 60 year old male who presents to clinic today for the following health issues:    HPI           Patient reports pain in his left leg for the lasts two weeks.  There is also some mild swelling.  He denies any particular trauma or injuries.  Pain is worse at night or when getting out of bed in the morning.  Once he gets up and is moving for the day, pain is improved.  He has been working 15-16 hours days, 7 days per week for the last several months.  On his feet all day.      Patient states his brother had both of his legs amputated at age 59 and he wants to make sure he doesn't have anything wrong with his legs.  He isn't exactly sure what was wrong with his brother but states brother had to get legs \"roto-rooted out\" and continued to smoke cigarettes.     Patient denies any obvious redness or warmth in his left leg. No numbness, tinging, temperature/color change or wounds.  There is some swelling at the sock line.  He is a former smoker, quit 2 years ago.  30+ pack year hx.  He denies any hx of CAD, PAD, DM, HLD or HTN, although patient states he \"never goes to the doctor\" and hasn't had labs done in many years.      He is not interested in scheduling a physical or getting any lab work done today.       Review of Systems   Constitutional, HEENT, cardiovascular, pulmonary, GI, , musculoskeletal, neuro, skin, endocrine and psych systems are negative, except as otherwise noted.      Objective    BP (!) 142/80   Pulse 74   Temp 97.6  F (36.4  C) (Tympanic)   Ht 1.816 m (5' 11.5\")   Wt 99.3 kg (219 lb)   SpO2 96%   BMI 30.12 kg/m    Body mass index is 30.12 kg/m .  Physical Exam   GENERAL: healthy, alert and no distress  EYES: Eyes grossly normal to inspection, PERRL and conjunctivae and sclerae normal  HENT: ear canals and TM's normal, nose and mouth without ulcers or lesions  NECK: no adenopathy, no asymmetry, masses, or scars and thyroid normal to palpation  RESP: lungs " clear to auscultation - no rales, rhonchi or wheezes  CV: regular rate and rhythm, normal S1 S2, no S3 or S4, no murmur, click or rub, no peripheral edema and peripheral pulses strong  MS: Strong pedal pulses bilaterally.  Feet are warm with brisk cap refill.  No lower extremity wounds. Sensation intact.  He does have significant varicosities bilaterally.  Moderate non-pitting edema to LLE at sock line. Gait is normal.   SKIN: no suspicious lesions or rashes  NEURO: Normal strength and tone, mentation intact and speech normal  PSYCH: mentation appears normal, affect normal/bright            Assessment & Plan     Pain and swelling of left lower extremity  Will check venous US to rule out clot, due to pain and asymmetric LE edema. If normal, I think most likely related to very long hours on his feet all day and he has signs of venous insufficiency.  We discussed trial of compression stocking if US normal. I think unlikely PAD- patient has strong pulses, feet are warm, brisk cap refill.  No claudication symptoms- feels better when he is walking. He does have risk factors for PAD- former smoker, possible HTN/HLD, family hx.     - US Lower Extremity Venous Duplex Left; Future    Elevated blood pressure reading without diagnosis of hypertension  I encourage him to make an appointment for a physical with labs in near future- he declined.  At a minimum I asked him to return for nurse blood pressure check visit..        See Patient Instructions  Patient Instructions   We will get an ultrasound of you left leg to rule out a blood clot.  If negative for clot- swelling and discomfort could be from being on your feet for extended hours and the varicose veins in your legs.   For that we could have you try wearing compression stockings- helps with pain and swelling.     You are over due for a physical.  Please come in for a physical with labs and get your blood pressure rechecked in the next two weeks.   At a minimum, you should  come back for a blood pressure recheck in 2 weeks with a nurse- you can make an appointment for this.         Return in about 2 weeks (around 11/27/2020) for Physical Exam, Lab Work, BP Recheck.    Juliana Cosme [facility]

## 2020-11-13 NOTE — PATIENT INSTRUCTIONS
We will get an ultrasound of you left leg to rule out a blood clot.  If negative for clot- swelling and discomfort could be from being on your feet for extended hours and the varicose veins in your legs.   For that we could have you try wearing compression stockings- helps with pain and swelling.     You are over due for a physical.  Please come in for a physical with labs and get your blood pressure rechecked in the next two weeks.   At a minimum, you should come back for a blood pressure recheck in 2 weeks with a nurse- you can make an appointment for this.

## 2020-11-19 ENCOUNTER — TELEPHONE (OUTPATIENT)
Dept: FAMILY MEDICINE | Facility: CLINIC | Age: 60
End: 2020-11-19

## 2020-11-19 ENCOUNTER — ANCILLARY PROCEDURE (OUTPATIENT)
Dept: ULTRASOUND IMAGING | Facility: CLINIC | Age: 60
End: 2020-11-19
Attending: NURSE PRACTITIONER
Payer: COMMERCIAL

## 2020-11-19 DIAGNOSIS — R03.0 ELEVATED BLOOD PRESSURE READING WITHOUT DIAGNOSIS OF HYPERTENSION: ICD-10-CM

## 2020-11-19 DIAGNOSIS — Z13.220 SCREENING FOR LIPID DISORDERS: ICD-10-CM

## 2020-11-19 DIAGNOSIS — I82.432 ACUTE DEEP VEIN THROMBOSIS (DVT) OF POPLITEAL VEIN OF LEFT LOWER EXTREMITY (H): Primary | ICD-10-CM

## 2020-11-19 DIAGNOSIS — M79.89 PAIN AND SWELLING OF LEFT LOWER EXTREMITY: ICD-10-CM

## 2020-11-19 DIAGNOSIS — M79.605 PAIN AND SWELLING OF LEFT LOWER EXTREMITY: ICD-10-CM

## 2020-11-19 NOTE — TELEPHONE ENCOUNTER
Call to patient with US results. Acute DVT of LLE.     IMPRESSION:  1.  Nonocclusive left popliteal vein DVT with extension into one of  the left peroneal veins of the proximal calf.      Will have him start Eliquis 10 mg BID x 7 days, 5 mg BID thereafter.  Minimum treatment of 3 months, may need longer as this was unprovoked based on patient history.  Referral placed to hematology for further work-up.  He continues to have no cardiopulmonary complaints.     He has not had labs in a long time and I am uncertain of his renal function.  No past hx of CKD.  I have asked him to return in the next week for lab work to ensure normal renal function, but will not delay him in starting Eliquis.  Will also check a non-fasting lipid panel at that time since this also has not been done in many years, possibly never before.  I have asked him to get his blood pressure checked with a nurse at the same time, it was mildly elevated at our visit (142/80).  If still elevated, will plan on starting an antihypertensive.     Patient reported understanding and has no questions.  He was advised to go to the ER with any concerning new symptoms such as chest pain or shortness of breath.     Juliana Cosme, CNP

## 2020-11-19 NOTE — TELEPHONE ENCOUNTER
"This RN took Dr Alvarez's call.   He was calling to report that patient just had the ultrasound for DVT that was a \"keep and call\".  Since they weren't given a phone number to call, they sent the patient home.   This RN will huddle with Dr. Govind Quiles who is the only provider in the clinic right now.       Rosalie Cardenas BSN, RN       "

## 2020-12-02 NOTE — PROGRESS NOTES
Per Chela 11/19 Tele Enc  I have asked him to get his blood pressure checked with a nurse at the same time, it was mildly elevated at our visit (142/80).  If still elevated, will plan on starting an antihypertensive

## 2020-12-03 ENCOUNTER — ALLIED HEALTH/NURSE VISIT (OUTPATIENT)
Dept: NURSING | Facility: CLINIC | Age: 60
End: 2020-12-03
Payer: COMMERCIAL

## 2020-12-03 VITALS — HEART RATE: 69 BPM | SYSTOLIC BLOOD PRESSURE: 130 MMHG | DIASTOLIC BLOOD PRESSURE: 88 MMHG | OXYGEN SATURATION: 97 %

## 2020-12-03 DIAGNOSIS — R03.0 ELEVATED BLOOD PRESSURE READING WITHOUT DIAGNOSIS OF HYPERTENSION: ICD-10-CM

## 2020-12-03 DIAGNOSIS — I82.432 ACUTE DEEP VEIN THROMBOSIS (DVT) OF POPLITEAL VEIN OF LEFT LOWER EXTREMITY (H): ICD-10-CM

## 2020-12-03 DIAGNOSIS — Z13.220 SCREENING FOR LIPID DISORDERS: ICD-10-CM

## 2020-12-03 DIAGNOSIS — R03.0 ELEVATED BLOOD PRESSURE READING WITHOUT DIAGNOSIS OF HYPERTENSION: Primary | ICD-10-CM

## 2020-12-03 LAB
ANION GAP SERPL CALCULATED.3IONS-SCNC: 5 MMOL/L (ref 3–14)
BUN SERPL-MCNC: 26 MG/DL (ref 7–30)
CALCIUM SERPL-MCNC: 8.4 MG/DL (ref 8.5–10.1)
CHLORIDE SERPL-SCNC: 108 MMOL/L (ref 94–109)
CHOLEST SERPL-MCNC: 155 MG/DL
CO2 SERPL-SCNC: 28 MMOL/L (ref 20–32)
CREAT SERPL-MCNC: 0.86 MG/DL (ref 0.66–1.25)
GFR SERPL CREATININE-BSD FRML MDRD: >90 ML/MIN/{1.73_M2}
GLUCOSE SERPL-MCNC: 79 MG/DL (ref 70–99)
HDLC SERPL-MCNC: 43 MG/DL
LDLC SERPL CALC-MCNC: 95 MG/DL
NONHDLC SERPL-MCNC: 112 MG/DL
POTASSIUM SERPL-SCNC: 3.8 MMOL/L (ref 3.4–5.3)
SODIUM SERPL-SCNC: 141 MMOL/L (ref 133–144)
TRIGL SERPL-MCNC: 86 MG/DL

## 2020-12-03 PROCEDURE — 99207 PR NO CHARGE NURSE ONLY: CPT

## 2020-12-03 PROCEDURE — 80061 LIPID PANEL: CPT | Performed by: NURSE PRACTITIONER

## 2020-12-03 PROCEDURE — 80048 BASIC METABOLIC PNL TOTAL CA: CPT | Performed by: NURSE PRACTITIONER

## 2020-12-03 NOTE — Clinical Note
FYI to provider.   Patient seen in clinic today for BP recheck after elevated bp noted at office visit.

## 2020-12-03 NOTE — NURSING NOTE
SUBJECTIVE:  Joshua Gill Sr. is an 60 year old male who presents for a blood pressure check.    The reason for the visit is:  an elevated blood pressure was noted elsewhere and they were told to come for a recheck    The patient reports that he IS NOT taking the medication  For hypertension .     Current Outpatient Medications   Medication     Acetaminophen (TYLENOL PO)     apixaban ANTICOAGULANT (ELIQUIS) 5 MG tablet     [START ON 12/19/2020] apixaban ANTICOAGULANT (ELIQUIS) 5 MG tablet     fluticasone (FLONASE) 50 MCG/ACT nasal spray     No current facility-administered medications for this visit.        No Known Allergies      OBJECTIVE:  Please get a blood pressure AND a pulse.  A height is also needed if has not been done in the past year.    /88   Pulse 69   SpO2 97%         If patient has diagnosis of HYPERTENSION, is there a goal on the problem list? No  Patient Active Problem List   Diagnosis     CARDIOVASCULAR SCREENING; LDL GOAL LESS THAN 160     Situational anxiety     Impingement syndrome of right shoulder     SLAP tear of shoulder     Tinea corporis     Complete tear of left rotator cuff       Plan:    Systolic BP    Greater than or equal to 100  OR Less than  140    Diastolic BP    Greater than or equal to 70 OR less than 90    Pulse    Pulse greater than 60 or less than 100      If all the above three parameters are met, patient to go home. Chart CC to Primary Care Provider  If any one of the above three parameters is not met notify RN or provider.    Ricardo Albert CMA

## 2021-01-15 ENCOUNTER — HEALTH MAINTENANCE LETTER (OUTPATIENT)
Age: 61
End: 2021-01-15

## 2021-01-21 ENCOUNTER — VIRTUAL VISIT (OUTPATIENT)
Dept: HEMATOLOGY | Facility: CLINIC | Age: 61
End: 2021-01-21
Attending: NURSE PRACTITIONER
Payer: COMMERCIAL

## 2021-01-21 DIAGNOSIS — I82.432 ACUTE DEEP VEIN THROMBOSIS (DVT) OF POPLITEAL VEIN OF LEFT LOWER EXTREMITY (H): ICD-10-CM

## 2021-01-21 PROCEDURE — 99204 OFFICE O/P NEW MOD 45 MIN: CPT | Mod: 95 | Performed by: INTERNAL MEDICINE

## 2021-01-21 NOTE — PROGRESS NOTES
Patient was contacted to complete the pre-visit call prior to their video visit with the provider.  The following statement was read:       This visit will be billed to your insurance the same as an in-person visit. Because of Coronavirus we are instituting video visits when possible to keep everyone safe. This video visit will be conducted between you and the provider.  This service lets us provide the care you need with a video conversation.  If a prescription is necessary, we can send it directly to your pharmacy.If lab work or other testing is needed, we can help arrange a place/time for that to be done at a later date.If during the course of the call the provider feels a video visit is not appropriate, then your insurance company will not be billed.       Allergies and medications were reviewed and travel screening complete.     I thanked them for their time to cover this information     Farzad Ibrahim CMA    Due to the ongoing COVID-19 outbreak, this visit was conducted by video, with the patient's approval.        [facility]  [address] [place] [id number]  Phone: [phone], Fax: [phone]    Outpatient Visit Note:    Patient: Joshua Gill Sr.  MRN: [id number]  : 1960  ROSENDO: 2021     Reason for Consultation:  Joshua Gill Sr. is a referred by Juliana Cosme NP for evaluation and treatment of DVT.    Assessment: Joshua Gill Sr. is a 60 year old man with an unprovoked distal LLE DVT. I recommended treatment for 3 months.  Given the distal thrombosis, which is at low risk for recurrence, I recommended against indefinite secondary prophylaxis today but did recommend use of intermittent prophylaxis with surgery, hospitalization or travel > 4 hours.    Recommendations:  1. I counseled the patient about my assessment of his distal unprovoked DVT including treatment, prognosis and risk of recurrnce  2. 2 more months of apixaban and then  stop  3. Intermittent prophylaxis when risk for recurrence is high: surgery or hospital stay (30 days) and days of travel > 4 hours.  4. Repeat baseline ultrasound after the completion of therapy.    40 minutes spent on the date of the encounter doing chart review, review of test results, interpretation of tests, patient visit and documentation     Kwesi Gilman MD   of Medicine, Division of Hematology, Oncology and Transplantation  [facility]     -------------------------------      History: Joshua Gill Jose is a 60 year old healthy man who presented in mid November, 2020, with complaints of left leg pain.  He reports that he initially was concerned that the pain was related to long periods of standing for work.  I does not recall any injuries that occurred that started the pain.  He reported no chest pain, shortness of breath or cough.  Prior to this onset of pain he reports no travel for more than 4hours.  He had not been in the hospital or had surgery in the month preceding this.  Ultrasound revealed the presence of deep vein thrombosis primarily in the peroneal veins but did extend slightly into the distal end of the popliteal vein as well.    Joshua reports that he took his Eliquis for 20 days.  This ended in early December, 2020.  Since then he reports no problems with the current pain or swelling in the leg.  He has no chest pain or shortness of breath.  He has been taking a baby aspirin.    Aware of any family members with history of clots.  He not aware of anyone taking anticoagulants.  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  He stands the entire duration of the shift.  He does not smoke (quit 2 years ago).    Physical Exam:  Exam:  There is no height or weight on file to calculate BMI.   Constitutional: Appears well, no distress  Eyes: no discharge, injection or icterus  Respiratory: no cough or labored breathing  Skin: no rashes or  petechiae  Neurological: no deficits appreciated, speech is fluent  Psych: affect is normal    Labs:  Results for CHARY FERRARA SR. (MRN [id number]) as of 1/21/2021 15:10   Ref. Range 12/3/2020 14:53   Creatinine Latest Ref Range: 0.66 - 1.25 mg/dL 0.86       Imaging:  I reviewed his ultrasound images demonstrating the clot which is primarily per distal.  He does have some extension into the popliteal vein which is nonocclusive.  Therefore my conclusion of this is primarily a distal clot.    Video-Visit Details:    Type of service:  Video Visit  Video Start Time: 301 PM  Video End Time (time video stopped): 321 PM    Originating Location (pt. Location): Home    Distant Location (provider location):  [facility]     Mode of Communication:  Video Conference via Naked

## 2021-04-26 ENCOUNTER — IMMUNIZATION (OUTPATIENT)
Dept: PEDIATRICS | Facility: CLINIC | Age: 61
End: 2021-04-26
Payer: COMMERCIAL

## 2021-04-26 PROCEDURE — 91300 PR COVID VAC PFIZER DIL RECON 30 MCG/0.3 ML IM: CPT

## 2021-04-26 PROCEDURE — 0001A PR COVID VAC PFIZER DIL RECON 30 MCG/0.3 ML IM: CPT

## 2021-05-17 ENCOUNTER — IMMUNIZATION (OUTPATIENT)
Dept: PEDIATRICS | Facility: CLINIC | Age: 61
End: 2021-05-17
Attending: INTERNAL MEDICINE
Payer: COMMERCIAL

## 2021-05-17 PROCEDURE — 0002A PR COVID VAC PFIZER DIL RECON 30 MCG/0.3 ML IM: CPT

## 2021-05-17 PROCEDURE — 91300 PR COVID VAC PFIZER DIL RECON 30 MCG/0.3 ML IM: CPT

## 2021-09-28 ENCOUNTER — OFFICE VISIT (OUTPATIENT)
Dept: FAMILY MEDICINE | Facility: CLINIC | Age: 61
End: 2021-09-28
Payer: COMMERCIAL

## 2021-09-28 VITALS
BODY MASS INDEX: 30.92 KG/M2 | RESPIRATION RATE: 20 BRPM | WEIGHT: 224.8 LBS | SYSTOLIC BLOOD PRESSURE: 138 MMHG | DIASTOLIC BLOOD PRESSURE: 77 MMHG | HEART RATE: 93 BPM | TEMPERATURE: 98.2 F

## 2021-09-28 DIAGNOSIS — N50.89 SCROTAL MASS: Primary | ICD-10-CM

## 2021-09-28 DIAGNOSIS — Z12.11 SPECIAL SCREENING FOR MALIGNANT NEOPLASMS, COLON: ICD-10-CM

## 2021-09-28 PROCEDURE — 99213 OFFICE O/P EST LOW 20 MIN: CPT | Performed by: FAMILY MEDICINE

## 2021-09-28 RX ORDER — OMEGA-3 FATTY ACIDS/FISH OIL 300-1000MG
800 CAPSULE ORAL EVERY 4 HOURS PRN
COMMUNITY
End: 2023-12-20

## 2021-09-28 ASSESSMENT — PAIN SCALES - GENERAL: PAINLEVEL: NO PAIN (0)

## 2021-09-28 NOTE — NURSING NOTE
"Chief Complaint   Patient presents with     Mass       Initial /77   Pulse 93   Temp 98.2  F (36.8  C) (Oral)   Resp 20   Wt 102 kg (224 lb 12.8 oz)   BMI 30.92 kg/m   Estimated body mass index is 30.92 kg/m  as calculated from the following:    Height as of 11/13/20: 1.816 m (5' 11.5\").    Weight as of this encounter: 102 kg (224 lb 12.8 oz).  Medication Reconciliation: complete  Ricardo Albert CMA    "

## 2021-09-28 NOTE — PROGRESS NOTES
"    Assessment & Plan     (N50.89) Scrotal mass  (primary encounter diagnosis)  Comment: painless, h/o hernia repair as child  Plan: US Testicular & Scrotum w Doppler Ltd, Adult         Urology Referral        Urology referral to be scheduled after US    (Z12.11) Special screening for malignant neoplasms, colon  Comment: due  Plan: BHARATHI([employer])                        BMI:   Estimated body mass index is 30.92 kg/m  as calculated from the following:    Height as of 11/13/20: 1.816 m (5' 11.5\").    Weight as of this encounter: 102 kg (224 lb 12.8 oz).   Weight management plan: Discussed healthy diet and exercise guidelines    Patient Instructions       Please  call [phone] to schedule your ultrasound for your leg and scrotum.              Return in about 1 week (around 10/5/2021) for results.    Jill Vega MD  [facility]    Zachery Lewis is a 61 year old who presents for the following health issues     HPI     Concern - Testicle lump   Onset: unknown   Description: right side testicle   Intensity: only occasional groin pain   Progression of Symptoms:  Unknown.  Feels lump stays the same   Accompanying Signs & Symptoms: some previous ED  Previous history of similar problem: history of hernia repair as child   Precipitating factors:        Worsened by: n/a  Alleviating factors:        Improved by: n/a  Therapies tried and outcome: None    No pain or swelling.         Review of Systems   Constitutional, HEENT, cardiovascular, pulmonary, gi and gu systems are negative, except as otherwise noted.      Objective    /77   Pulse 93   Temp 98.2  F (36.8  C) (Oral)   Resp 20   Wt 102 kg (224 lb 12.8 oz)   BMI 30.92 kg/m    Body mass index is 30.92 kg/m .  Physical Exam   GENERAL: healthy, alert and no distress  EYES: Eyes grossly normal to inspection, PERRL and conjunctivae and sclerae normal   (male): normal male genitalia without lesions or urethral discharge, no " hernia, 1.5 cm firm mass separate from right testicle though may be pedunculated from testicle, no pain. O/w normal testicular exam  MS: no gross musculoskeletal defects noted, no edema  SKIN: no suspicious lesions or rashes  PSYCH: mentation appears normal, affect normal/bright

## 2021-10-04 ENCOUNTER — ANCILLARY PROCEDURE (OUTPATIENT)
Dept: ULTRASOUND IMAGING | Facility: CLINIC | Age: 61
End: 2021-10-04
Attending: FAMILY MEDICINE
Payer: COMMERCIAL

## 2021-10-04 ENCOUNTER — ANCILLARY PROCEDURE (OUTPATIENT)
Dept: ULTRASOUND IMAGING | Facility: CLINIC | Age: 61
End: 2021-10-04
Attending: INTERNAL MEDICINE
Payer: COMMERCIAL

## 2021-10-04 DIAGNOSIS — N50.89 SCROTAL MASS: ICD-10-CM

## 2021-10-04 DIAGNOSIS — I82.432 ACUTE DEEP VEIN THROMBOSIS (DVT) OF POPLITEAL VEIN OF LEFT LOWER EXTREMITY (H): ICD-10-CM

## 2021-10-04 PROCEDURE — 93976 VASCULAR STUDY: CPT | Performed by: RADIOLOGY

## 2021-10-04 PROCEDURE — 93971 EXTREMITY STUDY: CPT | Mod: LT | Performed by: RADIOLOGY

## 2021-10-04 PROCEDURE — 76870 US EXAM SCROTUM: CPT | Performed by: RADIOLOGY

## 2021-10-19 LAB — COLOGUARD-ABSTRACT: NEGATIVE

## 2021-10-20 ENCOUNTER — OFFICE VISIT (OUTPATIENT)
Dept: UROLOGY | Facility: CLINIC | Age: 61
End: 2021-10-20
Payer: COMMERCIAL

## 2021-10-20 DIAGNOSIS — N50.89 SCROTAL MASS: ICD-10-CM

## 2021-10-20 PROCEDURE — 99203 OFFICE O/P NEW LOW 30 MIN: CPT | Performed by: UROLOGY

## 2021-10-20 NOTE — LETTER
10/20/2021       RE: Joshua Gill Sr.  [address] [facility] [id number]     Dear Colleague,    Thank you for referring your patient, Joshua Gill Sr., to the [facility]. Please see a copy of my visit note below.    HPI:  Joshua Gill Sr. is a 61 year old male being seen for scrotal mass.  Consult from Jill Vega     Pt noted lump in the right scrotum.   Sometimes he can feel it, sometimes he cannot.  He did have an ultrasound a few weeks ago, which was not revealing of any testicle mass.  There was a 1 cm lump noted in the body of the right epididymis.    Exam:  Physical Exam  There were no vitals taken for this visit.    General: Alert, oriented, nad.  Pleasant and conversant.  Eyes: anicteric, EOMI.  Pulse: regular  Resps: normal, non-labored.  Abdomen:  Nondistended.  Neurological - no tremors  Skin - no discoloration/ lesions noted   exam   Phallus normal   Testes ++, anodular, nontender.  Vas and epididymis present and normal on the left.  On the right testis, I feel a pea-sized nontender lump near the head of the epididymis, also a approximately 2.5 cm lump off the lower pole of the right testicle, it feels like this is perhaps some clotted varicose veins or a thickened tail of the epididymis/convoluted vas.  This is nontender.  There is a similar spot near the tail of the left epididymis, but the left is not as large or prominent.    CRYS deferred.     Review of Imaging:  The following imaging exams were independently viewed and interpreted by me and discussed with patient:  Scrotal ultrasound 10/4/21:                                                                   IMPRESSION:   1. 1.0 cm right epididymal body hypoechoic lesion, indeterminate,  could represent small epididymal tumor like adenomatoid tumor of the  epididymis versus infection.  2. Small bilateral hydroceles.  3.  Mild left varicocele. No right varicocele.    To me this looks like probably a small epididymal head cyst / early spermatocele or sperm granuloma.    Review of Labs:  The following labs were reviewed by me and discussed with the patient:  N/A     Assessment & Plan   1. 1cm right epididymis mass- likely spermatocele or granuloma.  Paratesticular lump near the tail of the right epididymis (acue issues).  I discussed with him these are very unlikely to be malignancy.  a. Advised observation, self exam, and repeat scrotal ultrasound in 3-6 months.  2. Incidental left varicocele noted on scrotal ultrasound. (chronic stable issue)     Umberto Doan MD  [facility]      ==========================      Additional Coding Information:    Problems:  3 -- one acute uncomplicated illness or injury    Data Reviewed  Review of the result(s) of each unique test - Scrotal ultrasound    Tests ordered: Repeat scrotal ultrasound    Independent interpretation of a test performed by another physician/other qualified health care professional (not separately reported) -I relooked at the ultrasound images from 10/14/2021.  The small circular mass in the epididymal body is likely a granuloma or early spermatocele.  I do not see any obvious right varicocele, and I do not see that they pointed out the tail of the right epididymis which feels thickened on exam    Level of risk:  3 -- low risk (e.g., OTC medication or observation, minor surgery without risks)    Time spent:  20 minutes spent on the date of the encounter doing chart review, history and exam, documentation and further activities per the note              Again, thank you for allowing me to participate in the care of your patient.      Sincerely,    Umberto Doan MD

## 2021-10-20 NOTE — NURSING NOTE
Joshua Gill Sr.'s goals for this visit include:   Chief Complaint   Patient presents with     New Patient     right side scrotal mass, no pain        He requests these members of his care team be copied on today's visit information:     PCP: Vinny Singh    Referring Provider:  Jill Vega MD  [address] [place] [address]    There were no vitals taken for this visit.    Do you need any medication refills at today's visit?     Milvia Paredes LPN on 10/20/2021 at 1:09 PM

## 2021-10-20 NOTE — PROGRESS NOTES
HPI:  Joshua Gill Sr. is a 61 year old male being seen for scrotal mass.  Consult from Jill Vega     Pt noted lump in the right scrotum.   Sometimes he can feel it, sometimes he cannot.  He did have an ultrasound a few weeks ago, which was not revealing of any testicle mass.  There was a 1 cm lump noted in the body of the right epididymis.    Exam:  Physical Exam  There were no vitals taken for this visit.    General: Alert, oriented, nad.  Pleasant and conversant.  Eyes: anicteric, EOMI.  Pulse: regular  Resps: normal, non-labored.  Abdomen:  Nondistended.  Neurological - no tremors  Skin - no discoloration/ lesions noted   exam   Phallus normal   Testes ++, anodular, nontender.  Vas and epididymis present and normal on the left.  On the right testis, I feel a pea-sized nontender lump near the head of the epididymis, also a approximately 2.5 cm lump off the lower pole of the right testicle, it feels like this is perhaps some clotted varicose veins or a thickened tail of the epididymis/convoluted vas.  This is nontender.  There is a similar spot near the tail of the left epididymis, but the left is not as large or prominent.    CRYS deferred.     Review of Imaging:  The following imaging exams were independently viewed and interpreted by me and discussed with patient:  Scrotal ultrasound 10/4/21:                                                                   IMPRESSION:   1. 1.0 cm right epididymal body hypoechoic lesion, indeterminate,  could represent small epididymal tumor like adenomatoid tumor of the  epididymis versus infection.  2. Small bilateral hydroceles.  3. Mild left varicocele. No right varicocele.    To me this looks like probably a small epididymal head cyst / early spermatocele or sperm granuloma.    Review of Labs:  The following labs were reviewed by me and discussed with the patient:  N/A     Assessment & Plan   1. 1cm right epididymis mass- likely spermatocele or granuloma.   Paratesticular lump near the tail of the right epididymis (acue issues).  I discussed with him these are very unlikely to be malignancy.  a. Advised observation, self exam, and repeat scrotal ultrasound in 3-6 months.  2. Incidental left varicocele noted on scrotal ultrasound. (chronic stable issue)     Umberto Doan MD  [facility]      ==========================      Additional Coding Information:    Problems:  3 -- one acute uncomplicated illness or injury    Data Reviewed  Review of the result(s) of each unique test - Scrotal ultrasound    Tests ordered: Repeat scrotal ultrasound    Independent interpretation of a test performed by another physician/other qualified health care professional (not separately reported) -I relooked at the ultrasound images from 10/14/2021.  The small circular mass in the epididymal body is likely a granuloma or early spermatocele.  I do not see any obvious right varicocele, and I do not see that they pointed out the tail of the right epididymis which feels thickened on exam    Level of risk:  3 -- low risk (e.g., OTC medication or observation, minor surgery without risks)    Time spent:  20 minutes spent on the date of the encounter doing chart review, history and exam, documentation and further activities per the note

## 2021-11-03 ENCOUNTER — OFFICE VISIT (OUTPATIENT)
Dept: FAMILY MEDICINE | Facility: CLINIC | Age: 61
End: 2021-11-03
Payer: COMMERCIAL

## 2021-11-03 VITALS
WEIGHT: 228 LBS | BODY MASS INDEX: 30.88 KG/M2 | DIASTOLIC BLOOD PRESSURE: 60 MMHG | TEMPERATURE: 97.1 F | SYSTOLIC BLOOD PRESSURE: 134 MMHG | HEIGHT: 72 IN | RESPIRATION RATE: 16 BRPM | OXYGEN SATURATION: 95 % | HEART RATE: 83 BPM

## 2021-11-03 DIAGNOSIS — Z00.00 ROUTINE GENERAL MEDICAL EXAMINATION AT A HEALTH CARE FACILITY: Primary | ICD-10-CM

## 2021-11-03 DIAGNOSIS — I10 HYPERTENSION GOAL BP (BLOOD PRESSURE) < 140/90: ICD-10-CM

## 2021-11-03 PROCEDURE — 90471 IMMUNIZATION ADMIN: CPT | Performed by: FAMILY MEDICINE

## 2021-11-03 PROCEDURE — 99396 PREV VISIT EST AGE 40-64: CPT | Mod: 25 | Performed by: FAMILY MEDICINE

## 2021-11-03 PROCEDURE — 90715 TDAP VACCINE 7 YRS/> IM: CPT | Performed by: FAMILY MEDICINE

## 2021-11-03 RX ORDER — LOSARTAN POTASSIUM 25 MG/1
25 TABLET ORAL DAILY
Qty: 90 TABLET | Refills: 3 | Status: SHIPPED | OUTPATIENT
Start: 2021-11-03 | End: 2022-10-21

## 2021-11-03 ASSESSMENT — MIFFLIN-ST. JEOR: SCORE: 1874.26

## 2021-11-03 NOTE — PATIENT INSTRUCTIONS
1. Follow a healthy diet - reliable information is available at: myplate.gov.    2. Get regular exercise based on your abilities like walking, jogging, biking, swimming and strength training (150 minutes per week).      3. Avoid the sun or otherwise use sun screen to prevent skin cancer.    4. See the dentist and eye doctor regularly.     5. Advanced directive or living will is a good idea. Fill out the form by going to: [email or web address], then have it notarized and send me a copy.     6. Wear compression socks, on in the morning and off at bedtime.    7. Start losartan 25 mg daily for blood pressure.    8. Check home blood pressure a couple of times per week - ideally 130/80 or less but if 140/90 or higher persistently, please let me know.    9. I will contact you with results. If all is well, see you in one year for a physical with fasting labs.

## 2021-11-03 NOTE — PROGRESS NOTES
SUBJECTIVE:   CC: Joshua Gill . is an 61 year old male who presents for preventative health visit.       Patient has been advised of split billing requirements and indicates understanding: Yes  Healthy Habits:    Getting at least 3 servings of Calcium per day:  Yes    Bi-annual eye exam:  NO    Dental care twice a year:  Yes    Sleep apnea or symptoms of sleep apnea:  None    Diet:  Regular (no restrictions)    Frequency of exercise:  4-5 days/week    Duration of exercise:  45-60 minutes    Taking medications regularly:  No    Barriers to taking medications:  None    Medication side effects:  None    PHQ-2 Total Score:    Additional concerns today:  Yes (ear painful, cough, throat hurts)    URI - a few days, mild sore throat, ears plugged, cough. No shortness of breath. No loss of taste or smell. No fevers. No headaches.  Evaluation and treatment:    Declines covid test.   Should be self limited. OTC meds prn.    HTN - new dx based on previous BP readings.  Evaluation and treatment:    Start Losartan 25 mg daily.   Check at home and report if high.    BP Readings from Last 6 Encounters:   11/03/21 134/60   09/28/21 138/77   12/03/20 130/88   11/13/20 (!) 142/80   03/26/19 133/80   06/05/17 116/69     Last Comprehensive Metabolic Panel:  Sodium   Date Value Ref Range Status   12/03/2020 141 133 - 144 mmol/L Final     Potassium   Date Value Ref Range Status   12/03/2020 3.8 3.4 - 5.3 mmol/L Final     Chloride   Date Value Ref Range Status   12/03/2020 108 94 - 109 mmol/L Final     Carbon Dioxide   Date Value Ref Range Status   12/03/2020 28 20 - 32 mmol/L Final     Anion Gap   Date Value Ref Range Status   12/03/2020 5 3 - 14 mmol/L Final     Glucose   Date Value Ref Range Status   12/03/2020 79 70 - 99 mg/dL Final     Comment:     Fasting specimen     Urea Nitrogen   Date Value Ref Range Status   12/03/2020 26 7 - 30 mg/dL Final     Creatinine   Date Value Ref Range Status   12/03/2020 0.86 0.66 - 1.25  mg/dL Final     GFR Estimate   Date Value Ref Range Status   12/03/2020 >90 >60 mL/min/[1.73_m2] Final     Comment:     Non  GFR Calc  Starting 12/18/2018, serum creatinine based estimated GFR (eGFR) will be   calculated using the Chronic Kidney Disease Epidemiology Collaboration   (CKD-EPI) equation.       Calcium   Date Value Ref Range Status   12/03/2020 8.4 (L) 8.5 - 10.1 mg/dL Final     Obesity -    Diet and exercise discussed.   Snores but denies daytime sleepiness.     Body mass index is 31.36 kg/m .    Wt Readings from Last 5 Encounters:   11/03/21 103.4 kg (228 lb)   09/28/21 102 kg (224 lb 12.8 oz)   11/13/20 99.3 kg (219 lb)   03/26/19 99.8 kg (220 lb)   12/11/17 95.3 kg (210 lb 3.2 oz)       Varicose veins, previous left leg DVT -    Evaluation and treatment:   U/S 11/19/20 - left politeal veing DVT extends to proximal calf.   Treated with Eliquis.    U/S 10/4/21 negative for DVT   Since unprovoked saw hematology - intermittent prophylaxis was advised with car/plan rides 4 hours or with surgeries.    Scrotal mass -  Evaluation and treatment:    U/S 10/4/21 - indeterminate mass   Follows with urology    Left shoulder rotator cuff tear -    surgery on 12/1/16     Right shoulder    surgery 10/9/14 with good results.    H/O Tobacco abuse - started since age 17. Average 1 ppd. Quit smoking 9/14/16.     Discussed lung CA screen - declines lung CT.    Preventive - Tdap given today.    Immunization History   Administered Date(s) Administered     COVID-19,PF,Pfizer (12+ Yrs) 04/26/2021, 05/17/2021     Influenza Quad, Recombinant, pf(RIV4) (Flublok) 09/23/2020, 09/28/2021     Influenza Vaccine, 6+MO IM (QUADRIVALENT W/PRESERVATIVES) 09/28/2021     Tdap (Adacel,Boostrix) 05/13/2011, 11/03/2021     Zoster vaccine recombinant adjuvanted (SHINGRIX) 09/23/2020, 11/24/2020     -STD screen: declines    - Colon CA screen: declines Colonoscopy. cologuard negative 10/19/21 - repeat in 3 years.     - Prostate  CA screen: Discussed controversy about screening.     No results found for: PSA    - Hep C screen: ordered with future.    - Advanced Directive: referred today.    - Lung cancer screen: per HPI    -lipids screen: ordered future since not fasting today.    Recent Labs   Lab Test 20  1453   CHOL 155   HDL 43   LDL 95   TRIG 86     SH:    Marital status:   Kids: 3 + 3  Employment: planning to retire at age 63  Exercise:   Tobacco: per HPI  Etoh: occ  Recreational drugs: no  Caffeine:       Today's PHQ-2 Score:   PHQ-2 (  Pfizer) 2021   Q1: Little interest or pleasure in doing things 0   Q2: Feeling down, depressed or hopeless 0   PHQ-2 Score 0       Abuse: Current or Past(Physical, Sexual or Emotional)- No  Do you feel safe in your environment? Yes    Have you ever done Advance Care Planning? (For example, a Health Directive, POLST, or a discussion with a medical provider or your loved ones about your wishes): No, advance care planning information given to patient to review.  Patient declined advance care planning discussion at this time.    Social History     Tobacco Use     Smoking status: Former Smoker     Packs/day: 0.00     Types: Cigarettes     Quit date: 2016     Years since quittin.1     Smokeless tobacco: Never Used   Substance Use Topics     Alcohol use: Yes     Comment: occ.         No flowsheet data found.    Last PSA: No results found for: PSA    Reviewed orders with patient. Reviewed health maintenance and updated orders accordingly - Yes      Reviewed and updated as needed this visit by clinical staff                 Reviewed and updated as needed this visit by Provider                    Review of Systems  CONSTITUTIONAL: NEGATIVE for fever, chills, change in weight  INTEGUMENTARY/SKIN: NEGATIVE for worrisome rashes, moles or lesions  EYES: NEGATIVE for vision changes or irritation  ENT: NEGATIVE for ear, mouth and throat problems  RESP: NEGATIVE for significant cough or  "SOB  CV: NEGATIVE for chest pain, palpitations or peripheral edema  GI: NEGATIVE for nausea, abdominal pain, heartburn, or change in bowel habits   male: negative for dysuria, hematuria, decreased urinary stream, erectile dysfunction, urethral discharge  MUSCULOSKELETAL: NEGATIVE for significant arthralgias or myalgia  NEURO: NEGATIVE for weakness, dizziness or paresthesias  PSYCHIATRIC: NEGATIVE for changes in mood or affect    OBJECTIVE:   /60   Pulse 83   Temp 97.1  F (36.2  C) (Tympanic)   Resp 16   Ht 1.816 m (5' 11.5\")   Wt 103.4 kg (228 lb)   SpO2 95%   BMI 31.36 kg/m      Physical Exam  GENERAL: healthy, alert and no distress  EYES: Eyes grossly normal to inspection, PERRL and conjunctivae and sclerae normal  HENT: ear canals and TM's normal, nose and mouth without ulcers or lesions  NECK: no adenopathy, no asymmetry, masses, or scars and thyroid normal to palpation  RESP: lungs clear to auscultation - no rales, rhonchi or wheezes  CV: regular rate and rhythm, normal S1 S2, no S3 or S4, no murmur, click or rub, no peripheral edema and peripheral pulses strong. Varicose veins both legs.  ABDOMEN: soft, nontender, no hepatosplenomegaly, no masses and bowel sounds normal  MS: no gross musculoskeletal defects noted, no edema  SKIN: no suspicious lesions or rashes  NEURO: Normal strength and tone, mentation intact and speech normal  PSYCH: mentation appears normal, affect normal/bright    ASSESSMENT/PLAN:     COUNSELING:   Reviewed preventive health counseling, as reflected in patient instructions       Regular exercise       Healthy diet/nutrition    Estimated body mass index is 30.92 kg/m  as calculated from the following:    Height as of 11/13/20: 1.816 m (5' 11.5\").    Weight as of 9/28/21: 102 kg (224 lb 12.8 oz).     Weight management plan: Discussed healthy diet and exercise guidelines    He reports that he quit smoking about 5 years ago. His smoking use included cigarettes. He smoked 0.00 " packs per day. He has never used smokeless tobacco.    Assessment and Plan - Decision Making    1. Routine general medical examination at a health care facility    Results of today's exam given to patient verbally along with age appropriate preventive measures. Written instructions reviewed and handed to the patient.    - Basic metabolic panel; Future  - CBC with Platelets & Differential; Future  - Lipid panel reflex to direct LDL Fasting; Future  - Prostate Specific Antigen Screen; Future  - Hepatitis C antibody; Future    2. Hypertension goal BP (blood pressure) < 140/90    Per HPI    - losartan (COZAAR) 25 MG tablet; Take 1 tablet (25 mg) by mouth daily  Dispense: 90 tablet; Refill: 3      Written instructions given as follows:    Patient Instructions   1. Follow a healthy diet - reliable information is available at: myplate.gov.    2. Get regular exercise based on your abilities like walking, jogging, biking, swimming and strength training (150 minutes per week).      3. Avoid the sun or otherwise use sun screen to prevent skin cancer.    4. See the dentist and eye doctor regularly.     5. Advanced directive or living will is a good idea. Fill out the form by going to: [email or web address], then have it notarized and send me a copy.     6. Wear compression socks, on in the morning and off at bedtime.    7. Start losartan 25 mg daily for blood pressure.    8. Check home blood pressure a couple of times per week - ideally 130/80 or less but if 140/90 or higher persistently, please let me know.    9. I will contact you with results. If all is well, see you in one year for a physical with fasting labs.

## 2021-11-20 ENCOUNTER — LAB (OUTPATIENT)
Dept: LAB | Facility: CLINIC | Age: 61
End: 2021-11-20
Payer: COMMERCIAL

## 2021-11-20 DIAGNOSIS — Z00.00 ROUTINE GENERAL MEDICAL EXAMINATION AT A HEALTH CARE FACILITY: ICD-10-CM

## 2021-11-20 LAB
BASOPHILS # BLD AUTO: 0 10E3/UL (ref 0–0.2)
BASOPHILS NFR BLD AUTO: 1 %
EOSINOPHIL # BLD AUTO: 0.2 10E3/UL (ref 0–0.7)
EOSINOPHIL NFR BLD AUTO: 3 %
ERYTHROCYTE [DISTWIDTH] IN BLOOD BY AUTOMATED COUNT: 12.9 % (ref 10–15)
HCT VFR BLD AUTO: 36.2 % (ref 40–53)
HGB BLD-MCNC: 12 G/DL (ref 13.3–17.7)
LYMPHOCYTES # BLD AUTO: 1.9 10E3/UL (ref 0.8–5.3)
LYMPHOCYTES NFR BLD AUTO: 39 %
MCH RBC QN AUTO: 29.2 PG (ref 26.5–33)
MCHC RBC AUTO-ENTMCNC: 33.1 G/DL (ref 31.5–36.5)
MCV RBC AUTO: 88 FL (ref 78–100)
MONOCYTES # BLD AUTO: 0.6 10E3/UL (ref 0–1.3)
MONOCYTES NFR BLD AUTO: 11 %
NEUTROPHILS # BLD AUTO: 2.2 10E3/UL (ref 1.6–8.3)
NEUTROPHILS NFR BLD AUTO: 46 %
PLATELET # BLD AUTO: 239 10E3/UL (ref 150–450)
RBC # BLD AUTO: 4.11 10E6/UL (ref 4.4–5.9)
WBC # BLD AUTO: 4.8 10E3/UL (ref 4–11)

## 2021-11-20 PROCEDURE — 80048 BASIC METABOLIC PNL TOTAL CA: CPT

## 2021-11-20 PROCEDURE — G0103 PSA SCREENING: HCPCS

## 2021-11-20 PROCEDURE — 80061 LIPID PANEL: CPT

## 2021-11-20 PROCEDURE — 85025 COMPLETE CBC W/AUTO DIFF WBC: CPT

## 2021-11-20 PROCEDURE — 86803 HEPATITIS C AB TEST: CPT

## 2021-11-20 PROCEDURE — 36415 COLL VENOUS BLD VENIPUNCTURE: CPT

## 2021-11-22 LAB
ANION GAP SERPL CALCULATED.3IONS-SCNC: 5 MMOL/L (ref 3–14)
BUN SERPL-MCNC: 23 MG/DL (ref 7–30)
CALCIUM SERPL-MCNC: 8.5 MG/DL (ref 8.5–10.1)
CHLORIDE BLD-SCNC: 107 MMOL/L (ref 94–109)
CHOLEST SERPL-MCNC: 143 MG/DL
CO2 SERPL-SCNC: 27 MMOL/L (ref 20–32)
CREAT SERPL-MCNC: 0.79 MG/DL (ref 0.66–1.25)
FASTING STATUS PATIENT QL REPORTED: YES
GFR SERPL CREATININE-BSD FRML MDRD: >90 ML/MIN/1.73M2
GLUCOSE BLD-MCNC: 78 MG/DL (ref 70–99)
HCV AB SERPL QL IA: NONREACTIVE
HDLC SERPL-MCNC: 42 MG/DL
LDLC SERPL CALC-MCNC: 89 MG/DL
NONHDLC SERPL-MCNC: 101 MG/DL
POTASSIUM BLD-SCNC: 3.8 MMOL/L (ref 3.4–5.3)
PSA SERPL-MCNC: 0.76 UG/L (ref 0–4)
SODIUM SERPL-SCNC: 139 MMOL/L (ref 133–144)
TRIGL SERPL-MCNC: 62 MG/DL

## 2021-11-23 DIAGNOSIS — D64.9 ANEMIA, UNSPECIFIED TYPE: Primary | ICD-10-CM

## 2021-11-23 NOTE — RESULT ENCOUNTER NOTE
Maurice Lewis,     I called your phone and left you a message as well.     Your hemoglobin (red blood cell count) was a bit low. I would like to repeat this test in about one month. Please set up a lab appointment (does not have to be fasting). I will then contact you with results along with further instructions.     All other results were fine.     Regards,     Vinny Singh M.D.

## 2021-12-08 ENCOUNTER — LAB (OUTPATIENT)
Dept: LAB | Facility: CLINIC | Age: 61
End: 2021-12-08
Payer: COMMERCIAL

## 2021-12-08 DIAGNOSIS — D64.9 ANEMIA, UNSPECIFIED TYPE: ICD-10-CM

## 2021-12-08 LAB
BASOPHILS # BLD AUTO: 0 10E3/UL (ref 0–0.2)
BASOPHILS NFR BLD AUTO: 0 %
EOSINOPHIL # BLD AUTO: 0.1 10E3/UL (ref 0–0.7)
EOSINOPHIL NFR BLD AUTO: 3 %
ERYTHROCYTE [DISTWIDTH] IN BLOOD BY AUTOMATED COUNT: 12.8 % (ref 10–15)
FOLATE SERPL-MCNC: 23.5 NG/ML
HCT VFR BLD AUTO: 36.9 % (ref 40–53)
HGB BLD-MCNC: 12.3 G/DL (ref 13.3–17.7)
IRON SATN MFR SERPL: 16 % (ref 15–46)
IRON SERPL-MCNC: 52 UG/DL (ref 35–180)
LYMPHOCYTES # BLD AUTO: 1.9 10E3/UL (ref 0.8–5.3)
LYMPHOCYTES NFR BLD AUTO: 37 %
MCH RBC QN AUTO: 29.1 PG (ref 26.5–33)
MCHC RBC AUTO-ENTMCNC: 33.3 G/DL (ref 31.5–36.5)
MCV RBC AUTO: 87 FL (ref 78–100)
MONOCYTES # BLD AUTO: 0.6 10E3/UL (ref 0–1.3)
MONOCYTES NFR BLD AUTO: 12 %
NEUTROPHILS # BLD AUTO: 2.5 10E3/UL (ref 1.6–8.3)
NEUTROPHILS NFR BLD AUTO: 48 %
PLATELET # BLD AUTO: 237 10E3/UL (ref 150–450)
RBC # BLD AUTO: 4.23 10E6/UL (ref 4.4–5.9)
TIBC SERPL-MCNC: 321 UG/DL (ref 240–430)
VIT B12 SERPL-MCNC: 423 PG/ML (ref 193–986)
WBC # BLD AUTO: 5.1 10E3/UL (ref 4–11)

## 2021-12-08 PROCEDURE — 82607 VITAMIN B-12: CPT

## 2021-12-08 PROCEDURE — 36415 COLL VENOUS BLD VENIPUNCTURE: CPT

## 2021-12-08 PROCEDURE — 82746 ASSAY OF FOLIC ACID SERUM: CPT

## 2021-12-08 PROCEDURE — 83550 IRON BINDING TEST: CPT

## 2021-12-08 PROCEDURE — 85025 COMPLETE CBC W/AUTO DIFF WBC: CPT

## 2021-12-09 DIAGNOSIS — D64.9 ANEMIA, UNSPECIFIED TYPE: Primary | ICD-10-CM

## 2022-01-07 ENCOUNTER — OFFICE VISIT (OUTPATIENT)
Dept: FAMILY MEDICINE | Facility: CLINIC | Age: 62
End: 2022-01-07
Payer: COMMERCIAL

## 2022-01-07 VITALS
TEMPERATURE: 97.7 F | DIASTOLIC BLOOD PRESSURE: 66 MMHG | RESPIRATION RATE: 18 BRPM | HEART RATE: 85 BPM | WEIGHT: 231 LBS | BODY MASS INDEX: 31.29 KG/M2 | HEIGHT: 72 IN | OXYGEN SATURATION: 97 % | SYSTOLIC BLOOD PRESSURE: 155 MMHG

## 2022-01-07 DIAGNOSIS — L72.3 INFECTED SEBACEOUS CYST: Primary | ICD-10-CM

## 2022-01-07 DIAGNOSIS — D64.9 ANEMIA, UNSPECIFIED TYPE: ICD-10-CM

## 2022-01-07 DIAGNOSIS — L08.9 INFECTED SEBACEOUS CYST: Primary | ICD-10-CM

## 2022-01-07 PROCEDURE — 99213 OFFICE O/P EST LOW 20 MIN: CPT | Performed by: FAMILY MEDICINE

## 2022-01-07 RX ORDER — CEPHALEXIN 500 MG/1
500 CAPSULE ORAL 2 TIMES DAILY
Qty: 20 CAPSULE | Refills: 0 | Status: SHIPPED | OUTPATIENT
Start: 2022-01-07 | End: 2022-10-20

## 2022-01-07 ASSESSMENT — MIFFLIN-ST. JEOR: SCORE: 1887.87

## 2022-01-07 ASSESSMENT — PAIN SCALES - GENERAL: PAINLEVEL: NO PAIN (0)

## 2022-01-07 NOTE — NURSING NOTE
"Chief Complaint   Patient presents with     Derm Problem     other     follow up anemia       Initial BP (!) 155/66   Pulse 85   Temp 97.7  F (36.5  C) (Tympanic)   Resp 18   Ht 1.816 m (5' 11.5\")   Wt 104.8 kg (231 lb)   SpO2 97%   BMI 31.77 kg/m   Estimated body mass index is 31.77 kg/m  as calculated from the following:    Height as of this encounter: 1.816 m (5' 11.5\").    Weight as of this encounter: 104.8 kg (231 lb).  Medication Reconciliation: complete  Rere Juárez, OPAL  "

## 2022-01-07 NOTE — PATIENT INSTRUCTIONS
If the lump comes back or if it starts draining please schedule an appointment(s) to have it removed.     Please schedule a 40 minute procedure appointment to have the lesion(s) removed. Do not take any aspirin, ibuprofen, naproxen or non steroidal anti-inflammatory drug(s) for 10 day(s) prior as these can caused increased bleeding during the procedure. Tylenol is ok.

## 2022-01-07 NOTE — PROGRESS NOTES
"SUBJECTIVE:  Joshua Gill Sr. is a 61 year old male who scheduled an appointment to discuss the following issues:    28 years ago he had a bad smell coming from the lump on the back of his neck. At the time it was lanced and it has non tender bothered him since until recently.   He reports that he has been getting a little bit of discharge from the lump and it is extremely smelly  He denies any pain   He denies fevers, chills, nausea or vomiting         Past Medical, social, family histories, medications, and allergies reviewed and updated   ROS: other than that noted above all other review of systems was negative    ROS:       Current Outpatient Medications:      ibuprofen (ADVIL/MOTRIN) 200 MG capsule, Take 800 mg by mouth every 4 hours as needed for fever, Disp: , Rfl:      losartan (COZAAR) 25 MG tablet, Take 1 tablet (25 mg) by mouth daily, Disp: 90 tablet, Rfl: 3    OBJECTIVE:  BP (!) 155/66   Pulse 85   Temp 97.7  F (36.5  C) (Tympanic)   Resp 18   Ht 1.816 m (5' 11.5\")   Wt 104.8 kg (231 lb)   SpO2 97%   BMI 31.77 kg/m      EXAM:  GENERAL APPEARANCE: healthy, alert and no distress    NECK: no adenopathy, no asymmetry, masses, or scars and thyroid normal to palpation  NECK: on the right side of his neck he had a small subcutaneous lump from which I could express some thick caseous material which was extremely foul smelling.      No results found for any visits on 01/07/22.      ASSESSMENT/PLAN:    (L72.3,  L08.9) Infected sebaceous cyst  (primary encounter diagnosis)  Comment:   Plan: cephALEXin (KEFLEX) 500 MG capsule          Patient Instructions   If the lump comes back or if it starts draining please schedule an appointment(s) to have it removed.     Please schedule a 40 minute procedure appointment to have the lesion(s) removed. Do not take any aspirin, ibuprofen, naproxen or non steroidal anti-inflammatory drug(s) for 10 day(s) prior as these can caused increased bleeding during the " procedure. Tylenol is ok.                (D64.9) Anemia, unspecified type  Comment: patient sent to laboratory   Plan: per Dr Singh

## 2022-01-11 LAB — HEMOCCULT STL QL IA: NEGATIVE

## 2022-01-11 PROCEDURE — 82274 ASSAY TEST FOR BLOOD FECAL: CPT | Performed by: FAMILY MEDICINE

## 2022-01-11 NOTE — RESULT ENCOUNTER NOTE
I am covering for Dr. Singh while he is away.   Maurice Lewis,       Your recent test results are attached, if you have any questions or concerns please feel free to contact me via e-mail or call [phone].  Negative colon cancer screening.     Sincerely,  Camelia Cote PA-C

## 2022-06-06 ENCOUNTER — IMMUNIZATION (OUTPATIENT)
Dept: NURSING | Facility: CLINIC | Age: 62
End: 2022-06-06
Payer: COMMERCIAL

## 2022-06-06 DIAGNOSIS — Z23 HIGH PRIORITY FOR 2019-NCOV VACCINE: Primary | ICD-10-CM

## 2022-06-06 PROCEDURE — 0054A COVID-19,PF,PFIZER (12+ YRS): CPT

## 2022-06-06 PROCEDURE — 99207 PR NO CHARGE NURSE ONLY: CPT

## 2022-06-06 PROCEDURE — 91305 COVID-19,PF,PFIZER (12+ YRS): CPT

## 2022-10-13 NOTE — PATIENT INSTRUCTIONS
Please remember to call and schedule a follow up appointment if one was recommended at your earliest convenience.  Orthopedics CLINIC HOURS TELEPHONE NUMBER   Dr. Kalia Melchor  Certified Medical Assistant   Monday & Wednesday   8am - 5pm  Thursday 1pm - 5pm  Friday 8am -11:30am Specialty schedulers:   [phone] to schedule your surgery.  Main Clinic:   [phone] to make an appointment with any provider.    Urgent Care locations:    [facility] Monday-Friday Closed  Saturday-Sunday 9am-5pm      Monday-Friday 12pm - 8pm  Saturday-Sunday 9am-5pm [phone]     If SURGERY has been recommended, please call our Specialty Schedulers at [phone] to schedule your procedure.    If you need a medication refill, please contact your pharmacy. Please allow 3 business days for your refill to be completed.    If an MRI or CT scan has been recommended, please call [facility] Schedulers at [phone] to schedule your appointment.  Use [email or web address] (secure e-mail communication and access to your chart) to send a message or to make an appointment. Please ask how you can sign up for [email or web address].  Your care team's suggested websites for health information:   [email or web address] : Up to date and easily searchable information on multiple topics.   [email or web address] : [place] dept of heat, public health issues in [place], N1N1       No

## 2022-10-15 ENCOUNTER — HEALTH MAINTENANCE LETTER (OUTPATIENT)
Age: 62
End: 2022-10-15

## 2022-10-20 ENCOUNTER — OFFICE VISIT (OUTPATIENT)
Dept: URGENT CARE | Facility: URGENT CARE | Age: 62
End: 2022-10-20
Payer: COMMERCIAL

## 2022-10-20 VITALS
TEMPERATURE: 97.8 F | HEART RATE: 74 BPM | OXYGEN SATURATION: 96 % | SYSTOLIC BLOOD PRESSURE: 137 MMHG | BODY MASS INDEX: 30.28 KG/M2 | DIASTOLIC BLOOD PRESSURE: 76 MMHG | WEIGHT: 220.2 LBS | RESPIRATION RATE: 22 BRPM

## 2022-10-20 DIAGNOSIS — L60.0 INGROWN NAIL: ICD-10-CM

## 2022-10-20 DIAGNOSIS — G89.29 CHRONIC BILATERAL LOW BACK PAIN WITHOUT SCIATICA: ICD-10-CM

## 2022-10-20 DIAGNOSIS — L72.3 INFECTED SEBACEOUS CYST: Primary | ICD-10-CM

## 2022-10-20 DIAGNOSIS — L24.5 IRRITANT CONTACT DERMATITIS DUE TO OTHER CHEMICAL PRODUCTS: ICD-10-CM

## 2022-10-20 DIAGNOSIS — L08.9 INFECTED SEBACEOUS CYST: Primary | ICD-10-CM

## 2022-10-20 DIAGNOSIS — M54.50 CHRONIC BILATERAL LOW BACK PAIN WITHOUT SCIATICA: ICD-10-CM

## 2022-10-20 PROCEDURE — 99214 OFFICE O/P EST MOD 30 MIN: CPT

## 2022-10-20 RX ORDER — TRIAMCINOLONE ACETONIDE 0.25 MG/G
OINTMENT TOPICAL 2 TIMES DAILY
Qty: 15 G | Refills: 0 | Status: SHIPPED | OUTPATIENT
Start: 2022-10-20

## 2022-10-20 RX ORDER — CEPHALEXIN 500 MG/1
500 CAPSULE ORAL 2 TIMES DAILY
Qty: 14 CAPSULE | Refills: 0 | Status: SHIPPED | OUTPATIENT
Start: 2022-10-20 | End: 2022-10-27

## 2022-10-20 RX ORDER — CYCLOBENZAPRINE HCL 5 MG
5 TABLET ORAL 3 TIMES DAILY PRN
Qty: 90 TABLET | Refills: 0 | Status: SHIPPED | OUTPATIENT
Start: 2022-10-20 | End: 2022-11-19

## 2022-10-20 NOTE — PROGRESS NOTES
Assessment & Plan     (L72.3,  L08.9) Infected sebaceous cyst  (primary encounter diagnosis)  Plan: cephALEXin (KEFLEX) 500 MG capsule    (M54.50,  G89.29) Chronic bilateral low back pain without sciatica  Plan: cyclobenzaprine (FLEXERIL) 5 MG tablet    (L24.5) Irritant contact dermatitis due to other chemical products  Plan: triamcinolone (KENALOG) 0.025 % external         ointment    (L60.0) Ingrown nail    Discussed epidermoid cyst patient instructions and the indication and course of Keflex treatment.  Discussed low back pain patient instructions, the benefits of physical therapy and the flexeril indications and use.  Discussed the contact dermatitis is likely due to the chemical irritants at his work and the need to wear gloves.  In addition, counseled patient on the need to keep his hands moist with a lotion or aquaphor and avoid using harsh soaps when washing hands.  Discussed the indications and use for triamcinolone ointment.      YAJAIRA Menjivar [facility]    Zachery Lewis is a 62 year old male who presents to clinic today for the following health issues:  Chief Complaint   Patient presents with     Finger     Left index finger, swollen painful, near the nail.     Cyst     Back right side of neck, has been seen for it before. Doctor squeezed it and it came back. Draining.     Back Pain     Low back pain muscle spasms. Happened today. Has happened before.     HPI    Left index finger swelling and throbbing for 2 weeks.  The patient took a nail clipper and clipped the nail and skin.  He is wondering if he has an infection due to the pain, redness and swelling.    Patient reports a small lump on the right sided of the back of his neck. No pain, denies growing in size and indicates he can squeeze out minimal amount of drainage.      Lower back pain, described as sore, sitting exacerbates the pain, moving around helps with the pain  Denies any  numbness/tinglinig in legs.  Cyclobenzaprine has worked well in the past.  Patient feels as though this is similar to his back pain in the past.  Patient has PT option at work that he will consider using.      Concerned with the area of redness and itching in between the fingers of his right hand due to chemical exposures at work.         Objective    /76   Pulse 74   Temp 97.8  F (36.6  C) (Tympanic)   Resp 22   Wt 99.9 kg (220 lb 3.2 oz)   SpO2 96%   BMI 30.28 kg/m    Physical Exam     GENERAL: healthy, alert and no distress  MS: normal range of motion, gait normal, no ataxia and spine exam shows lower back pain upon right lateral bending and bilateral straight leg raises with resistance.  ARYA and FADIR negative.  SKIN: no suspicious lesions or rashes and small, non-painful movable nodule on the posterior portion of the patient's neck that was reduced in size after expressing minimal amount of opaque yellow fluid.    Left index finger has edema, is erythematous and is warm to touch   NEURO: Normal strength and tone, mentation intact and speech normal  PSYCH: mentation appears normal, affect normal/bright

## 2022-12-03 ENCOUNTER — HEALTH MAINTENANCE LETTER (OUTPATIENT)
Age: 62
End: 2022-12-03

## 2023-01-26 DIAGNOSIS — I10 HYPERTENSION GOAL BP (BLOOD PRESSURE) < 140/90: ICD-10-CM

## 2023-01-30 RX ORDER — LOSARTAN POTASSIUM 25 MG/1
TABLET ORAL
Qty: 90 TABLET | Refills: 0 | Status: SHIPPED | OUTPATIENT
Start: 2023-01-30 | End: 2023-03-22

## 2023-01-30 NOTE — TELEPHONE ENCOUNTER
"Requested Prescriptions   Pending Prescriptions Disp Refills    losartan (COZAAR) 25 MG tablet [Pharmacy Med Name: LOSARTAN POTASSIUM 25MG TABS] 90 tablet 0     Sig: TAKE 1 TABLET (25 MG) BY MOUTH DAILY** PLEASE SCHEDULE APPOINTMENT FOR REFILLS**       Angiotensin-II Receptors Failed - 1/28/2023 11:08 AM        Failed - Recent (12 mo) or future (30 days) visit within the authorizing provider's specialty     Patient has had an office visit with the authorizing provider or a provider within the authorizing providers department within the previous 12 mos or has a future within next 30 days. See \"Patient Info\" tab in inbasket, or \"Choose Columns\" in Meds & Orders section of the refill encounter.              Failed - Normal serum creatinine on file in past 12 months     Recent Labs   Lab Test 11/20/21  1108   CR 0.79       Ok to refill medication if creatinine is low          Failed - Normal serum potassium on file in past 12 months     Recent Labs   Lab Test 11/20/21  1108   POTASSIUM 3.8                    Passed - Last blood pressure under 140/90 in past 12 months     BP Readings from Last 3 Encounters:   10/20/22 137/76   01/07/22 (!) 155/66   11/03/21 134/60                 Passed - Medication is active on med list        Passed - Patient is age 18 or older           Tayo Hurd RN BSN  [facility]    "

## 2023-03-15 ENCOUNTER — OFFICE VISIT (OUTPATIENT)
Dept: URGENT CARE | Facility: URGENT CARE | Age: 63
End: 2023-03-15
Payer: COMMERCIAL

## 2023-03-15 VITALS
SYSTOLIC BLOOD PRESSURE: 129 MMHG | TEMPERATURE: 97.8 F | BODY MASS INDEX: 30.15 KG/M2 | DIASTOLIC BLOOD PRESSURE: 76 MMHG | HEART RATE: 75 BPM | WEIGHT: 219.2 LBS | OXYGEN SATURATION: 97 %

## 2023-03-15 DIAGNOSIS — R20.0 LEFT ARM NUMBNESS: ICD-10-CM

## 2023-03-15 DIAGNOSIS — R07.9 CHEST PAIN, UNSPECIFIED TYPE: Primary | ICD-10-CM

## 2023-03-15 DIAGNOSIS — I10 BENIGN ESSENTIAL HYPERTENSION: ICD-10-CM

## 2023-03-15 DIAGNOSIS — I82.432 ACUTE DEEP VEIN THROMBOSIS (DVT) OF POPLITEAL VEIN OF LEFT LOWER EXTREMITY (H): ICD-10-CM

## 2023-03-15 PROCEDURE — 99215 OFFICE O/P EST HI 40 MIN: CPT | Performed by: NURSE PRACTITIONER

## 2023-03-15 PROCEDURE — 93000 ELECTROCARDIOGRAM COMPLETE: CPT | Performed by: NURSE PRACTITIONER

## 2023-03-15 NOTE — PATIENT INSTRUCTIONS
Discussed in detail symptoms that would warrant emergent evaluation in the ED. Patient agrees with plan and will follow up as needed.

## 2023-03-15 NOTE — PROGRESS NOTES
Assessment & Plan     1. Chest pain, unspecified type    - EKG 12-lead complete w/read - Clinics    2. Left arm numbness      3. Acute deep vein thrombosis (DVT) of popliteal vein of left lower extremity (H)      4. Benign essential hypertension      Patient with history of DVT and hypertension couple week history of intermittent chest pain as noted. Recommend patient be seen through emergency department as we cannot rule out cardiac involvement or PE through urgent care patient verbalized understanding he did drive himself to urgent care however will be able to have his sister meet him at emergency department he is planning on going to [facility].    Discussed risks of waiting to be seen such as cardiac arrest, pulmonary embolism respiratory, death.    YAJAIRA [facility]    Zachery Lewis is a 62 year old male who presents to clinic today for the following health issues:  Chief Complaint   Patient presents with     Chest Pain     Increased stress lately. Chest pressure for about a week, when occurs tingling in L hand. Has experienced chest tightening in the past but not the hand tingling.   Concerns with hard spot on L leg below knee.      HPI    Presents to clinic states that he has been having left chest wall pressure mild shortness of breath pain in the left chest wall left upper extremity numbness and weakness down into his hand that comes and goes over the past week.  He is a former smoker.  Patient has a history of hypertension he is currently taking losartan.  Negative history of hyperlipidemia or family history of cardiac disease.  He does have a history of left lower extremity popliteal superficial none obstructing DVT approximately 2 years ago he had a subsequent ultrasound done was negative for DVT.  He is denying any shortness of breath or headache.  Vital signs are stable today.   He also notes that left posterior calf there is a lump that  has been there for several months and has not changed denies pain in this area there is some vasculature surrounding this area that has not changed as well.  He does wear compression stockings on a daily basis he states that without wearing these he does have bilateral lower extremity pain.    He states he has been having pain while sitting in clinic today but not currently at this time.      Review of Systems  Constitutional, HEENT, cardiovascular, pulmonary, gi and gu systems are negative, except as otherwise noted.      Objective    /76 (BP Location: Right arm, Cuff Size: Adult Large)   Pulse 75   Temp 97.8  F (36.6  C) (Tympanic)   Wt 99.4 kg (219 lb 3.2 oz)   SpO2 97%   BMI 30.15 kg/m    Physical Exam   GENERAL: alert and over weight  EYES: Eyes grossly normal to inspection, PERRL and conjunctivae and sclerae normal  HENT: ear canals and TM's normal, nose and mouth without ulcers or lesions  NECK: no adenopathy, no asymmetry, masses, or scars and thyroid normal to palpation  RESP: lungs clear to auscultation - no rales, rhonchi or wheezes  CV: regular rate and rhythm, normal S1 S2, no S3 or S4, no murmur, click or rub, no peripheral edema and peripheral pulses strong  ABDOMEN: soft, nontender, no hepatosplenomegaly, no masses and bowel sounds normal  MS: no gross musculoskeletal defects noted, no edema  SKIN: no suspicious lesions or rashes  NEURO: Normal strength and tone, mentation intact and speech normal  BACK: no CVA tenderness, no paralumbar tenderness    EKG - Reviewed and interpreted by me appears normal, NSR, normal axis, normal intervals, no acute ST/T changes c/w ischemia, no LVH by voltage criteria, unchanged from previous tracings

## 2023-03-21 ASSESSMENT — PATIENT HEALTH QUESTIONNAIRE - PHQ9
10. IF YOU CHECKED OFF ANY PROBLEMS, HOW DIFFICULT HAVE THESE PROBLEMS MADE IT FOR YOU TO DO YOUR WORK, TAKE CARE OF THINGS AT HOME, OR GET ALONG WITH OTHER PEOPLE: SOMEWHAT DIFFICULT
SUM OF ALL RESPONSES TO PHQ QUESTIONS 1-9: 5
SUM OF ALL RESPONSES TO PHQ QUESTIONS 1-9: 5

## 2023-03-21 ASSESSMENT — ENCOUNTER SYMPTOMS
MYALGIAS: 1
HEMATOCHEZIA: 0
CHILLS: 1
SORE THROAT: 0
EYE PAIN: 0
DYSURIA: 0
NAUSEA: 0
HEARTBURN: 1
ARTHRALGIAS: 0
SHORTNESS OF BREATH: 1
HEMATURIA: 0
DIZZINESS: 0
COUGH: 0
PALPITATIONS: 0
PARESTHESIAS: 1
FREQUENCY: 0
CONSTIPATION: 0
NERVOUS/ANXIOUS: 0
HEADACHES: 0
DIARRHEA: 0
ABDOMINAL PAIN: 0
FEVER: 0
WEAKNESS: 1
JOINT SWELLING: 0

## 2023-03-22 ENCOUNTER — OFFICE VISIT (OUTPATIENT)
Dept: FAMILY MEDICINE | Facility: CLINIC | Age: 63
End: 2023-03-22
Payer: COMMERCIAL

## 2023-03-22 VITALS
TEMPERATURE: 97.8 F | SYSTOLIC BLOOD PRESSURE: 134 MMHG | DIASTOLIC BLOOD PRESSURE: 87 MMHG | OXYGEN SATURATION: 97 % | HEART RATE: 76 BPM | BODY MASS INDEX: 29.53 KG/M2 | RESPIRATION RATE: 18 BRPM | WEIGHT: 218 LBS | HEIGHT: 72 IN

## 2023-03-22 DIAGNOSIS — I51.7 ENLARGED RV (RIGHT VENTRICLE): ICD-10-CM

## 2023-03-22 DIAGNOSIS — I51.7 BIATRIAL ENLARGEMENT: ICD-10-CM

## 2023-03-22 DIAGNOSIS — R79.89 ELEVATED TROPONIN: ICD-10-CM

## 2023-03-22 DIAGNOSIS — I10 HYPERTENSION GOAL BP (BLOOD PRESSURE) < 140/90: ICD-10-CM

## 2023-03-22 DIAGNOSIS — D64.9 ANEMIA, UNSPECIFIED TYPE: ICD-10-CM

## 2023-03-22 DIAGNOSIS — Z00.00 ROUTINE GENERAL MEDICAL EXAMINATION AT A HEALTH CARE FACILITY: Primary | ICD-10-CM

## 2023-03-22 LAB
BASOPHILS # BLD AUTO: 0 10E3/UL (ref 0–0.2)
BASOPHILS NFR BLD AUTO: 1 %
EOSINOPHIL # BLD AUTO: 0.2 10E3/UL (ref 0–0.7)
EOSINOPHIL NFR BLD AUTO: 3 %
ERYTHROCYTE [DISTWIDTH] IN BLOOD BY AUTOMATED COUNT: 13 % (ref 10–15)
HCT VFR BLD AUTO: 38.2 % (ref 40–53)
HGB BLD-MCNC: 13.1 G/DL (ref 13.3–17.7)
LYMPHOCYTES # BLD AUTO: 2.3 10E3/UL (ref 0.8–5.3)
LYMPHOCYTES NFR BLD AUTO: 38 %
MCH RBC QN AUTO: 29.7 PG (ref 26.5–33)
MCHC RBC AUTO-ENTMCNC: 34.3 G/DL (ref 31.5–36.5)
MCV RBC AUTO: 87 FL (ref 78–100)
MONOCYTES # BLD AUTO: 0.8 10E3/UL (ref 0–1.3)
MONOCYTES NFR BLD AUTO: 13 %
NEUTROPHILS # BLD AUTO: 2.8 10E3/UL (ref 1.6–8.3)
NEUTROPHILS NFR BLD AUTO: 46 %
PLATELET # BLD AUTO: 229 10E3/UL (ref 150–450)
RBC # BLD AUTO: 4.41 10E6/UL (ref 4.4–5.9)
WBC # BLD AUTO: 6.1 10E3/UL (ref 4–11)

## 2023-03-22 PROCEDURE — 99214 OFFICE O/P EST MOD 30 MIN: CPT | Mod: 25 | Performed by: FAMILY MEDICINE

## 2023-03-22 PROCEDURE — 99396 PREV VISIT EST AGE 40-64: CPT | Performed by: FAMILY MEDICINE

## 2023-03-22 PROCEDURE — 85025 COMPLETE CBC W/AUTO DIFF WBC: CPT | Performed by: FAMILY MEDICINE

## 2023-03-22 PROCEDURE — 36415 COLL VENOUS BLD VENIPUNCTURE: CPT | Performed by: FAMILY MEDICINE

## 2023-03-22 RX ORDER — CYCLOBENZAPRINE HCL 5 MG
5 TABLET ORAL
COMMUNITY

## 2023-03-22 RX ORDER — LOSARTAN POTASSIUM 25 MG/1
TABLET ORAL
Qty: 90 TABLET | Refills: 3 | Status: SHIPPED | OUTPATIENT
Start: 2023-03-22 | End: 2024-04-08

## 2023-03-22 ASSESSMENT — ENCOUNTER SYMPTOMS
DYSURIA: 0
HEMATURIA: 0
SORE THROAT: 0
ABDOMINAL PAIN: 0
JOINT SWELLING: 0
NAUSEA: 0
FEVER: 0
FREQUENCY: 0
COUGH: 0
HEARTBURN: 1
EYE PAIN: 0
SHORTNESS OF BREATH: 1
WEAKNESS: 1
ARTHRALGIAS: 0
PARESTHESIAS: 1
HEMATOCHEZIA: 0
CHILLS: 1
MYALGIAS: 1
PALPITATIONS: 0
DIARRHEA: 0
CONSTIPATION: 0
NERVOUS/ANXIOUS: 0
HEADACHES: 0
DIZZINESS: 0

## 2023-03-22 ASSESSMENT — PAIN SCALES - GENERAL: PAINLEVEL: NO PAIN (0)

## 2023-03-22 NOTE — PATIENT INSTRUCTIONS
1. Follow a healthy diet - reliable information is available at: myplate.gov.    2. Get regular exercise based on your abilities like walking, jogging, biking, swimming and strength training (150 minutes per week).      3. Avoid the sun or otherwise use sun screen to prevent skin cancer.    4. See the dentist and eye doctor regularly.     5. Advanced directive or living will is a good idea. Fill out the form by going to: [email or web address], then have it notarized and send me a copy.     6. If you change your mind about seeing the cardiologist, please call the phone number below.    7. I will contact you with results. If all is well, come back in one year for a physical with fasting labs.    Joshua, it has been a pleasure being your doctor over the years!

## 2023-03-22 NOTE — PROGRESS NOTES
SUBJECTIVE:   CC: Joshua is an 62 year old who presents for preventative health visit.   No flowsheet data found.Patient has been advised of split billing requirements and indicates understanding: Yes  Healthy Habits:     Getting at least 3 servings of Calcium per day:  Yes    Bi-annual eye exam:  Yes    Dental care twice a year:  Yes    Sleep apnea or symptoms of sleep apnea:  None    Diet:  Regular (no restrictions)    Frequency of exercise:  1 day/week    Duration of exercise:  N/A    Taking medications regularly:  Yes    Medication side effects:  Not applicable    PHQ-2 Total Score: 0    Additional concerns today:  Yes    Atypical chest pain, slight trop leak, RVH, atrial enlargement - he presented with non exertional intermittent chest pain of about 2 weeks duration. He has also background left hand numbness likely related to carpal tunnel - see below. Currently he denies chest pain or shortness of breath.  Evaluation and treatment:    Hospitalized 3/15/23 to 3/16/23 - EKG and CXR fine. Trop leak - highest 0.07.    Difficult to say if this anything significant. The discharge summary mentions NSTEMI.   Either way the echo mentions RVH and bi-atrial enlargement - I think he needs cardiology evaluation - I referred him.    Anemia - denies any bleeding. He says they took a lot of blood in the Hospital.  Evaluation and treatment:    Baseline hgb has been around 12 since 2021.   On 12/8/21 we did iron studies, B12, folate which were all fine.   On 1/11/22 occult stool blood was negative.   Anemia of chronic disease?   In the Hospital the hgb was 13.1, then 12.0.   Today hgb is at baseline.   Since his hgb is stable with the above work up, we can continue to monitor without further evaluation.      CBC RESULTS: Recent Labs   Lab Test 03/22/23  1732   WBC 6.1   RBC 4.41   HGB 13.1*   HCT 38.2*   MCV 87   MCH 29.7   MCHC 34.3   RDW 13.0        Left carpal tunnel - present for a number of months. There is tingling  and sometimes weakness at the left thumb and 2rd and 3rd fingers. This is intermittent. He denies neck problems. At his job there is a lot of repetitive hand movements.  Evaluation and treatment:    I offered him ortho referral but he would rather hold off for now.   He can wear brace over the counter prn.    HTN - not checking BP at home. Fairly controlled in clinic.  Evaluation and treatment:    Losartan 25 mg daily - no side effects.   Continue same tx.   In the Hospital 3/15/23 BMP was fine.    BP Readings from Last 6 Encounters:   03/22/23 134/87   03/15/23 129/76   10/20/22 137/76   01/07/22 (!) 155/66   11/03/21 134/60   09/28/21 138/77     Last Comprehensive Metabolic Panel:  Sodium   Date Value Ref Range Status   11/20/2021 139 133 - 144 mmol/L Final   12/03/2020 141 133 - 144 mmol/L Final     Potassium   Date Value Ref Range Status   11/20/2021 3.8 3.4 - 5.3 mmol/L Final   12/03/2020 3.8 3.4 - 5.3 mmol/L Final     Chloride   Date Value Ref Range Status   11/20/2021 107 94 - 109 mmol/L Final   12/03/2020 108 94 - 109 mmol/L Final     Carbon Dioxide   Date Value Ref Range Status   12/03/2020 28 20 - 32 mmol/L Final     Carbon Dioxide (CO2)   Date Value Ref Range Status   11/20/2021 27 20 - 32 mmol/L Final     Anion Gap   Date Value Ref Range Status   11/20/2021 5 3 - 14 mmol/L Final   12/03/2020 5 3 - 14 mmol/L Final     Glucose   Date Value Ref Range Status   11/20/2021 78 70 - 99 mg/dL Final   12/03/2020 79 70 - 99 mg/dL Final     Comment:     Fasting specimen     Urea Nitrogen   Date Value Ref Range Status   11/20/2021 23 7 - 30 mg/dL Final   12/03/2020 26 7 - 30 mg/dL Final     Creatinine   Date Value Ref Range Status   11/20/2021 0.79 0.66 - 1.25 mg/dL Final   12/03/2020 0.86 0.66 - 1.25 mg/dL Final     GFR Estimate   Date Value Ref Range Status   11/20/2021 >90 >60 mL/min/1.73m2 Final     Comment:     As of July 11, 2021, eGFR is calculated by the CKD-EPI creatinine equation, without race adjustment.  eGFR can be influenced by muscle mass, exercise, and diet. The reported eGFR is an estimation only and is only applicable if the renal function is stable.   12/03/2020 >90 >60 mL/min/[1.73_m2] Final     Comment:     Non  GFR Calc  Starting 12/18/2018, serum creatinine based estimated GFR (eGFR) will be   calculated using the Chronic Kidney Disease Epidemiology Collaboration   (CKD-EPI) equation.       Calcium   Date Value Ref Range Status   11/20/2021 8.5 8.5 - 10.1 mg/dL Final   12/03/2020 8.4 (L) 8.5 - 10.1 mg/dL Final     Obesity -    Diet and exercise discussed.   Snores but denies daytime sleepiness.     Body mass index is 29.57 kg/m .    Wt Readings from Last 5 Encounters:   03/22/23 98.9 kg (218 lb)   03/15/23 99.4 kg (219 lb 3.2 oz)   10/20/22 99.9 kg (220 lb 3.2 oz)   01/07/22 104.8 kg (231 lb)   11/03/21 103.4 kg (228 lb)     Varicose veins, previous left leg DVT -    Evaluation and treatment:   U/S 11/19/20 - left politeal veing DVT extends to proximal calf.   Treated with Eliquis.    U/S 10/4/21 negative for DVT   Since unprovoked saw hematology - intermittent prophylaxis was advised with car/plane rides 4 hours or with surgeries.    Scrotal mass -  Evaluation and treatment:    U/S 10/4/21 - indeterminate mass   Follows with urology    Left shoulder rotator cuff tear -    surgery on 12/1/16     Right shoulder    surgery 10/9/14 with good results.    H/O Tobacco abuse - started since age 17. Average 1 ppd. Quit smoking 9/14/16.     Discussed lung CA screen - declines lung CT.    Preventive - declines flu shot, declines covid booster.    Immunization History   Administered Date(s) Administered     COVID-19 Vaccine 12+ (Pfizer 2022) 06/06/2022     COVID-19 Vaccine 12+ (Pfizer) 04/26/2021, 05/17/2021, 12/29/2021     Influenza Vaccine 50-64 or 18-64 w/egg allergy (Flublok) 09/23/2020, 09/28/2021     Influenza Vaccine, 6+MO IM (QUADRIVALENT W/PRESERVATIVES) 09/28/2021     TDAP (Adacel,Boostrix)  2011, 2021     Zoster recombinant adjuvanted (SHINGRIX) 2020, 2020     -STD screen: declines    - Colon CA screen: declines Colonoscopy. cologuard negative 10/19/21 - repeat in 3 years.     - Prostate CA screen: Discussed controversy about screening.     Prostate Specific Antigen Screen   Date Value Ref Range Status   2021 0.76 0.00 - 4.00 ug/L Final     - Advanced Directive: referred previously.    - Lung cancer screen: per HPI    -lipids screen: In the Hospital 3/16/23, similar lipid profile.    Recent Labs   Lab Test 21  1108 20  1453   CHOL 143 155   HDL 42 43   LDL 89 95   TRIG 62 86         SH:    Marital status:   Kids: 3 + 3  Employment: planning to retire at age 63  Exercise:   Tobacco: per HPI  Etoh: occ  Recreational drugs: no  Caffeine:     Today's PHQ-2 Score:   PHQ-2 (  Pfizer) 3/21/2023   Q1: Little interest or pleasure in doing things 0   Q2: Feeling down, depressed or hopeless 0   PHQ-2 Score 0   PHQ-2 Total Score (12-17 Years)- Positive if 3 or more points; Administer PHQ-A if positive -   Q1: Little interest or pleasure in doing things More than half the days   Q2: Feeling down, depressed or hopeless Several days   PHQ-2 Score 3       Social History     Tobacco Use     Smoking status: Former     Packs/day: 0.00     Years: 0.00     Pack years: 0.00     Types: Cigarettes     Quit date: 2016     Years since quittin.5     Smokeless tobacco: Never   Substance Use Topics     Alcohol use: Yes     Comment: occ.         Alcohol Use 3/21/2023   Prescreen: >3 drinks/day or >7 drinks/week? No       Last PSA:   Prostate Specific Antigen Screen   Date Value Ref Range Status   2021 0.76 0.00 - 4.00 ug/L Final       Reviewed orders with patient. Reviewed health maintenance and updated orders accordingly - Yes      Reviewed and updated as needed this visit by clinical staff   Tobacco  Allergies  Meds              Reviewed and updated as needed  this visit by Provider                     Review of Systems   Constitutional: Positive for chills. Negative for fever.   HENT: Positive for hearing loss. Negative for congestion, ear pain and sore throat.    Eyes: Negative for pain and visual disturbance.   Respiratory: Positive for shortness of breath. Negative for cough.    Cardiovascular: Positive for chest pain. Negative for palpitations and peripheral edema.   Gastrointestinal: Positive for heartburn. Negative for abdominal pain, constipation, diarrhea, hematochezia and nausea.   Genitourinary: Positive for impotence. Negative for dysuria, frequency, genital sores, hematuria, penile discharge and urgency.   Musculoskeletal: Positive for myalgias. Negative for arthralgias and joint swelling.   Skin: Positive for rash.   Neurological: Positive for weakness and paresthesias. Negative for dizziness and headaches.   Psychiatric/Behavioral: Positive for mood changes. The patient is not nervous/anxious.          OBJECTIVE:   /87   Pulse 76   Temp 97.8  F (36.6  C) (Tympanic)   Resp 18   Ht 1.829 m (6')   Wt 98.9 kg (218 lb)   SpO2 97%   BMI 29.57 kg/m      Physical Exam    GENERAL: healthy, alert and no distress  EYES: Eyes grossly normal to inspection, PERRL and conjunctivae and sclerae normal  HENT: ear canals and TM's normal, nose and mouth without ulcers or lesions  NECK: no adenopathy, no asymmetry, masses, or scars and thyroid normal to palpation  RESP: lungs clear to auscultation - no rales, rhonchi or wheezes  CV: regular rate and rhythm, normal S1 S2, no S3 or S4, no murmur, click or rub, no peripheral edema and peripheral pulses strong. Varicose veins both legs.  ABDOMEN: soft, nontender, no hepatosplenomegaly, no masses and bowel sounds normal  MS: no gross musculoskeletal defects noted, no edema  SKIN: no suspicious lesions or rashes  NEURO: Normal strength and tone, mentation intact and speech normal  PSYCH: mentation appears normal, affect  normal/bright     ASSESSMENT/PLAN:       COUNSELING:   Reviewed preventive health counseling, as reflected in patient instructions       Regular exercise       Healthy diet/nutrition        He reports that he quit smoking about 6 years ago. His smoking use included cigarettes. He has never used smokeless tobacco.      Assessment and Plan - Decision Making    1. Routine general medical examination at a health care facility    Results of today's exam given to patient verbally along with age appropriate preventive measures. Written instructions reviewed and handed to the patient.      2. Anemia, unspecified type    Per HPI    - CBC with platelets and differential    3. Elevated troponin    Per HPI    - Adult Cardiology Eval  Referral; Future    4. Biatrial enlargement    Per HPI    - Adult Cardiology Eval  Referral; Future    5. Enlarged RV (right ventricle)    Per HPI    - Adult Cardiology Eval  Referral; Future    6. Hypertension goal BP (blood pressure) < 140/90    Per HPI    - losartan (COZAAR) 25 MG tablet; TAKE 1 TABLET (25 MG) BY MOUTH DAILY  Dispense: 90 tablet; Refill: 3      Written instructions given as follows:    Patient Instructions   1. Follow a healthy diet - reliable information is available at: myplate.gov.    2. Get regular exercise based on your abilities like walking, jogging, biking, swimming and strength training (150 minutes per week).      3. Avoid the sun or otherwise use sun screen to prevent skin cancer.    4. See the dentist and eye doctor regularly.     5. Advanced directive or living will is a good idea. Fill out the form by going to: [email or web address], then have it notarized and send me a copy.     6. If you change your mind about seeing the cardiologist, please call the phone number below.    7. I will contact you with results. If all is well, come back in one year for a physical with fasting labs.    Joshua, it has been a pleasure being  your doctor over the years!

## 2023-03-27 NOTE — RESULT ENCOUNTER NOTE
Maurice Lewis,    Your hemoglobin is slightly low but stable compared to previous. If you experience any unusual bleeding, please let us know.    Regards,    Vinny Singh M.D.

## 2023-11-21 ENCOUNTER — TELEPHONE (OUTPATIENT)
Dept: FAMILY MEDICINE | Facility: CLINIC | Age: 63
End: 2023-11-21
Payer: COMMERCIAL

## 2023-11-21 NOTE — TELEPHONE ENCOUNTER
Patient Quality Outreach    Patient is due for the following:       Topic Date Due    Pneumococcal Vaccine (1 - PCV) Never done    Flu Vaccine (1) 09/01/2023    COVID-19 Vaccine (5 - 2023-24 season) 09/01/2023       Next Steps:   Schedule a nurse only visit for immunizations.    Type of outreach:    Sent [employer] message.      Questions for provider review:    None           SEVERO LEON MA

## 2023-12-08 ENCOUNTER — ANCILLARY PROCEDURE (OUTPATIENT)
Dept: GENERAL RADIOLOGY | Facility: CLINIC | Age: 63
End: 2023-12-08
Attending: PHYSICIAN ASSISTANT
Payer: COMMERCIAL

## 2023-12-08 ENCOUNTER — OFFICE VISIT (OUTPATIENT)
Dept: URGENT CARE | Facility: URGENT CARE | Age: 63
End: 2023-12-08
Payer: COMMERCIAL

## 2023-12-08 VITALS
WEIGHT: 207 LBS | RESPIRATION RATE: 18 BRPM | TEMPERATURE: 96.9 F | SYSTOLIC BLOOD PRESSURE: 145 MMHG | OXYGEN SATURATION: 98 % | HEART RATE: 84 BPM | DIASTOLIC BLOOD PRESSURE: 89 MMHG | BODY MASS INDEX: 28.07 KG/M2

## 2023-12-08 DIAGNOSIS — F41.1 ANXIETY STATE: ICD-10-CM

## 2023-12-08 DIAGNOSIS — R10.84 GENERALIZED ABDOMINAL PAIN: Primary | ICD-10-CM

## 2023-12-08 LAB
ALBUMIN SERPL BCG-MCNC: 4.9 G/DL (ref 3.5–5.2)
ALBUMIN UR-MCNC: NEGATIVE MG/DL
ALP SERPL-CCNC: 66 U/L (ref 40–150)
ALT SERPL W P-5'-P-CCNC: 27 U/L (ref 0–70)
ANION GAP SERPL CALCULATED.3IONS-SCNC: 13 MMOL/L (ref 7–15)
APPEARANCE UR: CLEAR
AST SERPL W P-5'-P-CCNC: 26 U/L (ref 0–45)
BASOPHILS # BLD AUTO: 0 10E3/UL (ref 0–0.2)
BASOPHILS NFR BLD AUTO: 0 %
BILIRUB SERPL-MCNC: 0.7 MG/DL
BILIRUB UR QL STRIP: NEGATIVE
BUN SERPL-MCNC: 20.2 MG/DL (ref 8–23)
CALCIUM SERPL-MCNC: 9.6 MG/DL (ref 8.8–10.2)
CHLORIDE SERPL-SCNC: 102 MMOL/L (ref 98–107)
COLOR UR AUTO: YELLOW
CREAT SERPL-MCNC: 0.92 MG/DL (ref 0.67–1.17)
DEPRECATED HCO3 PLAS-SCNC: 23 MMOL/L (ref 22–29)
EGFRCR SERPLBLD CKD-EPI 2021: >90 ML/MIN/1.73M2
EOSINOPHIL # BLD AUTO: 0.1 10E3/UL (ref 0–0.7)
EOSINOPHIL NFR BLD AUTO: 1 %
ERYTHROCYTE [DISTWIDTH] IN BLOOD BY AUTOMATED COUNT: 12.3 % (ref 10–15)
GLUCOSE SERPL-MCNC: 109 MG/DL (ref 70–99)
GLUCOSE UR STRIP-MCNC: NEGATIVE MG/DL
HCT VFR BLD AUTO: 44.4 % (ref 40–53)
HGB BLD-MCNC: 15.2 G/DL (ref 13.3–17.7)
HGB UR QL STRIP: NEGATIVE
IMM GRANULOCYTES # BLD: 0 10E3/UL
IMM GRANULOCYTES NFR BLD: 0 %
KETONES UR STRIP-MCNC: NEGATIVE MG/DL
LEUKOCYTE ESTERASE UR QL STRIP: NEGATIVE
LYMPHOCYTES # BLD AUTO: 2.2 10E3/UL (ref 0.8–5.3)
LYMPHOCYTES NFR BLD AUTO: 27 %
MCH RBC QN AUTO: 29.3 PG (ref 26.5–33)
MCHC RBC AUTO-ENTMCNC: 34.2 G/DL (ref 31.5–36.5)
MCV RBC AUTO: 86 FL (ref 78–100)
MONOCYTES # BLD AUTO: 0.8 10E3/UL (ref 0–1.3)
MONOCYTES NFR BLD AUTO: 10 %
NEUTROPHILS # BLD AUTO: 4.9 10E3/UL (ref 1.6–8.3)
NEUTROPHILS NFR BLD AUTO: 62 %
NITRATE UR QL: NEGATIVE
PH UR STRIP: 6 [PH] (ref 5–7)
PLATELET # BLD AUTO: 257 10E3/UL (ref 150–450)
POTASSIUM SERPL-SCNC: 4.1 MMOL/L (ref 3.4–5.3)
PROT SERPL-MCNC: 7.7 G/DL (ref 6.4–8.3)
RBC # BLD AUTO: 5.19 10E6/UL (ref 4.4–5.9)
SODIUM SERPL-SCNC: 138 MMOL/L (ref 135–145)
SP GR UR STRIP: 1.02 (ref 1–1.03)
UROBILINOGEN UR STRIP-ACNC: 0.2 E.U./DL
WBC # BLD AUTO: 8 10E3/UL (ref 4–11)

## 2023-12-08 PROCEDURE — 99204 OFFICE O/P NEW MOD 45 MIN: CPT | Performed by: PHYSICIAN ASSISTANT

## 2023-12-08 PROCEDURE — 85025 COMPLETE CBC W/AUTO DIFF WBC: CPT | Performed by: PHYSICIAN ASSISTANT

## 2023-12-08 PROCEDURE — 80053 COMPREHEN METABOLIC PANEL: CPT | Performed by: PHYSICIAN ASSISTANT

## 2023-12-08 PROCEDURE — 81003 URINALYSIS AUTO W/O SCOPE: CPT | Performed by: PHYSICIAN ASSISTANT

## 2023-12-08 PROCEDURE — 74019 RADEX ABDOMEN 2 VIEWS: CPT | Mod: TC | Performed by: RADIOLOGY

## 2023-12-08 PROCEDURE — 36415 COLL VENOUS BLD VENIPUNCTURE: CPT | Performed by: PHYSICIAN ASSISTANT

## 2023-12-08 RX ORDER — HYOSCYAMINE SULFATE 0.38 MG/1
0.38 TABLET, EXTENDED RELEASE ORAL EVERY 12 HOURS PRN
Qty: 30 TABLET | Refills: 0 | Status: SHIPPED | OUTPATIENT
Start: 2023-12-08 | End: 2023-12-08

## 2023-12-08 RX ORDER — HYDROXYZINE HYDROCHLORIDE 25 MG/1
25 TABLET, FILM COATED ORAL 3 TIMES DAILY PRN
Qty: 25 TABLET | Refills: 0 | Status: SHIPPED | OUTPATIENT
Start: 2023-12-08 | End: 2023-12-08

## 2023-12-08 RX ORDER — HYOSCYAMINE SULFATE 0.38 MG/1
0.38 TABLET, EXTENDED RELEASE ORAL EVERY 12 HOURS PRN
Qty: 30 TABLET | Refills: 0 | Status: SHIPPED | OUTPATIENT
Start: 2023-12-08 | End: 2024-01-05

## 2023-12-08 RX ORDER — HYDROXYZINE HYDROCHLORIDE 25 MG/1
25 TABLET, FILM COATED ORAL AT BEDTIME
Qty: 25 TABLET | Refills: 0 | Status: SHIPPED | OUTPATIENT
Start: 2023-12-08 | End: 2023-12-08

## 2023-12-08 RX ORDER — HYDROXYZINE HYDROCHLORIDE 25 MG/1
25 TABLET, FILM COATED ORAL AT BEDTIME
Qty: 25 TABLET | Refills: 0 | Status: SHIPPED | OUTPATIENT
Start: 2023-12-08 | End: 2024-01-05

## 2023-12-08 ASSESSMENT — ENCOUNTER SYMPTOMS
NAUSEA: 0
BLOOD IN STOOL: 0
DYSURIA: 0
FEVER: 0
ABDOMINAL PAIN: 1
DIARRHEA: 0
VOMITING: 0

## 2023-12-08 NOTE — PROGRESS NOTES
SUBJECTIVE:   Joshua Gill Sr. is a 63 year old male presenting with a chief complaint of   Chief Complaint   Patient presents with    Urgent Care    Abdominal Pain     Per patient has been having abdominal discomfort, losing weight (208lb this morning with only briefs) , constant trips to the restroom , discomfort after eating, and not feeling well , did send [facility] messages and told to be seen in  for further evaluation.        He is an established patient of [facility].  Patient presents with frequent bowel movements.  6 days ago had diarrhea and has since resolved.  Yesterday 4 Bms that were not diarrhea, but soft.  Two weeks of diffuse abdominal pain.  States eating better and exercising.  Does not feel bloated.  Pressure type pain. No radiation. Supine makes better. Nothing makes worse.  3/10.  Has not had before.  No colonoscopy.  Hx of anxiety.    Patient did communicate with his PCP multiple complaints, some of which included insomnia (newly retired) and weight loss (although he doesn't know how much weight and does not know the time period).  [facility] and has an appointment in about 2 weeks.  He was recommended to go to UC to evaluate the abdominal pain and the other problems with his new PCP.      Treatment:  tums and pepto - no help    Review of Systems   Constitutional:  Negative for fever.   Cardiovascular:  Negative for chest pain.   Gastrointestinal:  Positive for abdominal pain. Negative for blood in stool, diarrhea, nausea and vomiting.   Genitourinary: Negative.  Negative for dysuria.   All other systems reviewed and are negative.      Past Medical History:   Diagnosis Date    Motion sickness     No pertinent past medical history     PONV (postoperative nausea and vomiting)      Family History   Problem Relation Age of Onset    Cancer Mother     Diabetes Mother     Cancer Paternal Grandfather     Thyroid Disease Sister     Diabetes Maternal Uncle      Hypertension No family hx of     Cerebrovascular Disease No family hx of     Glaucoma No family hx of     Macular Degeneration No family hx of      Current Outpatient Medications   Medication Sig Dispense Refill    cyclobenzaprine (FLEXERIL) 5 MG tablet Take 5 mg by mouth      hydrOXYzine HCl (ATARAX) 25 MG tablet Take 1 tablet (25 mg) by mouth at bedtime 25 tablet 0    hyoscyamine ER (LEVBID) 375 mcg 12 hr tablet Take 1 tablet (0.375 mg) by mouth every 12 hours as needed for cramping 30 tablet 0    ibuprofen (ADVIL/MOTRIN) 200 MG capsule Take 800 mg by mouth every 4 hours as needed for fever      losartan (COZAAR) 25 MG tablet TAKE 1 TABLET (25 MG) BY MOUTH DAILY 90 tablet 3    triamcinolone (KENALOG) 0.025 % external ointment Apply topically 2 times daily 15 g 0     Social History     Tobacco Use    Smoking status: Former     Packs/day: 0.00     Years: 0.00     Additional pack years: 0.00     Total pack years: 0.00     Types: Cigarettes     Quit date: 2016     Years since quittin.2    Smokeless tobacco: Never   Substance Use Topics    Alcohol use: Yes     Comment: occ.       OBJECTIVE  BP (!) 145/89   Pulse 84   Temp 96.9  F (36.1  C) (Tympanic)   Resp 18   Wt 93.9 kg (207 lb)   SpO2 98%   BMI 28.07 kg/m      Physical Exam  Vitals and nursing note reviewed.   Constitutional:       Appearance: Normal appearance. He is obese.   HENT:      Head: Normocephalic and atraumatic.   Eyes:      Extraocular Movements: Extraocular movements intact.      Conjunctiva/sclera: Conjunctivae normal.   Cardiovascular:      Rate and Rhythm: Normal rate and regular rhythm.      Pulses: Normal pulses.      Heart sounds: Normal heart sounds.   Pulmonary:      Effort: Pulmonary effort is normal.      Breath sounds: Normal breath sounds.   Abdominal:      General: Abdomen is flat. Bowel sounds are normal.      Palpations: Abdomen is soft.      Tenderness: There is no abdominal tenderness.   Musculoskeletal:      Cervical  back: No muscular tenderness.   Skin:     General: Skin is warm and dry.   Neurological:      Mental Status: He is alert.   Psychiatric:         Mood and Affect: Mood normal.         Behavior: Behavior normal.         Labs:  Results for orders placed or performed in visit on 12/08/23 (from the past 24 hour(s))   XR Abdomen 2 Views    Narrative    EXAM: XR ABDOMEN 2 VIEWS  [facility]  DATE: 12/8/2023    INDICATION:  Generalized abdominal pain  COMPARISON: None.      Impression    IMPRESSION: Negative abdomen. Bowel gas pattern is normal. Nothing for obstruction or free air. No evidence for renal stones.   CBC with platelets and differential    Narrative    The following orders were created for panel order CBC with platelets and differential.  Procedure                               Abnormality         Status                     ---------                               -----------         ------                     CBC with platelets and d...[[id number]]                      Final result                 Please view results for these tests on the individual orders.   CBC with platelets and differential   Result Value Ref Range    WBC Count 8.0 4.0 - 11.0 10e3/uL    RBC Count 5.19 4.40 - 5.90 10e6/uL    Hemoglobin 15.2 13.3 - 17.7 g/dL    Hematocrit 44.4 40.0 - 53.0 %    MCV 86 78 - 100 fL    MCH 29.3 26.5 - 33.0 pg    MCHC 34.2 31.5 - 36.5 g/dL    RDW 12.3 10.0 - 15.0 %    Platelet Count 257 150 - 450 10e3/uL    % Neutrophils 62 %    % Lymphocytes 27 %    % Monocytes 10 %    % Eosinophils 1 %    % Basophils 0 %    % Immature Granulocytes 0 %    Absolute Neutrophils 4.9 1.6 - 8.3 10e3/uL    Absolute Lymphocytes 2.2 0.8 - 5.3 10e3/uL    Absolute Monocytes 0.8 0.0 - 1.3 10e3/uL    Absolute Eosinophils 0.1 0.0 - 0.7 10e3/uL    Absolute Basophils 0.0 0.0 - 0.2 10e3/uL    Absolute Immature Granulocytes 0.0 <=0.4 10e3/uL   UA Macroscopic with reflex to Microscopic and Culture - Lab Collect     Specimen: Urine, Clean Catch   Result Value Ref Range    Color Urine Yellow Colorless, Straw, Light Yellow, Yellow    Appearance Urine Clear Clear    Glucose Urine Negative Negative mg/dL    Bilirubin Urine Negative Negative    Ketones Urine Negative Negative mg/dL    Specific Gravity Urine 1.025 1.003 - 1.035    Blood Urine Negative Negative    pH Urine 6.0 5.0 - 7.0    Protein Albumin Urine Negative Negative mg/dL    Urobilinogen Urine 0.2 0.2, 1.0 E.U./dL    Nitrite Urine Negative Negative    Leukocyte Esterase Urine Negative Negative    Narrative    Microscopic not indicated       X-Ray was done, my findings are: Xrays reviewed by myself and independently interpreted.  Any significant discrepancies with official radiologic read, patient will be notified.    Normal gas patterns.    ASSESSMENT:      ICD-10-CM    1. Generalized abdominal pain  R10.84 XR Abdomen 2 Views     UA Macroscopic with reflex to Microscopic and Culture - Lab Collect     CBC with platelets and differential     Comprehensive metabolic panel (BMP + Alb, Alk Phos, ALT, AST, Total. Bili, TP)     UA Macroscopic with reflex to Microscopic and Culture - Lab Collect     CBC with platelets and differential     Comprehensive metabolic panel (BMP + Alb, Alk Phos, ALT, AST, Total. Bili, TP)     hyoscyamine ER (LEVBID) 375 mcg 12 hr tablet      2. Anxiety state  F41.1 hydrOXYzine HCl (ATARAX) 25 MG tablet     DISCONTINUED: hydrOXYzine HCl (ATARAX) 25 MG tablet           Medical Decision Making:    Differential Diagnosis:  Abdominal Pain: Constipation, GERD/Ulcer, Diverticular Disease, Kidney Stone, Abdominal Wall, and Non Specific    Serious Comorbid Conditions:  Adult:   reviewed    PLAN:  A benign exam. No red flags noted.    Rx for levbid.  Atarax for anxiety/sleep qhs and follow up with PCP.  Cmp pending.  Will call with any abnormalities.    Discussed reasons to seek immediate medical attention.  Additionally if no improvement or worsening in one  week, may follow up with PCP and/or UC.        Followup:    If not improving or if condition worsens, follow up with your Primary Care Provider, If not improving or if conditions worsens over the next 12-24 hours, go to the Emergency Department    There are no Patient Instructions on file for this visit.

## 2023-12-18 ASSESSMENT — ANXIETY QUESTIONNAIRES
4. TROUBLE RELAXING: NEARLY EVERY DAY
GAD7 TOTAL SCORE: 14
2. NOT BEING ABLE TO STOP OR CONTROL WORRYING: NEARLY EVERY DAY
GAD7 TOTAL SCORE: 14
3. WORRYING TOO MUCH ABOUT DIFFERENT THINGS: NEARLY EVERY DAY
7. FEELING AFRAID AS IF SOMETHING AWFUL MIGHT HAPPEN: NOT AT ALL
6. BECOMING EASILY ANNOYED OR IRRITABLE: NOT AT ALL
IF YOU CHECKED OFF ANY PROBLEMS ON THIS QUESTIONNAIRE, HOW DIFFICULT HAVE THESE PROBLEMS MADE IT FOR YOU TO DO YOUR WORK, TAKE CARE OF THINGS AT HOME, OR GET ALONG WITH OTHER PEOPLE: SOMEWHAT DIFFICULT
1. FEELING NERVOUS, ANXIOUS, OR ON EDGE: NEARLY EVERY DAY
5. BEING SO RESTLESS THAT IT IS HARD TO SIT STILL: MORE THAN HALF THE DAYS

## 2023-12-20 ENCOUNTER — OFFICE VISIT (OUTPATIENT)
Dept: FAMILY MEDICINE | Facility: CLINIC | Age: 63
End: 2023-12-20
Payer: COMMERCIAL

## 2023-12-20 VITALS
SYSTOLIC BLOOD PRESSURE: 138 MMHG | TEMPERATURE: 97.7 F | HEIGHT: 71 IN | OXYGEN SATURATION: 97 % | BODY MASS INDEX: 28.92 KG/M2 | RESPIRATION RATE: 18 BRPM | HEART RATE: 99 BPM | DIASTOLIC BLOOD PRESSURE: 80 MMHG | WEIGHT: 206.6 LBS

## 2023-12-20 DIAGNOSIS — R10.9 STOMACH PAIN: ICD-10-CM

## 2023-12-20 DIAGNOSIS — G47.09 OTHER INSOMNIA: ICD-10-CM

## 2023-12-20 DIAGNOSIS — F43.23 ADJUSTMENT DISORDER WITH MIXED ANXIETY AND DEPRESSED MOOD: Primary | ICD-10-CM

## 2023-12-20 PROCEDURE — 36415 COLL VENOUS BLD VENIPUNCTURE: CPT | Performed by: NURSE PRACTITIONER

## 2023-12-20 PROCEDURE — 82306 VITAMIN D 25 HYDROXY: CPT | Performed by: NURSE PRACTITIONER

## 2023-12-20 PROCEDURE — 99215 OFFICE O/P EST HI 40 MIN: CPT | Performed by: NURSE PRACTITIONER

## 2023-12-20 PROCEDURE — 84443 ASSAY THYROID STIM HORMONE: CPT | Performed by: NURSE PRACTITIONER

## 2023-12-20 RX ORDER — FLUOXETINE 10 MG/1
TABLET, FILM COATED ORAL
Qty: 49 TABLET | Refills: 0 | Status: SHIPPED | OUTPATIENT
Start: 2023-12-20 | End: 2024-01-05 | Stop reason: ALTCHOICE

## 2023-12-20 RX ORDER — TRAZODONE HYDROCHLORIDE 50 MG/1
TABLET, FILM COATED ORAL
Qty: 49 TABLET | Refills: 0 | Status: SHIPPED | OUTPATIENT
Start: 2023-12-20 | End: 2024-01-19

## 2023-12-20 ASSESSMENT — ENCOUNTER SYMPTOMS
DYSPHORIC MOOD: 1
FATIGUE: 1
APPETITE CHANGE: 0
VOMITING: 0
ABDOMINAL DISTENTION: 0
CONSTIPATION: 0
NAUSEA: 0
FEVER: 0
UNEXPECTED WEIGHT CHANGE: 0
ABDOMINAL PAIN: 1
HEARTBURN: 1
NERVOUS/ANXIOUS: 1
HEMATOCHEZIA: 0
CONFUSION: 0
SLEEP DISTURBANCE: 1
DIARRHEA: 1

## 2023-12-20 ASSESSMENT — PATIENT HEALTH QUESTIONNAIRE - PHQ9: SUM OF ALL RESPONSES TO PHQ QUESTIONS 1-9: 17

## 2023-12-20 ASSESSMENT — PAIN SCALES - GENERAL: PAINLEVEL: NO PAIN (0)

## 2023-12-20 NOTE — PROGRESS NOTES
Assessment & Plan     Adjustment disorder with mixed anxiety and depressed mood  -Appears to be triggered by his recent MCFP and life changes. Unsure at this point if anxiety/depression is a chronic issue for patient. Recommended we screen for thyroid disease and start medication therapy due to the significant distress patient is experiencing. Discussed we will have to start at low doses of medications and gradually titrate the dose upwards as patient has a history of stomach pain/issues that SSRIs can sometimes exacerbate.   - TSH with free T4 reflex  - Vitamin D Deficiency  - traZODone (DESYREL) 50 MG tablet  Dispense: 49 tablet; Refill: 0  - FLUoxetine (PROZAC) 10 MG tablet  Dispense: 49 tablet; Refill: 0  - [facility] Referral  -close follow up in 2-4 weeks to assess response to medication therapy. Okay for virtual or Evisit.     Other insomnia  -Discussed with patient his body's natural clock is probably still set to wake up at 1am since that is when he has woken up for many years and it will take time for that inner clock to adjust. Additionally there certainly seems to be a behavioral component and I recommended cognitive behavioral therapy to help with with racing thoughts and the self-shaming and guilt patterns of thinking that increase anxiety and reduce his ability to sleep. Recommended establishing with [facility] with possible plans for longer term therapy as well.   -Recommend trying meditation before bed such as the Calm Luisana or Headspace Luisana to help lessen anxiety around bed time.   - traZODone (DESYREL) 50 MG tablet  Dispense: 49 tablet; Refill: 0    Stomach pain  -Differentials considered include GERD, stress response, or IBS. I think he could definitely have a degree of irritable bowel syndrome as symptoms are worse with stress/anxiety and are relieved when he is more calm. Recommend trial of PPI and enteric coated peppermint oil.   - omeprazole (PRILOSEC) 20 MG DR capsule   "Dispense: 30 capsule; Refill: 0  - Peppermint Oil 90 MG CPCR      Ordering of each unique test  Prescription drug management  I spent a total of 50 minutes on the day of the visit.   Time spent by me doing chart review, history and exam, documentation and further activities per the note       BMI:   Estimated body mass index is 28.81 kg/m  as calculated from the following:    Height as of this encounter: 1.803 m (5' 11\").    Weight as of this encounter: 93.7 kg (206 lb 9.6 oz).       Depression Screening Follow Up        12/20/2023    10:41 AM   PHQ   PHQ-9 Total Score 17   Q9: Thoughts of better off dead/self-harm past 2 weeks Not at all         12/18/2023     9:11 AM   ARJUN-7 SCORE   Total Score 14 (moderate anxiety)   Total Score 14           Follow Up Actions Taken  Crisis resource information provided in After Visit Summary  Patient has experienced a recent significant life event.  Patient counseled, no additional follow up at this time.  Mental Health Referral placed  Follow up recommended: 2-4 weeks  Started patient on anti-depressant.         [facility]A Chavis [facility]    Zachery Lewis is a 63 year old, presenting for the following health issues:  Insomnia, Depression, and Abdominal Pain        12/20/2023    10:35 AM   Additional Questions   Roomed by Queenie WOLFF   Accompanied by Wife- Barby     Has tried melatonin- not helping- has been taking hydroxyzine for anxiety and sleep- not sleeping, has also tried magnesium for sleep as well- made his stomach upset    Pt has retired soon  Patient comes to clinic today with his wife with concerns for trouble falling asleep at night. This has been ongoing since he retired December 1st. He used to work 7 days a week and would have to wake up at 1am. Now he wakes up at 1am even though he doesn't want to, then starts worrying and feeling anxious about not being able to fall asleep. It has progressed to the point that he now starts " "stressing out before going to bed because he is anticipating waking up and not being able to sleep. He has been keeping a journal of his symptoms and treatments he's tried. He has tried magnesium supplements, melatonin, and melatonin baths. He tried a muscle relaxer once as well, but so far none of these have helped him back to sleep.     He is also having left sided abdominal pain that gets worse when he is feeling stressed and anxious. He had diarrhea on December 2nd but that is mostly resolved. He states his stomach discomfort gets better when he sits in his \"stress chair,\" which is a recliner his wife uses to help relax. He is also having some stomach pain after eating and loose stools in the morning. He hasn't noticed if having a bowel movement makes his stomach pain better. He is also having some belching and indigestion. He went to urgent care and was prescribed hydroxyzine and hyoscyamine but he feels like these medications haven't helped.     He is very distressed by the inability to sleep and how tired he has been feeling. He makes frequent apologies to wife as he worries he hasn't been able to contribute as much to the household. His wife is worried as he has made statements such as \"If I can't sleep I don't know if I want to live like this.\"       History of Present Illness       Mental Health Follow-up:  Patient presents to follow-up on Depression & Anxiety.Patient's depression since last visit has been:  No change  The patient is having other symptoms associated with depression.  Patient's anxiety since last visit has been:  No change  The patient is having other symptoms associated with anxiety.  Any significant life events: other  Patient is not feeling anxious or having panic attacks.  Patient has no concerns about alcohol or drug use.    Reason for visit:  Stomach upset and cant sleep anxiety  Symptom onset:  More than a month  Symptoms include:  Update stomach cant sleep change in life  Symptom " "intensity:  Moderate  Symptom progression:  Worsening  Had these symptoms before:  Yes  Has tried/received treatment for these symptoms:  Yes  Previous treatment was successful:  No  What makes it worse:  Lack of good sleep  What makes it better:  Laying down tried so may things wrote notes    He eats 2-3 servings of fruits and vegetables daily.He consumes 0 sweetened beverage(s) daily.He exercises with enough effort to increase his heart rate 30 to 60 minutes per day.  He exercises with enough effort to increase his heart rate 5 days per week.   He is taking medications regularly.       Review of Systems   Constitutional:  Positive for fatigue. Negative for appetite change, fever and unexpected weight change.   Gastrointestinal:  Positive for abdominal pain, diarrhea and heartburn. Negative for abdominal distention, constipation, hematochezia, nausea and vomiting.   Skin:  Negative for rash.   Psychiatric/Behavioral:  Positive for dysphoric mood, mood changes and sleep disturbance. Negative for confusion and suicidal ideas. The patient is nervous/anxious.    All other systems reviewed and are negative.           Objective    /80   Pulse 99   Temp 97.7  F (36.5  C) (Tympanic)   Resp 18   Ht 1.803 m (5' 11\")   Wt 93.7 kg (206 lb 9.6 oz)   SpO2 97%   BMI 28.81 kg/m    Body mass index is 28.81 kg/m .  Physical Exam  Constitutional:       Appearance: Normal appearance. He is not ill-appearing.   HENT:      Right Ear: Tympanic membrane, ear canal and external ear normal.      Left Ear: Tympanic membrane, ear canal and external ear normal.      Nose: Nose normal.      Mouth/Throat:      Mouth: Mucous membranes are moist.      Pharynx: Oropharynx is clear. No oropharyngeal exudate.   Eyes:      Extraocular Movements: Extraocular movements intact.      Pupils: Pupils are equal, round, and reactive to light.   Cardiovascular:      Rate and Rhythm: Normal rate and regular rhythm.      Pulses: Normal pulses.      " Heart sounds: Normal heart sounds. No murmur heard.     No friction rub. No gallop.   Pulmonary:      Effort: Pulmonary effort is normal.      Breath sounds: Normal breath sounds. No wheezing, rhonchi or rales.   Abdominal:      General: Abdomen is flat. Bowel sounds are normal.      Palpations: Abdomen is soft.      Tenderness: There is abdominal tenderness in the left upper quadrant. There is no guarding or rebound.      Hernia: No hernia is present.   Musculoskeletal:         General: Normal range of motion.      Cervical back: Normal range of motion and neck supple.   Lymphadenopathy:      Cervical: No cervical adenopathy.   Skin:     General: Skin is warm and dry.   Neurological:      General: No focal deficit present.      Mental Status: He is alert and oriented to person, place, and time.   Psychiatric:         Attention and Perception: Attention and perception normal.         Mood and Affect: Mood is anxious.         Speech: Speech normal.         Behavior: Behavior normal.         Thought Content: Thought content normal.         Judgment: Judgment normal.          Office Visit on 12/08/2023   Component Date Value Ref Range Status     Color Urine 12/08/2023 Yellow  Colorless, Straw, Light Yellow, Yellow Final     Appearance Urine 12/08/2023 Clear  Clear Final     Glucose Urine 12/08/2023 Negative  Negative mg/dL Final     Bilirubin Urine 12/08/2023 Negative  Negative Final     Ketones Urine 12/08/2023 Negative  Negative mg/dL Final     Specific Gravity Urine 12/08/2023 1.025  1.003 - 1.035 Final     Blood Urine 12/08/2023 Negative  Negative Final     pH Urine 12/08/2023 6.0  5.0 - 7.0 Final     Protein Albumin Urine 12/08/2023 Negative  Negative mg/dL Final     Urobilinogen Urine 12/08/2023 0.2  0.2, 1.0 E.U./dL Final     Nitrite Urine 12/08/2023 Negative  Negative Final     Leukocyte Esterase Urine 12/08/2023 Negative  Negative Final     Sodium 12/08/2023 138  135 - 145 mmol/L Final    Reference intervals  for this test were updated on 09/26/2023 to more accurately reflect our healthy population. There may be differences in the flagging of prior results with similar values performed with this method. Interpretation of those prior results can be made in the context of the updated reference intervals.      Potassium 12/08/2023 4.1  3.4 - 5.3 mmol/L Final     Carbon Dioxide (CO2) 12/08/2023 23  22 - 29 mmol/L Final     Anion Gap 12/08/2023 13  7 - 15 mmol/L Final     Urea Nitrogen 12/08/2023 20.2  8.0 - 23.0 mg/dL Final     Creatinine 12/08/2023 0.92  0.67 - 1.17 mg/dL Final     GFR Estimate 12/08/2023 >90  >60 mL/min/1.73m2 Final     Calcium 12/08/2023 9.6  8.8 - 10.2 mg/dL Final     Chloride 12/08/2023 102  98 - 107 mmol/L Final     Glucose 12/08/2023 109 (H)  70 - 99 mg/dL Final     Alkaline Phosphatase 12/08/2023 66  40 - 150 U/L Final    Reference intervals for this test were updated on 11/14/2023 to more accurately reflect our healthy population. There may be differences in the flagging of prior results with similar values performed with this method. Interpretation of those prior results can be made in the context of the updated reference intervals.     AST 12/08/2023 26  0 - 45 U/L Final    Reference intervals for this test were updated on 6/12/2023 to more accurately reflect our healthy population. There may be differences in the flagging of prior results with similar values performed with this method. Interpretation of those prior results can be made in the context of the updated reference intervals.     ALT 12/08/2023 27  0 - 70 U/L Final    Reference intervals for this test were updated on 6/12/2023 to more accurately reflect our healthy population. There may be differences in the flagging of prior results with similar values performed with this method. Interpretation of those prior results can be made in the context of the updated reference intervals.       Protein Total 12/08/2023 7.7  6.4 - 8.3 g/dL Final      Albumin 12/08/2023 4.9  3.5 - 5.2 g/dL Final     Bilirubin Total 12/08/2023 0.7  <=1.2 mg/dL Final     WBC Count 12/08/2023 8.0  4.0 - 11.0 10e3/uL Final     RBC Count 12/08/2023 5.19  4.40 - 5.90 10e6/uL Final     Hemoglobin 12/08/2023 15.2  13.3 - 17.7 g/dL Final     Hematocrit 12/08/2023 44.4  40.0 - 53.0 % Final     MCV 12/08/2023 86  78 - 100 fL Final     MCH 12/08/2023 29.3  26.5 - 33.0 pg Final     MCHC 12/08/2023 34.2  31.5 - 36.5 g/dL Final     RDW 12/08/2023 12.3  10.0 - 15.0 % Final     Platelet Count 12/08/2023 257  150 - 450 10e3/uL Final     % Neutrophils 12/08/2023 62  % Final     % Lymphocytes 12/08/2023 27  % Final     % Monocytes 12/08/2023 10  % Final     % Eosinophils 12/08/2023 1  % Final     % Basophils 12/08/2023 0  % Final     % Immature Granulocytes 12/08/2023 0  % Final     Absolute Neutrophils 12/08/2023 4.9  1.6 - 8.3 10e3/uL Final     Absolute Lymphocytes 12/08/2023 2.2  0.8 - 5.3 10e3/uL Final     Absolute Monocytes 12/08/2023 0.8  0.0 - 1.3 10e3/uL Final     Absolute Eosinophils 12/08/2023 0.1  0.0 - 0.7 10e3/uL Final     Absolute Basophils 12/08/2023 0.0  0.0 - 0.2 10e3/uL Final     Absolute Immature Granulocytes 12/08/2023 0.0  <=0.4 10e3/uL Final     No results found for this or any previous visit (from the past 24 hour(s)).

## 2023-12-20 NOTE — PATIENT INSTRUCTIONS
SHANNON Nava, [facility]  Behavioral Health Clinician  [facility]     Two says to schedule:  1)  - Ask to schedule a NEW appoinment with dany Nava [facility]  2) Scheduling line - Call [phone] and request a NEW appoinment with dany Nava [facility] at the [facility]

## 2023-12-21 LAB
TSH SERPL DL<=0.005 MIU/L-ACNC: 3.69 UIU/ML (ref 0.3–4.2)
VIT D+METAB SERPL-MCNC: 24 NG/ML (ref 20–50)

## 2024-01-02 ASSESSMENT — ANXIETY QUESTIONNAIRES
2. NOT BEING ABLE TO STOP OR CONTROL WORRYING: NEARLY EVERY DAY
3. WORRYING TOO MUCH ABOUT DIFFERENT THINGS: NEARLY EVERY DAY
7. FEELING AFRAID AS IF SOMETHING AWFUL MIGHT HAPPEN: NOT AT ALL
6. BECOMING EASILY ANNOYED OR IRRITABLE: NEARLY EVERY DAY
4. TROUBLE RELAXING: NEARLY EVERY DAY
GAD7 TOTAL SCORE: 18
1. FEELING NERVOUS, ANXIOUS, OR ON EDGE: NEARLY EVERY DAY
5. BEING SO RESTLESS THAT IT IS HARD TO SIT STILL: NEARLY EVERY DAY

## 2024-01-04 ENCOUNTER — TELEPHONE (OUTPATIENT)
Dept: FAMILY MEDICINE | Facility: CLINIC | Age: 64
End: 2024-01-04
Payer: COMMERCIAL

## 2024-01-04 ENCOUNTER — VIRTUAL VISIT (OUTPATIENT)
Dept: BEHAVIORAL HEALTH | Facility: CLINIC | Age: 64
End: 2024-01-04
Payer: COMMERCIAL

## 2024-01-04 ENCOUNTER — MYC MEDICAL ADVICE (OUTPATIENT)
Dept: FAMILY MEDICINE | Facility: CLINIC | Age: 64
End: 2024-01-04
Payer: COMMERCIAL

## 2024-01-04 DIAGNOSIS — F43.23 ADJUSTMENT DISORDER WITH MIXED ANXIETY AND DEPRESSED MOOD: Primary | ICD-10-CM

## 2024-01-04 PROCEDURE — 90791 PSYCH DIAGNOSTIC EVALUATION: CPT | Mod: 95

## 2024-01-04 ASSESSMENT — ANXIETY QUESTIONNAIRES
4. TROUBLE RELAXING: SEVERAL DAYS
4. TROUBLE RELAXING: NEARLY EVERY DAY
GAD7 TOTAL SCORE: 8
2. NOT BEING ABLE TO STOP OR CONTROL WORRYING: SEVERAL DAYS
7. FEELING AFRAID AS IF SOMETHING AWFUL MIGHT HAPPEN: NOT AT ALL
5. BEING SO RESTLESS THAT IT IS HARD TO SIT STILL: NEARLY EVERY DAY
IF YOU CHECKED OFF ANY PROBLEMS ON THIS QUESTIONNAIRE, HOW DIFFICULT HAVE THESE PROBLEMS MADE IT FOR YOU TO DO YOUR WORK, TAKE CARE OF THINGS AT HOME, OR GET ALONG WITH OTHER PEOPLE: SOMEWHAT DIFFICULT
GAD7 TOTAL SCORE: 18
5. BEING SO RESTLESS THAT IT IS HARD TO SIT STILL: SEVERAL DAYS
GAD7 TOTAL SCORE: 18
7. FEELING AFRAID AS IF SOMETHING AWFUL MIGHT HAPPEN: NOT AT ALL
1. FEELING NERVOUS, ANXIOUS, OR ON EDGE: NEARLY EVERY DAY
3. WORRYING TOO MUCH ABOUT DIFFERENT THINGS: SEVERAL DAYS
GAD7 TOTAL SCORE: 8
6. BECOMING EASILY ANNOYED OR IRRITABLE: NEARLY EVERY DAY
3. WORRYING TOO MUCH ABOUT DIFFERENT THINGS: NEARLY EVERY DAY
2. NOT BEING ABLE TO STOP OR CONTROL WORRYING: NEARLY EVERY DAY
1. FEELING NERVOUS, ANXIOUS, OR ON EDGE: NEARLY EVERY DAY
7. FEELING AFRAID AS IF SOMETHING AWFUL MIGHT HAPPEN: NOT AT ALL
8. IF YOU CHECKED OFF ANY PROBLEMS, HOW DIFFICULT HAVE THESE MADE IT FOR YOU TO DO YOUR WORK, TAKE CARE OF THINGS AT HOME, OR GET ALONG WITH OTHER PEOPLE?: SOMEWHAT DIFFICULT
GAD7 TOTAL SCORE: 8
GAD7 TOTAL SCORE: 18
6. BECOMING EASILY ANNOYED OR IRRITABLE: SEVERAL DAYS
7. FEELING AFRAID AS IF SOMETHING AWFUL MIGHT HAPPEN: NOT AT ALL

## 2024-01-04 ASSESSMENT — PATIENT HEALTH QUESTIONNAIRE - PHQ9
10. IF YOU CHECKED OFF ANY PROBLEMS, HOW DIFFICULT HAVE THESE PROBLEMS MADE IT FOR YOU TO DO YOUR WORK, TAKE CARE OF THINGS AT HOME, OR GET ALONG WITH OTHER PEOPLE: SOMEWHAT DIFFICULT
SUM OF ALL RESPONSES TO PHQ QUESTIONS 1-9: 8

## 2024-01-04 ASSESSMENT — COLUMBIA-SUICIDE SEVERITY RATING SCALE - C-SSRS
2. HAVE YOU ACTUALLY HAD ANY THOUGHTS OF KILLING YOURSELF?: NO
6. HAVE YOU EVER DONE ANYTHING, STARTED TO DO ANYTHING, OR PREPARED TO DO ANYTHING TO END YOUR LIFE?: NO
1. IN THE PAST MONTH, HAVE YOU WISHED YOU WERE DEAD OR WISHED YOU COULD GO TO SLEEP AND NOT WAKE UP?: NO
1. HAVE YOU WISHED YOU WERE DEAD OR WISHED YOU COULD GO TO SLEEP AND NOT WAKE UP?: YES
TOTAL  NUMBER OF INTERRUPTED ATTEMPTS LIFETIME: NO
ATTEMPT LIFETIME: NO

## 2024-01-04 NOTE — PROGRESS NOTES
"[facility] Primary Care: Integrated Behavioral Health      PATIENT'S NAME: Joshua Gill .  PREFERRED NAME: Joshua  PRONOUNS: he/him  MRN: [id number]  : 1960  ADDRESS: [address] [facility] [address] [address]  ACCT. NUMBER:  [id number]  DATE OF SERVICE: 24  START TIME: 12:45P  END TIME: 1:20P  PREFERRED PHONE: [phone]  May we leave a program related message: Yes  EMERGENCY CONTACT: was not obtained .  SERVICE MODALITY:  Video Visit:      Provider verified identity through the following two step process.  Patient provided:  Patient  and Patient address    Telemedicine Visit: The patient's condition can be safely assessed and treated via synchronous audio and visual telemedicine encounter.      Reason for Telemedicine Visit: Services only offered telehealth    Originating Site (Patient Location): Patient's home    Distant Site (Provider Location): Provider Remote Setting- Home Office    Consent:  The patient/guardian has verbally consented to: the potential risks and benefits of telemedicine (video visit) versus in person care; bill my insurance or make self-payment for services provided; and responsibility for payment of non-covered services.     Patient would like the video invitation sent by:  My Chart    Mode of Communication:  Video Conference via Amwell    Distant Location (Provider):  Off-site    As the provider I attest to compliance with applicable laws and regulations related to telemedicine.    UNIVERSAL ADULT Mental Health DIAGNOSTIC ASSESSMENT    Identifying Information:  Patient is a 63 year old,   individual.  Patient was referred for an assessment by self.  Patient attended the session alone.    Chief Complaint:   The reason for seeking services at this time is: \"trouble staying asleep and anxiety and no drive\".  The problem(s) began 11/10/23. Pt reported taking three months vacation and went back to work for a month and then retired " "December 1st, 2023. Pt adds, his problem was he worked 7 days a week for decades, \"always knew what I was doing, always busy.\" When pt was on vacation \"it was fine, I was able to stay busy.\" Then it got colder, couldn't be outdoors, busy cleaning the house, then started \"running out of stuff to do\" can't go ice fishing every day. Pt would get up normally 130-2A every morning. Went to urgent care after many interventions for trying to improve sleep on December 8th, 2023. Sleeping now, on sleeping pills (Trazadone), but it was difficult to sleep for a long while.     Patient has attempted to resolve these concerns in the past through psychotherapy many years ago through [facility] .    Social/Family History:  Patient reported they grew up in [place], [place].  They were raised by biological mother; stepfather  .  Parents .     The patient describes their cultural background as .  Cultural influences and impact on patient's life structure, values, norms, and healthcare: none. These factors will be addressed in the Preliminary Treatment plan. Patient identified their preferred language to be English. Patient reported they does not need the assistance of an  or other support involved in therapy.     Patient reported had no significant delays in developmental tasks.   Patient's highest education level was some high school but no degree  .  Patient identified the following learning problems: none reported.  Modifications will not be used to assist communication in therapy.  Patient reports they are  able to understand written materials.    Patient reported the following relationship history: first marriage was 13 years.  Patient's current relationship status is  for 19 years.   Patient identified their sexual orientation as heterosexual.  Patient reported having 6 child(fannie) from 31-44 years range. Daughter completed suicide at 18 years old on September 10th/11th, 2001. Patient identified " partner; siblings; friends; spouse as part of their support system.  Patient identified the quality of these relationships as good,  .      Patient's current living/housing situation involves staying in own home/apartment.  The immediate members of family and household include mariposa pardo, 62,wife and they report that housing is stable.    Patient is currently retired.  Pt worked 30 years at [employer]. Patient reports their finances are obtained through other. Patient does not identify finances as a current stressor.      Patient reported that they have been involved with the legal system.  dwi in the 80s. Patient does not report being under probation/ parole/ jurisdiction.     Patient's Strengths and Limitations:  Patient identified the following strengths or resources that will help them succeed in treatment: commitment to health and well being, friends / good social support, family support, insight, intelligence, motivation, and strong social skills. Things that may interfere with the patient's success in treatment include: none identified.     Assessments:  The following assessments were completed by patient for this visit:    PHQ9:       3/21/2023     5:58 PM 12/20/2023    10:41 AM 1/4/2024    12:22 PM   PHQ-9 SCORE   PHQ-9 Total Score MyChart 5 (Mild depression)  8 (Mild depression)   PHQ-9 Total Score 5 17 8     GAD7:       12/18/2023     9:11 AM 1/2/2024     9:59 AM 1/4/2024    12:23 PM   ARJUN-7 SCORE   Total Score 14 (moderate anxiety)  18 (severe anxiety)   Total Score 14 18 18     CAGE-AID:       1/2/2024    10:06 AM 1/4/2024    12:23 PM   CAGE-AID Total Score   Total Score 0 0   Total Score MyChart  0 (A total score of 2 or greater is considered clinically significant)     PROMIS 10-Global Health (only subscores and total score):       1/2/2024    10:03 AM 1/4/2024    12:23 PM   PROMIS-10 Scores Only   Global Mental Health Score 12 12   Global Physical Health Score 16 16   PROMIS TOTAL - SUBSCORES 28 28      [place] Suicide Severity Rating Scale (Lifetime/Recent)      1/4/2024     1:08 PM   [place] Suicide Severity Rating (Lifetime/Recent)   Q1 Wish to be Dead (Lifetime) Y   1. Wish to be Dead (Past 1 Month) N   Q2 Non-Specific Active Suicidal Thoughts (Lifetime) N   Actual Attempt (Lifetime) N   Has subject engaged in non-suicidal self-injurious behavior? (Lifetime) N   Interrupted Attempts (Lifetime) N   Preparatory Acts or Behavior (Lifetime) N   Calculated C-SSRS Risk Score (Lifetime/Recent) No Risk Indicated     Personal and Family Medical History:  Patient does not report a family history of mental health concerns.  Patient reports family history includes Cancer in his mother and paternal grandfather; Diabetes in his maternal uncle and mother; Thyroid Disease in his sister..     Patient does not report Mental Health Diagnosis or Treatment.      Patient has had a physical exam to rule out medical causes for current symptoms.  Date of last physical exam was within the past year. Symptoms have developed since last physical exam and client was encouraged to follow up with PCP.  . The patient has a [facility] Provider, who is named Nini Reza..  Patient reports no current medical and/or dental concerns.  Patient denies any issues with pain..   There are not significant appetite / nutritional concerns / weight changes.   Patient does not report a history of head injury / trauma / cognitive impairment.      Current Outpatient Medications   Medication    cyclobenzaprine (FLEXERIL) 5 MG tablet    FLUoxetine (PROZAC) 10 MG tablet    hydrOXYzine HCl (ATARAX) 25 MG tablet    hyoscyamine ER (LEVBID) 375 mcg 12 hr tablet    losartan (COZAAR) 25 MG tablet    omeprazole (PRILOSEC) 20 MG DR capsule    Peppermint Oil 90 MG CPCR    traZODone (DESYREL) 50 MG tablet    triamcinolone (KENALOG) 0.025 % external ointment     No current facility-administered medications for this visit.      Medication  Adherence:  Patient reports taking.    Patient Allergies:  No Known Allergies    Medical History:    Past Medical History:   Diagnosis Date    Motion sickness     No pertinent past medical history     PONV (postoperative nausea and vomiting)      Current Mental Status Exam:   Appearance:  UTD video wasn't working    Eye Contact:  UTD video wasn't working   Psychomotor:  UTD video wasn't working       Gait / station:  UTD video wasn't working  Attitude / Demeanor: Cooperative  Interested Friendly Pleasant Attentive  Speech      Rate / Production: Normal/ Responsive      Volume:  Normal  volume      Language:  intact  Mood:   Anxious  Sad  Dysphoric Anhedonia Grieving (ambiguous loss in detention)  Affect:   UTD video wasn't working    Thought Content: Clear   Thought Process: Coherent  Logical       Associations: No loosening of associations  Insight:   Good  and Intellectual Insight  Judgment:  Intact   Orientation:  All  Attention/concentration: Good    Substance Use:   Patient did not report a family history of substance use concerns; see medical history section for details.  Patient has not received chemical dependency treatment in the past.  Patient has not ever been to detox.  Patient is not currently receiving any chemical dependency treatment.       Substance History of use Age of first use Date of last use     Pattern and duration of use (include amounts and frequency)   Alcohol currently use   not sure 12/01/24 Since retiring 12/8 only had one beer on New Year's Day   Cannabis   used in the past not sure 12/01/93    Amphetamines   used in the past   12/01/93    Cocaine/crack    used in the past not sure  12/01/93     Hallucinogens used in the past   not sure  12/01/93     Inhalants used in the past   not sure      Heroin never used            Other Opiates used in the past not sure 12/01/14    Benzodiazepine   never used        Barbiturates never used        Over the counter meds never used        Caffeine  currently use not sure      Nicotine  used in the past not sure 12/01/22 Smoking cigarettes past   Other substances not listed above:  Identify:  never used          Patient reported the following problems as a result of their substance use: DUI.    Substance Use: No symptoms    Based on the negative CAGE score and clinical interview there  are not indications of drug or alcohol abuse.    Significant Losses / Trauma / Abuse / Neglect Issues:   Patient did not  serve in the .  There are indications or report of significant loss, trauma, abuse or neglect issues related to: are indications or report of significant loss, trauma, abuse or neglect issues related to dtr completed suicide at age 19 on September 11th, 2001.   Concerns for possible neglect are not present.     Safety Assessment:   Patient denies current homicidal ideation and behaviors.  Patient denies current self-injurious ideation and behaviors.    Patient denied risk behaviors associated with substance use.   Patient denies any high risk behaviors associated with mental health symptoms.  Patient reports the following current concerns for their personal safety: None.  Patient reports there are firearms in the house.     yes, they are secured.     History of Safety Concerns:  Patient denied a history of homicidal ideation.     Patient denied a history of personal safety concerns.    Patient denied a history of assaultive behaviors.    Patient denied a history of sexual assault behaviors.     Patient denied a history of risk behaviors associated with substance use.  Patient denies any history of high risk behaviors associated with mental health symptoms.  Patient reports the following protective factors: dedication to family or friends; safe and stable environment    Risk Plan:  See Recommendations for Safety and Risk Management Plan    Review of Symptoms per patient report:   Depression: Change in sleep, Lack of interest, Change in energy level,  Difficulties concentrating, and Feeling sad, down, or depressed  Brittani:  No Symptoms  Psychosis: No Symptoms  Anxiety: Excessive worry, Nervousness, Physical complaints, such as headaches, stomachaches, muscle tension, Sleep disturbance, and Poor concentration  Panic:  No symptoms  Post Traumatic Stress Disorder:  No Symptoms   Eating Disorder: No Symptoms  ADD / ADHD:  No symptoms  Conduct Disorder: No symptoms  Autism Spectrum Disorder: No symptoms  Obsessive Compulsive Disorder: No Symptoms    Patient reports the following compulsive behaviors and treatment history:  none .      Diagnostic Criteria:   Adjustment Disorder  A. The development of emotional or behavioral symptoms in response to an identifiable stressor(s) occurring within 3 months of the onset of the stressor(s)  B. These symptoms or behaviors are clinically significant, as evidenced by one or both of the following:       - Marked distress that is out of proportion to the severity/intensity of the stressor (with consideration for external context & culture)       - Significant impairment in social, occupational, or other important areas of functioning  C. The stress-related disturbance does not meet criteria for another disorder & is not not an exacerbation of another mental disorder  D. The symptoms do not represent normal bereavement  E. Once the stressor or its consequences have terminated, the symptoms do not persist for more than an additional 6 months       * Adjustment Disorder with Mixed Anxiety and Depressed Mood: The predominant manifestation is a combination of depression and anxiety    Functional Status:  Patient reports the following functional impairments:  organization, relationship(s), self-care, and social interactions.     Nonprogrammatic care:  Patient is requesting basic services to address current mental health concerns.    Clinical Summary:  1. Psychosocial, Cultural and Contextual Factors: Pt recently retired just over a month  ago from working three decades daily, at times 12 hour shifts. Pt is struggling with motivation and productivity in filling his days and sense of purpose. Pt feels he has an adequate support system in his life through spouse and adult children. .  2. Principal DSM5 Diagnoses  (Sustained by DSM5 Criteria Listed Above):   Adjustment Disorders  309.28 (F43.23) With mixed anxiety and depressed mood.  5. Prognosis: Expect Improvement and Relieve Acute Symptoms.  6. Likely consequences of symptoms if not treated: higher level of care will be considered.  7. Client strengths include:  goal-focused, good listener, has a previous history of therapy, insightful, intelligent, motivated, open to learning, open to suggestions / feedback, support of family, friends and providers, supportive, wants to learn, willing to ask questions, willing to relate to others, and work history .     Recommendations:     1. Plan for Safety and Risk Management:   Safety and Risk: Recommended that patient call 911 or go to the local ED should there be a change in any of these risk factors..          Report to child / adult protection services was NA.     2. Patient's identified no cultural influences currently impacting pt MH presentation.    3. Initial Treatment will focus on:    Adjustment Difficulties related to: long-term  Grief / Loss - long-term .     4. Resources/Service Plan:    services are not indicated.   Modifications to assist communication are not indicated.   Additional disability accommodations are not indicated.      5. Collaboration: DA to PCP     6.  Referrals: [facility] only     Clinical Substantiation/medical necessity for the above recommendations:  After therapeutic assessment, intervention and referral consideration by clinician, the patient's circumstances and mental state were appropriate for outpatient/community management. It is the recommendation of this clinician that pt begin therapy with a [facility] for further  mental health stabilization and continue to determine clinical need with future monitoring and intervention. At this time the pt is not presenting as an acute risk to self or others due to the following factors: multiple identified protective factors, denies SI/SIB/HI/AVH/SARA, identifies reasons to live, has never been to the ED or inpatient for mental health related issues. Pt presents with forward thinking and willingness to engage and participate in future interventions. Pt was agreeable to this recommendation. .    7. SARA:  not clinically indicated currently.    8. Records:   These were reviewed at time of assessment. Information in this assessment was obtained from the medical record and provided by patient who is a good historian.  Patient will have open access to their mental health medical record    9.   Interactive Complexity: No    10. Safety Plan:  Patient denied any current/recent/lifetime history of suicidal ideation and/or behaviors.  No safety plan indicated at this time.     Provider Name/ Credentials:  SHANNON Nava, EKATERINA  January 4, 2024

## 2024-01-04 NOTE — TELEPHONE ENCOUNTER
Medication Question or Refill    Contacts         Type Contact Phone/Fax    01/04/2024 01:32 PM CST Phone (Incoming) Joshua Gill Sr. (Self) [phone] (M)            What medication are you calling about (include dose and sig)?: Fluoxetine    Preferred Pharmacy:      [address], [address]  [address] [facility] [address]  [facility] [address]  Phone: [phone] Fax: [phone]      Controlled Substance Agreement on file:   CSA -- Patient Level:    CSA: None found at the patient level.       Who prescribed the medication?: Nini Reza    Do you need a refill? No, wants to change medication to possibly to zoloft. He had an appointment with Martha Ram today    When did you use the medication last?     Patient offered an appointment? No    Do you have any questions or concerns?  Yes: Possible change medications      Could we send this information to you in [facility] or would you prefer to receive a phone call?:   Patient would prefer a phone call   Okay to leave a detailed message?: Yes at Cell number on file:    Telephone Information:   Mobile [phone]      Jana LION [facility]

## 2024-01-04 NOTE — Clinical Note
Dx Adjustment mixed. Working on med adjustments for anxiety and processing through adjustment to [facility]. [facility] only, no frequency made. -Martha, [facility]

## 2024-01-05 RX ORDER — SERTRALINE HYDROCHLORIDE 25 MG/1
25 TABLET, FILM COATED ORAL DAILY
Qty: 30 TABLET | Refills: 0 | Status: SHIPPED | OUTPATIENT
Start: 2024-01-05 | End: 2024-03-29

## 2024-01-16 ENCOUNTER — VIRTUAL VISIT (OUTPATIENT)
Dept: FAMILY MEDICINE | Facility: CLINIC | Age: 64
End: 2024-01-16
Payer: COMMERCIAL

## 2024-01-16 DIAGNOSIS — F43.23 ADJUSTMENT DISORDER WITH MIXED ANXIETY AND DEPRESSED MOOD: Primary | ICD-10-CM

## 2024-01-16 PROCEDURE — 99213 OFFICE O/P EST LOW 20 MIN: CPT | Mod: 95 | Performed by: NURSE PRACTITIONER

## 2024-01-16 RX ORDER — HYDROXYZINE HYDROCHLORIDE 10 MG/1
10 TABLET, FILM COATED ORAL 3 TIMES DAILY PRN
Qty: 60 TABLET | Refills: 0 | Status: SHIPPED | OUTPATIENT
Start: 2024-01-16 | End: 2024-02-15

## 2024-01-16 NOTE — PROGRESS NOTES
"    Joshua is a 63 year old who is being evaluated via a billable video visit.      How would you like to obtain your AVS? Mail a copy  If the video visit is dropped, the invitation should be resent by: Text to cell phone: [phone]  Will anyone else be joining your video visit? No      Assessment & Plan     Adjustment disorder with mixed anxiety and depressed mood  -Patient would prefer not to take any SSRIs for now as he is doing much better with lifestyle changes and he was having issues with side effects. He is interested in a medication he can take as needed \"for stress.\"   - hydrOXYzine HCl (ATARAX) 10 MG tablet  Dispense: 60 tablet; Refill: 0  -follow up in 1 month to check on progress with symptoms or sooner if needed.   -he plans to follow up with [facility] only if needed.       Prescription drug management         BMI:   Estimated body mass index is 28.81 kg/m  as calculated from the following:    Height as of 12/20/23: 1.803 m (5' 11\").    Weight as of 12/20/23: 93.7 kg (206 lb 9.6 oz).           YAJAIRA Chavis CNP  [facility]    Subjective   Joshua is a 63 year old, presenting for the following health issues:  Recheck Medication  - Mentioned he brought up depression at his last visit but states he is more stressed and nervous and thinks he is not depressed  -Discontinued all medications expect BP medications  -When patient went to go  new medication \"sertraline\", the pharmacist mentioned that it is very similar to prozac which Joshua has recently discontinued due to the way it made him feel so he has not started the new Rx that was sent in for him      1/16/2024     1:44 PM   Additional Questions   Roomed by Manan VO   Accompanied by Self       Was having issues with nausea and brain fog on fluoxetine. Switched prescription to sertraline however when patient went to  the medication from the pharmacist he was told it was just like fluoxetine, so patient decided " not to take the sertraline due to concern about side effects. He has stopped taking all medications except his blood pressure medication. Per [facility]'s recommendation after his first therapy appointment he has been trying to go out more and do more activities. He states this has made a big improvement on his mood. He states he doesn't believe he has anxiety or depression, just issues with feeling very stressed since his [facility].          History of Present Illness       Reason for visit:  Follow up    He eats 2-3 servings of fruits and vegetables daily.He consumes 1 sweetened beverage(s) daily.He exercises with enough effort to increase his heart rate 9 or less minutes per day.  He exercises with enough effort to increase his heart rate 3 or less days per week.   He is taking medications regularly.       Medication Followup of Fluoxetine  Taking Medication as prescribed: No, discontinued 1/5  Side Effects:  Feeling foggy and weird feeling  Medication Helping Symptoms:  NO        Review of Systems   Constitutional, HEENT, cardiovascular, pulmonary, gi and gu systems are negative, except as otherwise noted.      Objective             12/20/2023    10:41 AM 1/4/2024    12:22 PM 1/4/2024     2:13 PM   PHQ   PHQ-9 Total Score 17 8 8   Q9: Thoughts of better off dead/self-harm past 2 weeks Not at all Not at all Not at all         1/2/2024     9:59 AM 1/4/2024    12:23 PM 1/4/2024     2:16 PM   ARJUN-7 SCORE   Total Score  18 (severe anxiety) 8 (mild anxiety)   Total Score 18 18 8         Vitals:  No vitals were obtained today due to virtual visit.    Physical Exam   GENERAL: Healthy, alert and no distress  EYES: Eyes grossly normal to inspection.  No discharge or erythema, or obvious scleral/conjunctival abnormalities.  RESP: No audible wheeze, cough, or visible cyanosis.  No visible retractions or increased work of breathing.    SKIN: Visible skin clear. No significant rash, abnormal pigmentation or lesions.  NEURO: Cranial  nerves grossly intact.  Mentation and speech appropriate for age.  PSYCH: Mentation appears normal, affect normal/bright, judgement and insight intact, normal speech and appearance well-groomed.                Video-Visit Details    Type of service:  Video Visit     Originating Location (pt. Location): Home    Distant Location (provider location):  On-site  Platform used for Video Visit: Marcial

## 2024-01-19 ENCOUNTER — MYC REFILL (OUTPATIENT)
Dept: FAMILY MEDICINE | Facility: CLINIC | Age: 64
End: 2024-01-19
Payer: COMMERCIAL

## 2024-01-19 DIAGNOSIS — F43.23 ADJUSTMENT DISORDER WITH MIXED ANXIETY AND DEPRESSED MOOD: ICD-10-CM

## 2024-01-19 DIAGNOSIS — G47.09 OTHER INSOMNIA: ICD-10-CM

## 2024-01-19 RX ORDER — TRAZODONE HYDROCHLORIDE 100 MG/1
100 TABLET ORAL AT BEDTIME
Qty: 90 TABLET | Refills: 0 | Status: SHIPPED | OUTPATIENT
Start: 2024-01-19 | End: 2024-03-29

## 2024-01-19 NOTE — TELEPHONE ENCOUNTER
Called patient to clarify as he told me during his visit on 1/16/24 that he had stopped taking this medication and didn't want to take it anymore. He clarified he stopped taking it for 2 days but now has been unable to sleep so would like to go back on trazodone. He was doing well on 100 mg so I will refill it for 100 mg.

## 2024-02-21 ENCOUNTER — PATIENT OUTREACH (OUTPATIENT)
Dept: CARE COORDINATION | Facility: CLINIC | Age: 64
End: 2024-02-21
Payer: COMMERCIAL

## 2024-03-06 ENCOUNTER — PATIENT OUTREACH (OUTPATIENT)
Dept: CARE COORDINATION | Facility: CLINIC | Age: 64
End: 2024-03-06
Payer: COMMERCIAL

## 2024-03-13 ENCOUNTER — TELEPHONE (OUTPATIENT)
Dept: FAMILY MEDICINE | Facility: CLINIC | Age: 64
End: 2024-03-13
Payer: COMMERCIAL

## 2024-03-13 NOTE — TELEPHONE ENCOUNTER
Patient Quality Outreach    Patient is due for the following:   Physical Preventive Adult Physical      Topic Date Due    Pneumococcal Vaccine (1 of 2 - PCV) Never done    Flu Vaccine (1) 09/01/2023    COVID-19 Vaccine (5 - 2023-24 season) 09/01/2023       Next Steps:   Schedule a Adult Preventative    Type of outreach:    Sent [employer] message.      Questions for provider review:    None           SEVERO LEON MA

## 2024-03-28 SDOH — HEALTH STABILITY: PHYSICAL HEALTH: ON AVERAGE, HOW MANY MINUTES DO YOU ENGAGE IN EXERCISE AT THIS LEVEL?: 20 MIN

## 2024-03-28 SDOH — HEALTH STABILITY: PHYSICAL HEALTH: ON AVERAGE, HOW MANY DAYS PER WEEK DO YOU ENGAGE IN MODERATE TO STRENUOUS EXERCISE (LIKE A BRISK WALK)?: 1 DAY

## 2024-03-28 ASSESSMENT — SOCIAL DETERMINANTS OF HEALTH (SDOH): HOW OFTEN DO YOU GET TOGETHER WITH FRIENDS OR RELATIVES?: ONCE A WEEK

## 2024-03-29 ENCOUNTER — OFFICE VISIT (OUTPATIENT)
Dept: FAMILY MEDICINE | Facility: CLINIC | Age: 64
End: 2024-03-29
Payer: COMMERCIAL

## 2024-03-29 VITALS
SYSTOLIC BLOOD PRESSURE: 124 MMHG | DIASTOLIC BLOOD PRESSURE: 80 MMHG | WEIGHT: 208 LBS | HEART RATE: 88 BPM | RESPIRATION RATE: 16 BRPM | OXYGEN SATURATION: 95 % | HEIGHT: 71 IN | BODY MASS INDEX: 29.12 KG/M2 | TEMPERATURE: 96.4 F

## 2024-03-29 DIAGNOSIS — Z12.5 SCREENING FOR PROSTATE CANCER: ICD-10-CM

## 2024-03-29 DIAGNOSIS — Z12.11 COLON CANCER SCREENING: ICD-10-CM

## 2024-03-29 DIAGNOSIS — I82.432 ACUTE DEEP VEIN THROMBOSIS (DVT) OF POPLITEAL VEIN OF LEFT LOWER EXTREMITY (H): ICD-10-CM

## 2024-03-29 DIAGNOSIS — Z00.00 ROUTINE GENERAL MEDICAL EXAMINATION AT A HEALTH CARE FACILITY: Primary | ICD-10-CM

## 2024-03-29 LAB
ALBUMIN SERPL BCG-MCNC: 4.5 G/DL (ref 3.5–5.2)
ALP SERPL-CCNC: 66 U/L (ref 40–150)
ALT SERPL W P-5'-P-CCNC: 34 U/L (ref 0–70)
ANION GAP SERPL CALCULATED.3IONS-SCNC: 10 MMOL/L (ref 7–15)
AST SERPL W P-5'-P-CCNC: 25 U/L (ref 0–45)
BILIRUB SERPL-MCNC: 0.6 MG/DL
BUN SERPL-MCNC: 24.3 MG/DL (ref 8–23)
CALCIUM SERPL-MCNC: 9.5 MG/DL (ref 8.8–10.2)
CHLORIDE SERPL-SCNC: 104 MMOL/L (ref 98–107)
CHOLEST SERPL-MCNC: 162 MG/DL
CREAT SERPL-MCNC: 0.94 MG/DL (ref 0.67–1.17)
DEPRECATED HCO3 PLAS-SCNC: 25 MMOL/L (ref 22–29)
EGFRCR SERPLBLD CKD-EPI 2021: >90 ML/MIN/1.73M2
FASTING STATUS PATIENT QL REPORTED: YES
GLUCOSE SERPL-MCNC: 138 MG/DL (ref 70–99)
HBA1C MFR BLD: 6 % (ref 0–5.6)
HDLC SERPL-MCNC: 44 MG/DL
LDLC SERPL CALC-MCNC: 100 MG/DL
NONHDLC SERPL-MCNC: 118 MG/DL
POTASSIUM SERPL-SCNC: 4.3 MMOL/L (ref 3.4–5.3)
PROT SERPL-MCNC: 7.3 G/DL (ref 6.4–8.3)
PSA SERPL DL<=0.01 NG/ML-MCNC: 0.64 NG/ML (ref 0–4.5)
SODIUM SERPL-SCNC: 139 MMOL/L (ref 135–145)
TRIGL SERPL-MCNC: 89 MG/DL

## 2024-03-29 PROCEDURE — 99396 PREV VISIT EST AGE 40-64: CPT | Performed by: FAMILY MEDICINE

## 2024-03-29 PROCEDURE — 83036 HEMOGLOBIN GLYCOSYLATED A1C: CPT | Performed by: FAMILY MEDICINE

## 2024-03-29 PROCEDURE — 80053 COMPREHEN METABOLIC PANEL: CPT | Performed by: FAMILY MEDICINE

## 2024-03-29 PROCEDURE — 80061 LIPID PANEL: CPT | Performed by: FAMILY MEDICINE

## 2024-03-29 PROCEDURE — 36415 COLL VENOUS BLD VENIPUNCTURE: CPT | Performed by: FAMILY MEDICINE

## 2024-03-29 PROCEDURE — G0103 PSA SCREENING: HCPCS | Performed by: FAMILY MEDICINE

## 2024-03-29 ASSESSMENT — PAIN SCALES - GENERAL: PAINLEVEL: NO PAIN (0)

## 2024-03-29 NOTE — PROGRESS NOTES
"Preventive Care Visit  [facility]  Abiola Mcgill MD, Family Medicine  Mar 29, 2024      Assessment & Plan     Routine general medical examination at a health care facility  Preventive care reviewed and updated.  Doing well , generally healthy   - Lipid panel reflex to direct LDL Fasting; Future  - Hemoglobin A1c; Future  - Comprehensive metabolic panel; Future    Acute deep vein thrombosis (DVT) of popliteal vein of left lower extremity (H)  Resolved   No current concern   Continue to monitor       Colon cancer screening    - Colonoscopy Screening  Referral; Future    Screening for prostate cancer    - Prostate Specific Antigen Screen; Future    Patient has been advised of split billing requirements and indicates understanding: Yes          BMI  Estimated body mass index is 29.01 kg/m  as calculated from the following:    Height as of this encounter: 1.803 m (5' 11\").    Weight as of this encounter: 94.3 kg (208 lb).   Weight management plan: Discussed healthy diet and exercise guidelines    Counseling  Appropriate preventive services were discussed with this patient, including applicable screening as appropriate for fall prevention, nutrition, physical activity, Tobacco-use cessation, weight loss and cognition.  Checklist reviewing preventive services available has been given to the patient.  Reviewed patient's diet, addressing concerns and/or questions.   He is at risk for lack of exercise and has been provided with information to increase physical activity for the benefit of his well-being.       Work on weight loss  Regular exercise    Subjective   Joshua is a 63 year old, presenting for the following:  Physical (Fasting )        3/29/2024     6:55 AM   Additional Questions   Roomed by Qing JOSEPH   Accompanied by Self     Health Care Directive  Patient does not have a Health Care Directive or Living Will: Discussed advance care planning with patient; however, patient declined at " this time.    HPI          Preventive -     Immunization History   Administered Date(s) Administered    COVID-19 MONOVALENT 12+ (Pfizer) 04/26/2021, 05/17/2021, 12/29/2021    COVID-19 Monovalent 12+ (Pfizer 2022) 06/06/2022    Influenza Vaccine 18-64 (Flublok) 09/23/2020, 09/28/2021    Influenza Vaccine, 6+MO IM (QUADRIVALENT W/PRESERVATIVES) 09/28/2021    TDAP (Adacel,Boostrix) 05/13/2011, 11/03/2021    Zoster recombinant adjuvanted (SHINGRIX) 09/23/2020, 11/24/2020         - Colon CA screen: Colonoscopy, age 45-75 every 10 years or FIT every year or Cologuard every 3 years     - Prostate CA screen: Discussed controversy about screening.   Prostate Specific Antigen Screen   Date Value Ref Range Status   11/20/2021 0.76 0.00 - 4.00 ug/L Final           - Lung cancer screen: IMG 2290 - Asymptomatic, age 55 - 79 years, Current or Former Smoker; if former, must have quit in past 15 years, 30 + pack-year smoking history. Beta carotene use in smokers has been associated with higher risk of lung CA.    Declined CT lung cancer screen     -lipids screen: ordered    Diabetes screen: ordered        Wt Readings from Last 4 Encounters:   03/29/24 94.3 kg (208 lb)   12/20/23 93.7 kg (206 lb 9.6 oz)   12/08/23 93.9 kg (207 lb)   03/22/23 98.9 kg (218 lb)         Background history :      Atypical chest pain, slight trop leak, RVH, atrial enlargement - he presented with non exertional intermittent chest pain of about 2 weeks duration. He has also background left hand numbness likely related to carpal tunnel - see below. Currently he denies chest pain or shortness of breath.  Evaluation and treatment:                     Hospitalized 3/15/23 to 3/16/23 - EKG and CXR fine. Trop leak - highest 0.07.               Difficult to say if this anything significant. The discharge summary mentions NSTEMI.              Either way the echo mentions RVH and bi-atrial enlargement - I think he needs cardiology evaluation - I referred him.      Anemia - denies any bleeding. He says they took a lot of blood in the Hospital.  Evaluation and treatment:                     Baseline hgb has been around 12 since 2021.              On 12/8/21 we did iron studies, B12, folate which were all fine.              On 1/11/22 occult stool blood was negative.              Anemia of chronic disease?              In the Hospital the hgb was 13.1, then 12.0.              Today hgb is at baseline.              Since his hgb is stable with the above work up, we can continue to monitor without further evaluation.            CBC RESULTS: Recent Labs   Lab Test 03/22/23  1732   WBC 6.1   RBC 4.41   HGB 13.1*   HCT 38.2*   MCV 87   MCH 29.7   MCHC 34.3   RDW 13.0         Left carpal tunnel - present for a number of months. There is tingling and sometimes weakness at the left thumb and 2rd and 3rd fingers. This is intermittent. He denies neck problems. At his job there is a lot of repetitive hand movements.  Evaluation and treatment:                     I offered him ortho referral but he would rather hold off for now.              He can wear brace over the counter prn.  No current concern.  Improved after halfway  HTN -   not checking BP at home. Fairly controlled in clinic.  Evaluation and treatment:                     Losartan 25 mg daily - no side effects.              Continue same tx.              In the Hospital 3/15/23 BMP was fine.  Blood pressure is at goal.  Continue losartan 25 mg.  Obesity -               Diet and exercise discussed.              Snores but denies daytime sleepiness.      Body mass index is 29.01 kg/m .  Wt Readings from Last 4 Encounters:   03/29/24 94.3 kg (208 lb)   12/20/23 93.7 kg (206 lb 9.6 oz)   12/08/23 93.9 kg (207 lb)   03/22/23 98.9 kg (218 lb)          Varicose veins, previous left leg DVT -    Evaluation and treatment:              U/S 11/19/20 - left politeal veing DVT extends to proximal calf.              Treated with  Eliquis.                 U/S 10/4/21 negative for DVT              Since unprovoked saw hematology - intermittent prophylaxis was advised with car/plane rides 4 hours or with surgeries.     Scrotal mass -  Evaluation and treatment:                     U/S 10/4/21 - indeterminate mass              Follows with urology     Left shoulder rotator cuff tear -               surgery on 12/1/16      Right shoulder               surgery 10/9/14 with good results.  Right shoulder pain return after  shoveling snow recently.  Normal range of motion  Offered referral to PT, declined at this time.  He will open exercise  If no improvement will refer to orthopedic specialist  H/O Tobacco abuse - started since age 17. Average 1 ppd. Quit smoking 9/14/16.                        Discussed lung CA screen - declines lung CT.        3/28/2024   General Health   How would you rate your overall physical health? Good   Feel stress (tense, anxious, or unable to sleep) To some extent   (!) STRESS CONCERN      3/28/2024   Nutrition   Three or more servings of calcium each day? Yes   Diet: Regular (no restrictions)   How many servings of fruit and vegetables per day? (!) 0-1   How many sweetened beverages each day? 0-1         3/28/2024   Exercise   Days per week of moderate/strenous exercise 1 day   Average minutes spent exercising at this level 20 min   (!) EXERCISE CONCERN      3/28/2024   Social Factors   Frequency of gathering with friends or relatives Once a week   Worry food won't last until get money to buy more No   Food not last or not have enough money for food? No   Do you have housing?  Yes   Are you worried about losing your housing? No   Lack of transportation? No   Unable to get utilities (heat,electricity)? No         3/29/2024   Fall Risk   Gait Speed Test Interpretation Less than or equal to 5.00 seconds - PASS           3/28/2024   Dental   Dentist two times every year? Yes         3/28/2024   TB Screening   Were you born  outside of the [place]? No           Today's PHQ-2 Score:       1/15/2024     8:11 AM   PHQ-2 (  [employer])   Q1: Little interest or pleasure in doing things 1   Q2: Feeling down, depressed or hopeless 0   PHQ-2 Score 1   Q1: Little interest or pleasure in doing things Several days   Q2: Feeling down, depressed or hopeless Not at all   PHQ-2 Score 1         3/28/2024   Substance Use   Alcohol more than 3/day or more than 7/wk No   Do you use any other substances recreationally? (!) ALCOHOL     Social History     Tobacco Use    Smoking status: Former     Packs/day: 0.00     Years: 0.00     Additional pack years: 0.00     Total pack years: 0.00     Types: Cigarettes     Quit date: 2016     Years since quittin.5    Smokeless tobacco: Never   Vaping Use    Vaping Use: Never used   Substance Use Topics    Alcohol use: Yes     Comment: occ.    Drug use: No             3/28/2024   One time HIV Screening   Previous HIV test? I don't know         3/28/2024   STI Screening   New sexual partner(s) since last STI/HIV test? No   Last PSA:   Prostate Specific Antigen Screen   Date Value Ref Range Status   2021 0.76 0.00 - 4.00 ug/L Final     ASCVD Risk   The 10-year ASCVD risk score (Cathi CARDENAS, et al., 2019) is: 14.8%    Values used to calculate the score:      Age: 63 years      Sex: Male      Is Non- : No      Diabetic: No      Tobacco smoker: No      Systolic Blood Pressure: 161 mmHg      Is BP treated: Yes      HDL Cholesterol: 42 mg/dL      Total Cholesterol: 143 mg/dL           Reviewed and updated as needed this visit by Provider                    Past Medical History:   Diagnosis Date    Hypertension     Motion sickness     No pertinent past medical history     PONV (postoperative nausea and vomiting)      Past Surgical History:   Procedure Laterality Date    ARTHROSCOPY SHLDR ROTATOR CUFF REPAIR, SUBACROMIAL DECOMP, DIST CLAVICLE RESECTION, BICEP TENODESIS Left 2016     Procedure: ARTHROSCOPY SHOULDER ROTATOR CUFF REPAIR, SUBACROMIAL DECOMPRESSION, DISTAL CLAVICLE RESECTION, OPEN BICEP TENODESIS REPAIR;  Surgeon: Guru Motta MD;  Location: [facility] OR    ARTHROSCOPY SHOULDER DISTAL CLAVICLE REPAIR Right 10/9/2014    Procedure: ARTHROSCOPY SHOULDER DISTAL CLAVICLE RESECTION;  Surgeon: Guru Motta MD;  Location: [facility] OR    ARTHROSCOPY SHOULDER ROTATOR CUFF REPAIR Right 10/9/2014    Procedure: ARTHROSCOPY SHOULDER ROTATOR CUFF REPAIR;  Surgeon: Guru Motta MD;  Location: [facility] OR    HERNIA REPAIR, INGUINAL RT/LT      as child    NO HISTORY OF SURGERY       Lab work is in process  Labs reviewed in EPIC  BP Readings from Last 3 Encounters:   03/29/24 124/80   12/20/23 138/80   12/08/23 (!) 145/89    Wt Readings from Last 3 Encounters:   03/29/24 94.3 kg (208 lb)   12/20/23 93.7 kg (206 lb 9.6 oz)   12/08/23 93.9 kg (207 lb)                  Patient Active Problem List   Diagnosis    CARDIOVASCULAR SCREENING; LDL GOAL LESS THAN 160    Situational anxiety    Impingement syndrome of right shoulder    SLAP tear of shoulder    Tinea corporis    Complete tear of left rotator cuff    Acute deep vein thrombosis (DVT) of popliteal vein of left lower extremity (H)     Past Surgical History:   Procedure Laterality Date    ARTHROSCOPY SHLDR ROTATOR CUFF REPAIR, SUBACROMIAL DECOMP, DIST CLAVICLE RESECTION, BICEP TENODESIS Left 12/1/2016    Procedure: ARTHROSCOPY SHOULDER ROTATOR CUFF REPAIR, SUBACROMIAL DECOMPRESSION, DISTAL CLAVICLE RESECTION, OPEN BICEP TENODESIS REPAIR;  Surgeon: Guru Motta MD;  Location: [facility]    ARTHROSCOPY SHOULDER DISTAL CLAVICLE REPAIR Right 10/9/2014    Procedure: ARTHROSCOPY SHOULDER DISTAL CLAVICLE RESECTION;  Surgeon: Guru Motta MD;  Location: [facility]    ARTHROSCOPY SHOULDER ROTATOR CUFF REPAIR Right 10/9/2014    Procedure: ARTHROSCOPY SHOULDER ROTATOR CUFF REPAIR;  Surgeon: Guru Motta MD;  Location: [facility]    HERNIA REPAIR, INGUINAL RT/LT   "    as child    NO HISTORY OF SURGERY         Social History     Tobacco Use    Smoking status: Former     Packs/day: 0.00     Years: 0.00     Additional pack years: 0.00     Total pack years: 0.00     Types: Cigarettes     Quit date: 2016     Years since quittin.5    Smokeless tobacco: Never   Substance Use Topics    Alcohol use: Yes     Comment: occ.     Family History   Problem Relation Age of Onset    Cancer Mother     Diabetes Mother     Cancer Paternal Grandfather     Thyroid Disease Sister     Diabetes Maternal Uncle     Hypertension No family hx of     Cerebrovascular Disease No family hx of     Glaucoma No family hx of     Macular Degeneration No family hx of          Current Outpatient Medications   Medication Sig Dispense Refill    cyclobenzaprine (FLEXERIL) 5 MG tablet Take 5 mg by mouth      losartan (COZAAR) 25 MG tablet TAKE 1 TABLET (25 MG) BY MOUTH DAILY 90 tablet 3    triamcinolone (KENALOG) 0.025 % external ointment Apply topically 2 times daily 15 g 0     No Known Allergies  Recent Labs   Lab Test 23  1142 23  1729 21  1108 20  1453   LDL  --   --  89 95   HDL  --   --  42 43   TRIG  --   --  62 86   ALT  --  27  --   --    CR  --  0.92 0.79 0.86   GFRESTIMATED  --  >90 >90 >90   GFRESTBLACK  --   --   --  >90   POTASSIUM  --  4.1 3.8 3.8   TSH 3.69  --   --   --           Review of Systems  Constitutional, HEENT, cardiovascular, pulmonary, gi and gu systems are negative, except as otherwise noted.     Objective    Exam  BP (!) 161/83   Pulse 88   Temp (!) 96.4  F (35.8  C) (Tympanic)   Resp 16   Ht 1.803 m (5' 11\")   Wt 94.3 kg (208 lb)   SpO2 95%   BMI 29.01 kg/m     Estimated body mass index is 29.01 kg/m  as calculated from the following:    Height as of this encounter: 1.803 m (5' 11\").    Weight as of this encounter: 94.3 kg (208 lb).    Physical Exam  GENERAL: alert and no distress  EYES: Eyes grossly normal to inspection, PERRL and conjunctivae and " sclerae normal  HENT: ear canals and TM's normal, nose and mouth without ulcers or lesions  NECK: no adenopathy, no asymmetry, masses, or scars  RESP: lungs clear to auscultation - no rales, rhonchi or wheezes  CV: regular rate and rhythm, normal S1 S2, no S3 or S4, no murmur, click or rub, no peripheral edema  ABDOMEN: soft, nontender, no hepatosplenomegaly, no masses and bowel sounds normal  MS: no gross musculoskeletal defects noted, no edema  SKIN: no suspicious lesions or rashes  NEURO: Normal strength and tone, mentation intact and speech normal  PSYCH: mentation appears normal, affect normal/bright        Signed Electronically by: Abiola Mcgill MD

## 2024-03-29 NOTE — PATIENT INSTRUCTIONS
"Patient Education   Tips for Sleep Hygiene  \"Sleep hygiene\" means having good sleep habits.Follow these tips to sleep better at night:   Get on a schedule. Go to bed and get up at about the same time every day.  Listen to your body. Only try to sleep when you actually feel tired or sleepy.  Be patient. If you haven't been able to get to sleep after about 30 minutes or more, get up and do something calming or boring until you feel sleepy. Then return to bed and try again.  Don't have caffeine (coffee, tea, cola drinks, chocolate and some medicines), alcohol or nicotine (cigarettes). These can make it harder for you to fall asleep and stay asleep.  Use your bed for sleeping only. That means no TV, computer or homework in bed, especially during the evening and before bedtime.  Don't nap during the day. If you must nap, make sure it is for less than 20 minutes.  Create sleep rituals that remind your body it is time to sleep. Examples include breathing exercises, stretching or reading a book.  Avoid all electronic media (smart phone, computer, tablet) within 2 hours of bed time. The \"blue light\" in these devices activates the part of the brain that keeps you awake.  Dim the lights at night.  Get early morning sources of light (walk in the sunshine) to help set sleep patterns at night.  Try a bath or shower before bed. Having a warm bath 1 to 2 hours before bedtime can help you feel sleepy. Hot baths can make you alert, so be mindful of the temperature.  Don't watch the clock. Checking the clock during the night can wake you up. It can also lead to negative thoughts such as, \"I will never fall asleep,\" which can increase anxiety and sleeplessness.  Use a sleep diary. Track your sleep schedule to know your sleep patterns and to see where you can improve.  Get regular exercise every day. Try not to do heavy exercise in the 4 hours before bedtime.  Eat a healthy, balanced diet.  Try eating a light, healthy snack before bed, " but avoid eating a heavy meal.  Create the right sleeping area. A cool, dark, quiet room is best. If needed, try earplugs, fans and blackout curtains.  Keep your daytime routine the same even if you have a bad night sleep. Avoiding activities the next day can make it harder to sleep.  For informational purposes only. Not to replace the advice of your health care provider.   Copyright   2013 [facility]. All rights reserved. [place] 393438 - Rev 04/21.           Preventive Care Advice   This is general advice given by our system to help you stay healthy. However, your care team may have specific advice just for you. Please talk to your care team about your preventive care needs.  Nutrition  Eat 5 or more servings of fruits and vegetables each day.  Try wheat bread, brown rice and whole grain pasta (instead of white bread, rice, and pasta).  Get enough calcium and vitamin D. Check the label on foods and aim for 100% of the RDA (recommended daily allowance).  Lifestyle  Exercise at least 150 minutes each week   (30 minutes a day, 5 days a week).  Do muscle strengthening activities 2 days a week. These help control your weight and prevent disease.  No smoking.  Wear sunscreen to prevent skin cancer.  Have a dental exam and cleaning every 6 months.  Yearly exams  See your health care team every year to talk about:  Any changes in your health.  Any medicines your care team has prescribed.  Preventive care, family planning, and ways to prevent chronic diseases.  Shots (vaccines)   HPV shots (up to age 26), if you've never had them before.  Hepatitis B shots (up to age 59), if you've never had them before.  COVID-19 shot: Get this shot when it's due.  Flu shot: Get a flu shot every year.  Tetanus shot: Get a tetanus shot every 10 years.  Pneumococcal, hepatitis A, and RSV shots: Ask your care team if you need these based on your risk.  Shingles shot (for age 50 and up).  General health tests  Diabetes  screening:  Starting at age 35, Get screened for diabetes at least every 3 years.  If you are younger than age 35, ask your care team if you should be screened for diabetes.  Cholesterol test: At age 39, start having a cholesterol test every 5 years, or more often if advised.  Bone density scan (DEXA): At age 50, ask your care team if you should have this scan for osteoporosis (brittle bones).  Hepatitis C: Get tested at least once in your life.  STIs (sexually transmitted infections)  Before age 24: Ask your care team if you should be screened for STIs.  After age 24: Get screened for STIs if you're at risk. You are at risk for STIs (including HIV) if:  You are sexually active with more than one person.  You don't use condoms every time.  You or a partner was diagnosed with a sexually transmitted infection.  If you are at risk for HIV, ask about PrEP medicine to prevent HIV.  Get tested for HIV at least once in your life, whether you are at risk for HIV or not.  Cancer screening tests  Cervical cancer screening: If you have a cervix, begin getting regular cervical cancer screening tests at age 21. Most people who have regular screenings with normal results can stop after age 65. Talk about this with your provider.  Breast cancer scan (mammogram): If you've ever had breasts, begin having regular mammograms starting at age 40. This is a scan to check for breast cancer.  Colon cancer screening: It is important to start screening for colon cancer at age 45.  Have a colonoscopy test every 10 years (or more often if you're at risk) Or, ask your provider about stool tests like a FIT test every year or Cologuard test every 3 years.  To learn more about your testing options, visit: https://www.[employer]/372407.pdf.  For help making a decision, visit: https://bit.ly/zw76384.  Prostate cancer screening test: If you have a prostate and are age 55 to 69, ask your provider if you would benefit from a yearly prostate cancer  screening test.  Lung cancer screening: If you are a current or former smoker age 50 to 80, ask your care team if ongoing lung cancer screenings are right for you.  For informational purposes only. Not to replace the advice of your health care provider. Copyright   2023 [facility]. All rights reserved. Clinically reviewed by the [facility]. [place] 166543 - REV 01/24.    Preventing Falls: Care Instructions  Injuries and health problems such as trouble walking or poor eyesight can increase your risk of falling. So can some medicines. But there are things you can do to help prevent falls. You can exercise to get stronger. You can also arrange your home to make it safer.    Talk to your doctor about the medicines you take. Ask if any of them increase the risk of falls and whether they can be changed or stopped.   Try to exercise regularly. It can help improve your strength and balance. This can help lower your risk of falling.     Practice fall safety and prevention.    Wear low-heeled shoes that fit well and give your feet good support. Talk to your doctor if you have foot problems that make this hard.  Carry a cellphone or wear a medical alert device that you can use to call for help.  Use stepladders instead of chairs to reach high objects. Don't climb if you're at risk for falls. Ask for help, if needed.  Wear the correct eyeglasses, if you need them.    Make your home safer.    Remove rugs, cords, clutter, and furniture from walkways.  Keep your house well lit. Use night-lights in hallways and bathrooms.  Install and use sturdy handrails on stairways.  Wear nonskid footwear, even inside. Don't walk barefoot or in socks without shoes.    Be safe outside.    Use handrails, curb cuts, and ramps whenever possible.  Keep your hands free by using a shoulder bag or backpack.  Try to walk in well-lit areas. Watch out for uneven ground, changes in pavement, and debris.  Be  "careful in the winter. Walk on the grass or gravel when sidewalks are slippery. Use de-icer on steps and walkways. Add non-slip devices to shoes.    Put grab bars and nonskid mats in your shower or tub and near the toilet. Try to use a shower chair or bath bench when bathing.   Get into a tub or shower by putting in your weaker leg first. Get out with your strong side first. Have a phone or medical alert device in the bathroom with you.   Where can you learn more?  Go to https://www.[employer].net/patiented  Enter G117 in the search box to learn more about \"Preventing Falls: Care Instructions.\"  Current as of: July 17, 2023               Content Version: 14.0    6833-5892 [employer].   Care instructions adapted under license by your healthcare professional. If you have questions about a medical condition or this instruction, always ask your healthcare professional. [employer] disclaims any warranty or liability for your use of this information.      Learning About Stress  What is stress?     Stress is your body's response to a hard situation. Your body can have a physical, emotional, or mental response. Stress is a fact of life for most people, and it affects everyone differently. What causes stress for you may not be stressful for someone else.  A lot of things can cause stress. You may feel stress when you go on a job interview, take a test, or run a race. This kind of short-term stress is normal and even useful. It can help you if you need to work hard or react quickly. For example, stress can help you finish an important job on time.  Long-term stress is caused by ongoing stressful situations or events. Examples of long-term stress include long-term health problems, ongoing problems at work, or conflicts in your family. Long-term stress can harm your health.  How does stress affect your health?  When you are stressed, your body responds as though you are in danger. It makes hormones " that speed up your heart, make you breathe faster, and give you a burst of energy. This is called the fight-or-flight stress response. If the stress is over quickly, your body goes back to normal and no harm is done.  But if stress happens too often or lasts too long, it can have bad effects. Long-term stress can make you more likely to get sick, and it can make symptoms of some diseases worse. If you tense up when you are stressed, you may develop neck, shoulder, or low back pain. Stress is linked to high blood pressure and heart disease.  Stress also harms your emotional health. It can make you juarez, tense, or depressed. Your relationships may suffer, and you may not do well at work or school.  What can you do to manage stress?  You can try these things to help manage stress:   Do something active. Exercise or activity can help reduce stress. Walking is a great way to get started. Even everyday activities such as housecleaning or yard work can help.  Try yoga or satish chi. These techniques combine exercise and meditation. You may need some training at first to learn them.  Do something you enjoy. For example, listen to music or go to a movie. Practice your hobby or do volunteer work.  Meditate. This can help you relax, because you are not worrying about what happened before or what may happen in the future.  Do guided imagery. Imagine yourself in any setting that helps you feel calm. You can use online videos, books, or a teacher to guide you.  Do breathing exercises. For example:  From a standing position, bend forward from the waist with your knees slightly bent. Let your arms dangle close to the floor.  Breathe in slowly and deeply as you return to a standing position. Roll up slowly and lift your head last.  Hold your breath for just a few seconds in the standing position.  Breathe out slowly and bend forward from the waist.  Let your feelings out. Talk, laugh, cry, and express anger when you need to. Talking  "with supportive friends or family, a counselor, or a dania leader about your feelings is a healthy way to relieve stress. Avoid discussing your feelings with people who make you feel worse.  Write. It may help to write about things that are bothering you. This helps you find out how much stress you feel and what is causing it. When you know this, you can find better ways to cope.  What can you do to prevent stress?  You might try some of these things to help prevent stress:  Manage your time. This helps you find time to do the things you want and need to do.  Get enough sleep. Your body recovers from the stresses of the day while you are sleeping.  Get support. Your family, friends, and community can make a difference in how you experience stress.  Limit your news feed. Avoid or limit time on social media or news that may make you feel stressed.  Do something active. Exercise or activity can help reduce stress. Walking is a great way to get started.  Where can you learn more?  Go to https://www.[employer].net/patiented  Enter N032 in the search box to learn more about \"Learning About Stress.\"  Current as of: October 24, 2023               Content Version: 14.0    3172-4127 [employer].   Care instructions adapted under license by your healthcare professional. If you have questions about a medical condition or this instruction, always ask your healthcare professional. [employer] disclaims any warranty or liability for your use of this information.      Substance Use Disorder: Care Instructions  Overview     You can improve your life and health by stopping your use of alcohol or drugs. When you don't drink or use drugs, you may feel and sleep better. You may get along better with your family, friends, and coworkers. There are medicines and programs that can help with substance use disorder.  How can you care for yourself at home?  Here are some ways to help you stay sober and prevent " relapse.  If you have been given medicine to help keep you sober or reduce your cravings, be sure to take it exactly as prescribed.  Talk to your doctor about programs that can help you stop using drugs or drinking alcohol.  Do not keep alcohol or drugs in your home.  Plan ahead. Think about what you'll say if other people ask you to drink or use drugs. Try not to spend time with people who drink or use drugs.  Use the time and money spent on drinking or drugs to do something that's important to you.  Preventing a relapse  Have a plan to deal with relapse. Learn to recognize changes in your thinking that lead you to drink or use drugs. Get help before you start to drink or use drugs again.  Try to stay away from situations, friends, or places that may lead you to drink or use drugs.  If you feel the need to drink alcohol or use drugs again, seek help right away. Call a trusted friend or family member. Some people get support from organizations such as Narcotics Anonymous or [employer] or from treatment facilities.  If you relapse, get help as soon as you can. Some people make a plan with another person that outlines what they want that person to do for them if they relapse. The plan usually includes how to handle the relapse and who to notify in case of relapse.  Don't give up. Remember that a relapse doesn't mean that you have failed. Use the experience to learn the triggers that lead you to drink or use drugs. Then quit again. Recovery is a lifelong process. Many people have several relapses before they are able to quit for good.  Follow-up care is a key part of your treatment and safety. Be sure to make and go to all appointments, and call your doctor if you are having problems. It's also a good idea to know your test results and keep a list of the medicines you take.  When should you call for help?   Call 911  anytime you think you may need emergency care. For example, call if you or someone else:    Has  "overdosed or has withdrawal signs. Be sure to tell the emergency workers that you are or someone else is using or trying to quit using drugs. Overdose or withdrawal signs may include:  Losing consciousness.  Seizure.  Seeing or hearing things that aren't there (hallucinations).     Is thinking or talking about suicide or harming others.   Where to get help 24 hours a day, 7 days a week   If you or someone you know talks about suicide, self-harm, a mental health crisis, a substance use crisis, or any other kind of emotional distress, get help right away. You can:    Call the Suicide and Crisis Lifeline at 988.     Call 8-999-962-TALK (1-600.849.5831).     Text HOME to 291923 to access the Crisis Text Line.   Consider saving these numbers in your phone.  Go to [employer] for more information or to chat online.  Call your doctor now or seek immediate medical care if:    You are having withdrawal symptoms. These may include nausea or vomiting, sweating, shakiness, and anxiety.   Watch closely for changes in your health, and be sure to contact your doctor if:    You have a relapse.     You need more help or support to stop.   Where can you learn more?  Go to https://www.[employer].net/patiented  Enter H573 in the search box to learn more about \"Substance Use Disorder: Care Instructions.\"  Current as of: November 15, 2023               Content Version: 14.0    5903-5251 [employer].   Care instructions adapted under license by your healthcare professional. If you have questions about a medical condition or this instruction, always ask your healthcare professional. Healthwise, [employer] disclaims any warranty or liability for your use of this information.      "

## 2024-04-08 DIAGNOSIS — I10 HYPERTENSION GOAL BP (BLOOD PRESSURE) < 140/90: ICD-10-CM

## 2024-04-08 RX ORDER — LOSARTAN POTASSIUM 25 MG/1
TABLET ORAL
Qty: 90 TABLET | Refills: 2 | Status: SHIPPED | OUTPATIENT
Start: 2024-04-08

## 2024-04-15 ENCOUNTER — TELEPHONE (OUTPATIENT)
Dept: GASTROENTEROLOGY | Facility: CLINIC | Age: 64
End: 2024-04-15
Payer: COMMERCIAL

## 2024-04-15 NOTE — TELEPHONE ENCOUNTER
"Endoscopy Scheduling Screen    Have you had a positive Covid test in the last 14 days?  No    What is your communication preference for Instructions and/or Bowel Prep?   MyChart    What insurance is in the chart?  Other:  BCBS    Ordering/Referring Provider: Abiola Mcgill MD   (If ordering provider performs procedure, schedule with ordering provider unless otherwise instructed. )    BMI: Estimated body mass index is 29.01 kg/m  as calculated from the following:    Height as of 3/29/24: 1.803 m (5' 11\").    Weight as of 3/29/24: 94.3 kg (208 lb).     Sedation Ordered  moderate sedation.   If patient BMI > 50 do not schedule in [facility].    If patient BMI > 45 do not schedule at [facility].    Are you taking methadone or Suboxone?  No    Have you had difficulties, pain, or discomfort during past endoscopy procedures?  No    Are you taking any prescription medications for pain 3 or more times per week?   NO, No RN review required.    Do you have a history of malignant hyperthermia?  No    (Females) Are you currently pregnant?   No     Have you been diagnosed or told you have pulmonary hypertension?   No    Do you have an LVAD?  No    Have you been told you have moderate to severe sleep apnea?  No    Have you been told you have COPD, asthma, or any other lung disease?  No    Do you have any heart conditions?  No     Have you ever had or are you waiting for an organ transplant?  No. Continue scheduling, no site restrictions.    Have you had a stroke or transient ischemic attack (TIA aka \"mini stroke\" in the last 6 months?   No    Have you been diagnosed with or been told you have cirrhosis of the liver?   No    Are you currently on dialysis?   No    Do you need assistance transferring?   No    BMI: Estimated body mass index is 29.01 kg/m  as calculated from the following:    Height as of 3/29/24: 1.803 m (5' 11\").    Weight as of 3/29/24: 94.3 kg (208 lb).     Is patients BMI > 40 and scheduling location [facility]?  No    Do you take " an injectable medication for weight loss or diabetes (excluding insulin)?  No    Do you take the medication Naltrexone?  No    Do you take blood thinners?  No       Prep   Are you currently on dialysis or do you have chronic kidney disease?  No    Do you have a diagnosis of diabetes?  No    Do you have a diagnosis of cystic fibrosis (CF)?  No    On a regular basis do you go 3 -5 days between bowel movements?  No    BMI > 40?  No    Preferred Pharmacy:      [facility] [place] [address] [address] [facility], [facility] [address]  [address] [facility] [address]  [facility] [address]  Phone: [phone] Fax: [phone]      Final Scheduling Details     Procedure scheduled  Colonoscopy    Surgeon:  Dee     Date of procedure:  06/12/2024     Pre-OP / PAC:   No - Not required for this site.    Location  [facility] - [facility] - Patient preference.    Sedation   Moderate Sedation - Per order.      Patient Reminders:   You will receive a call from a Nurse to review instructions and health history.  This assessment must be completed prior to your procedure.  Failure to complete the Nurse assessment may result in the procedure being cancelled.      On the day of your procedure, please designate an adult(s) who can drive you home stay with you for the next 24 hours. The medicines used in the exam will make you sleepy. You will not be able to drive.      You cannot take public transportation, ride share services, or non-medical taxi service without a responsible caregiver.  Medical transport services are allowed with the requirement that a responsible caregiver will receive you at your destination.  We require that drivers and caregivers are confirmed prior to your procedure.

## 2024-06-04 ENCOUNTER — TELEPHONE (OUTPATIENT)
Dept: GASTROENTEROLOGY | Facility: CLINIC | Age: 64
End: 2024-06-04
Payer: COMMERCIAL

## 2024-06-04 NOTE — TELEPHONE ENCOUNTER
Pre assessment completed for upcoming procedure.      Procedure details:    Patient scheduled for Colonoscopy  on 6.12.24.     Arrival time: 0900. Procedure time 0945    Facility location: [facility]; [address], [place] [address]. Check in location: 2nd Floor at Surgery desk.    Sedation type: Conscious sedation     Pre op exam needed? N/A    Indication for procedure: screening    Designated  policy reviewed. Instructed to have someone stay 6 hours post procedure.       Chart review:     Electronic implanted devices? No    Recent diagnosis of diverticulitis within the last 6 weeks?  No    Diabetic? No      Medication review:    Anticoagulants? No    NSAIDS? No    Other medication HOLDING recommendations:  N/A      Prep for procedure:     Bowel prep recommendation: Standard Miralax  Due to: standard bowel prep.    Prep instructions sent via [employer]     Reviewed procedure prep instructions.     Patient verbalized understanding and had no questions or concerns at this time.        Lidia Cheung RN  Endoscopy Procedure Pre Assessment   [phone] option 4

## 2024-06-04 NOTE — TELEPHONE ENCOUNTER
Pre visit planning completed.      Procedure details:    Patient scheduled for Colonoscopy  on 6.12.2024.     Arrival time: 0900. Procedure time 0945    Facility location: [facility]; [address], [place] [address]. Check in location: 2nd Floor at Surgery desk.    Sedation type: Conscious sedation     Pre op exam needed? N/A    Indication for procedure: screening colonoscopy      Chart review:     Electronic implanted devices? No    Recent diagnosis of diverticulitis within the last 6 weeks? No    Diabetic? No      Medication review:    Anticoagulants? No    NSAIDS? No NSAID medications per patient's medication list.  RN will verify with pre-assessment call.    Other medication HOLDING recommendations:  N/A      Prep for procedure:     Bowel prep recommendation: Standard Miralax  Due to: standard bowel prep.    Prep instructions sent via [employer] by [facility] team.         Luci Okeefe RN  Endoscopy Procedure Pre Assessment RN  [phone] option 4

## 2024-06-12 ENCOUNTER — HOSPITAL ENCOUNTER (OUTPATIENT)
Facility: AMBULATORY SURGERY CENTER | Age: 64
Discharge: HOME | End: 2024-06-12
Attending: STUDENT IN AN ORGANIZED HEALTH CARE EDUCATION/TRAINING PROGRAM | Admitting: STUDENT IN AN ORGANIZED HEALTH CARE EDUCATION/TRAINING PROGRAM
Payer: COMMERCIAL

## 2024-06-12 VITALS
DIASTOLIC BLOOD PRESSURE: 74 MMHG | RESPIRATION RATE: 16 BRPM | HEART RATE: 75 BPM | OXYGEN SATURATION: 96 % | TEMPERATURE: 96.1 F | SYSTOLIC BLOOD PRESSURE: 114 MMHG

## 2024-06-12 DIAGNOSIS — Z12.11 COLON CANCER SCREENING: Primary | ICD-10-CM

## 2024-06-12 LAB — COLONOSCOPY: NORMAL

## 2024-06-12 PROCEDURE — G8918 PT W/O PREOP ORDER IV AB PRO: HCPCS

## 2024-06-12 PROCEDURE — 45385 COLONOSCOPY W/LESION REMOVAL: CPT

## 2024-06-12 PROCEDURE — 45380 COLONOSCOPY AND BIOPSY: CPT | Mod: XS

## 2024-06-12 PROCEDURE — G8907 PT DOC NO EVENTS ON DISCHARG: HCPCS

## 2024-06-12 PROCEDURE — 88305 TISSUE EXAM BY PATHOLOGIST: CPT | Performed by: PATHOLOGY

## 2024-06-12 RX ORDER — LIDOCAINE HYDROCHLORIDE 20 MG/ML
JELLY TOPICAL PRN
Status: DISCONTINUED | OUTPATIENT
Start: 2024-06-12 | End: 2024-06-12 | Stop reason: HOSPADM

## 2024-06-12 RX ORDER — NALOXONE HYDROCHLORIDE 0.4 MG/ML
0.4 INJECTION, SOLUTION INTRAMUSCULAR; INTRAVENOUS; SUBCUTANEOUS
Status: DISCONTINUED | OUTPATIENT
Start: 2024-06-12 | End: 2024-06-13 | Stop reason: HOSPADM

## 2024-06-12 RX ORDER — ONDANSETRON 2 MG/ML
4 INJECTION INTRAMUSCULAR; INTRAVENOUS
Status: DISCONTINUED | OUTPATIENT
Start: 2024-06-12 | End: 2024-06-13 | Stop reason: HOSPADM

## 2024-06-12 RX ORDER — NALOXONE HYDROCHLORIDE 0.4 MG/ML
0.2 INJECTION, SOLUTION INTRAMUSCULAR; INTRAVENOUS; SUBCUTANEOUS
Status: DISCONTINUED | OUTPATIENT
Start: 2024-06-12 | End: 2024-06-13 | Stop reason: HOSPADM

## 2024-06-12 RX ORDER — PROCHLORPERAZINE MALEATE 10 MG
10 TABLET ORAL EVERY 6 HOURS PRN
Status: DISCONTINUED | OUTPATIENT
Start: 2024-06-12 | End: 2024-06-13 | Stop reason: HOSPADM

## 2024-06-12 RX ORDER — FLUMAZENIL 0.1 MG/ML
0.2 INJECTION, SOLUTION INTRAVENOUS
Status: ACTIVE | OUTPATIENT
Start: 2024-06-12 | End: 2024-06-12

## 2024-06-12 RX ORDER — FENTANYL CITRATE 50 UG/ML
INJECTION, SOLUTION INTRAMUSCULAR; INTRAVENOUS PRN
Status: DISCONTINUED | OUTPATIENT
Start: 2024-06-12 | End: 2024-06-12 | Stop reason: HOSPADM

## 2024-06-12 RX ORDER — ONDANSETRON 4 MG/1
4 TABLET, ORALLY DISINTEGRATING ORAL EVERY 6 HOURS PRN
Status: DISCONTINUED | OUTPATIENT
Start: 2024-06-12 | End: 2024-06-13 | Stop reason: HOSPADM

## 2024-06-12 RX ORDER — ONDANSETRON 2 MG/ML
4 INJECTION INTRAMUSCULAR; INTRAVENOUS EVERY 6 HOURS PRN
Status: DISCONTINUED | OUTPATIENT
Start: 2024-06-12 | End: 2024-06-13 | Stop reason: HOSPADM

## 2024-06-12 RX ORDER — LIDOCAINE 40 MG/G
CREAM TOPICAL
Status: DISCONTINUED | OUTPATIENT
Start: 2024-06-12 | End: 2024-06-13 | Stop reason: HOSPADM

## 2024-06-12 NOTE — H&P
Pre-Endoscopy History and Physical     Joshua Gill . MRN# [id number]   YOB: 1960 Age: 64 year old     Date of Procedure: 6/12/2024  Primary care provider: Abiola Mcgill  Type of Endoscopy: colonoscopy  Reason for Procedure: screening  Type of Anesthesia Anticipated: Moderate Sedation    HPI:    Joshua is a 64 year old male who will be undergoing the above procedure.      A history and physical has been performed. The patient's medications and allergies have been reviewed. The risks and benefits of the procedure and the sedation options and risks were discussed with the patient.  I specifically discussed about possible sedation medication reaction, bleeding, and one of the more rare complications of perforation.  All questions were answered and informed consent was obtained.      He denies a personal or family history of anesthesia complications or bleeding disorders.     No Known Allergies     Cannot display prior to admission medications because the patient has not been admitted in this contact.       Patient Active Problem List   Diagnosis    CARDIOVASCULAR SCREENING; LDL GOAL LESS THAN 160    Situational anxiety    Impingement syndrome of right shoulder    SLAP tear of shoulder    Tinea corporis    Complete tear of left rotator cuff    Acute deep vein thrombosis (DVT) of popliteal vein of left lower extremity (H)        Past Medical History:   Diagnosis Date    Hypertension     Motion sickness     No pertinent past medical history     PONV (postoperative nausea and vomiting)         Past Surgical History:   Procedure Laterality Date    ARTHROSCOPY SHLDR ROTATOR CUFF REPAIR, SUBACROMIAL DECOMP, DIST CLAVICLE RESECTION, BICEP TENODESIS Left 12/1/2016    Procedure: ARTHROSCOPY SHOULDER ROTATOR CUFF REPAIR, SUBACROMIAL DECOMPRESSION, DISTAL CLAVICLE RESECTION, OPEN BICEP TENODESIS REPAIR;  Surgeon: Guru Motta MD;  Location: [facility] OR    ARTHROSCOPY SHOULDER DISTAL CLAVICLE REPAIR Right  "10/9/2014    Procedure: ARTHROSCOPY SHOULDER DISTAL CLAVICLE RESECTION;  Surgeon: Guru Motta MD;  Location: [facility] OR    ARTHROSCOPY SHOULDER ROTATOR CUFF REPAIR Right 10/9/2014    Procedure: ARTHROSCOPY SHOULDER ROTATOR CUFF REPAIR;  Surgeon: Guru Motta MD;  Location: [facility] OR    HERNIA REPAIR, INGUINAL RT/LT      as child    NO HISTORY OF SURGERY         Social History     Tobacco Use    Smoking status: Former     Current packs/day: 0.00     Types: Cigarettes     Quit date: 2016     Years since quittin.7    Smokeless tobacco: Never   Substance Use Topics    Alcohol use: Yes     Comment: occ.       Family History   Problem Relation Age of Onset    Cancer Mother     Diabetes Mother     Cancer Paternal Grandfather     Thyroid Disease Sister     Diabetes Maternal Uncle     Hypertension No family hx of     Cerebrovascular Disease No family hx of     Glaucoma No family hx of     Macular Degeneration No family hx of        REVIEW OF SYSTEMS:     5 point ROS negative except as noted above in HPI, including Gen., Resp., CV, GI &  system review.      PHYSICAL EXAM:   /85   Temp (!) 96.1  F (35.6  C) (Temporal)   Resp 18   SpO2 94%  Estimated body mass index is 29.01 kg/m  as calculated from the following:    Height as of 3/29/24: 1.803 m (5' 11\").    Weight as of 3/29/24: 94.3 kg (208 lb).   GENERAL APPEARANCE: healthy, alert, and no distress  MENTAL STATUS: alert  AIRWAY EXAM: Mallampatti Class II (visualization of the soft palate, fauces, and uvula)  RESP: lungs clear to auscultation - no rales, rhonchi or wheezes  CV: regular rates and rhythm, normal S1 S2, no S3 or S4, no murmur, click or rub, and no irregular beats      DIAGNOSTICS:    Not indicated      IMPRESSION   ASA Class 2 - Mild systemic disease        PLAN:       Plan for colonoscopy. We discussed the risks, benefits and alternatives and the patient wished to proceed.    The above has been forwarded to the consulting " provider.      Signed Electronically by: CAMACHO NAQVI MD  June 12, 2024

## 2024-06-14 LAB
PATH REPORT.COMMENTS IMP SPEC: NORMAL
PATH REPORT.FINAL DX SPEC: NORMAL
PATH REPORT.GROSS SPEC: NORMAL
PATH REPORT.MICROSCOPIC SPEC OTHER STN: NORMAL
PATH REPORT.RELEVANT HX SPEC: NORMAL
PHOTO IMAGE: NORMAL

## 2024-06-14 NOTE — RESULT ENCOUNTER NOTE
Joshua,    The type of polyp that was removed during your colonoscopy is a tubular adenoma. This is not a cancer.  This is the type of polyp that sometimes will turn into one.      This polyp is out and will not cause you any further problems.      You should have another colonoscopy in 7 years to evaluate for other polyps.      Please let me know if you have any questions.       Thank you,    Ortiz Price MD

## 2024-06-26 ENCOUNTER — PATIENT OUTREACH (OUTPATIENT)
Dept: GASTROENTEROLOGY | Facility: CLINIC | Age: 64
End: 2024-06-26
Payer: COMMERCIAL

## 2025-01-07 DIAGNOSIS — I10 HYPERTENSION GOAL BP (BLOOD PRESSURE) < 140/90: ICD-10-CM

## 2025-01-07 RX ORDER — LOSARTAN POTASSIUM 25 MG/1
TABLET ORAL
Qty: 90 TABLET | Refills: 0 | Status: SHIPPED | OUTPATIENT
Start: 2025-01-07

## 2025-04-04 DIAGNOSIS — I10 HYPERTENSION GOAL BP (BLOOD PRESSURE) < 140/90: ICD-10-CM

## 2025-04-06 RX ORDER — LOSARTAN POTASSIUM 25 MG/1
25 TABLET ORAL
Qty: 90 TABLET | Refills: 0 | Status: SHIPPED | OUTPATIENT
Start: 2025-04-06

## 2025-04-16 SDOH — HEALTH STABILITY: PHYSICAL HEALTH: ON AVERAGE, HOW MANY DAYS PER WEEK DO YOU ENGAGE IN MODERATE TO STRENUOUS EXERCISE (LIKE A BRISK WALK)?: 5 DAYS

## 2025-04-16 SDOH — HEALTH STABILITY: PHYSICAL HEALTH: ON AVERAGE, HOW MANY MINUTES DO YOU ENGAGE IN EXERCISE AT THIS LEVEL?: 60 MIN

## 2025-04-16 ASSESSMENT — SOCIAL DETERMINANTS OF HEALTH (SDOH): HOW OFTEN DO YOU GET TOGETHER WITH FRIENDS OR RELATIVES?: ONCE A WEEK

## 2025-04-21 ENCOUNTER — OFFICE VISIT (OUTPATIENT)
Dept: FAMILY MEDICINE | Facility: CLINIC | Age: 65
End: 2025-04-21
Attending: FAMILY MEDICINE
Payer: COMMERCIAL

## 2025-04-21 VITALS
HEIGHT: 71 IN | HEART RATE: 81 BPM | OXYGEN SATURATION: 97 % | DIASTOLIC BLOOD PRESSURE: 71 MMHG | BODY MASS INDEX: 29.4 KG/M2 | SYSTOLIC BLOOD PRESSURE: 121 MMHG | WEIGHT: 210 LBS | TEMPERATURE: 97.8 F | RESPIRATION RATE: 16 BRPM

## 2025-04-21 DIAGNOSIS — I10 HYPERTENSION GOAL BP (BLOOD PRESSURE) < 140/90: ICD-10-CM

## 2025-04-21 DIAGNOSIS — F43.23 ADJUSTMENT DISORDER WITH MIXED ANXIETY AND DEPRESSED MOOD: ICD-10-CM

## 2025-04-21 DIAGNOSIS — G89.29 CHRONIC BACK PAIN, UNSPECIFIED BACK LOCATION, UNSPECIFIED BACK PAIN LATERALITY: ICD-10-CM

## 2025-04-21 DIAGNOSIS — Z00.00 ROUTINE GENERAL MEDICAL EXAMINATION AT A HEALTH CARE FACILITY: Primary | ICD-10-CM

## 2025-04-21 DIAGNOSIS — Z12.5 SCREENING FOR PROSTATE CANCER: ICD-10-CM

## 2025-04-21 DIAGNOSIS — M54.9 CHRONIC BACK PAIN, UNSPECIFIED BACK LOCATION, UNSPECIFIED BACK PAIN LATERALITY: ICD-10-CM

## 2025-04-21 LAB
ALBUMIN SERPL BCG-MCNC: 4.3 G/DL (ref 3.5–5.2)
ALP SERPL-CCNC: 68 U/L (ref 40–150)
ALT SERPL W P-5'-P-CCNC: 33 U/L (ref 0–70)
ANION GAP SERPL CALCULATED.3IONS-SCNC: 9 MMOL/L (ref 7–15)
AST SERPL W P-5'-P-CCNC: 32 U/L (ref 0–45)
BASOPHILS # BLD AUTO: 0 10E3/UL (ref 0–0.2)
BASOPHILS NFR BLD AUTO: 1 %
BILIRUB SERPL-MCNC: 0.5 MG/DL
BUN SERPL-MCNC: 22.3 MG/DL (ref 8–23)
CALCIUM SERPL-MCNC: 9.4 MG/DL (ref 8.8–10.4)
CHLORIDE SERPL-SCNC: 105 MMOL/L (ref 98–107)
CHOLEST SERPL-MCNC: 147 MG/DL
CREAT SERPL-MCNC: 0.97 MG/DL (ref 0.67–1.17)
EGFRCR SERPLBLD CKD-EPI 2021: 88 ML/MIN/1.73M2
EOSINOPHIL # BLD AUTO: 0.1 10E3/UL (ref 0–0.7)
EOSINOPHIL NFR BLD AUTO: 1 %
ERYTHROCYTE [DISTWIDTH] IN BLOOD BY AUTOMATED COUNT: 12.3 % (ref 10–15)
EST. AVERAGE GLUCOSE BLD GHB EST-MCNC: 123 MG/DL
FASTING STATUS PATIENT QL REPORTED: NO
FASTING STATUS PATIENT QL REPORTED: NO
GLUCOSE SERPL-MCNC: 111 MG/DL (ref 70–99)
HBA1C MFR BLD: 5.9 % (ref 0–5.6)
HCO3 SERPL-SCNC: 27 MMOL/L (ref 22–29)
HCT VFR BLD AUTO: 41.7 % (ref 40–53)
HDLC SERPL-MCNC: 47 MG/DL
HGB BLD-MCNC: 14.1 G/DL (ref 13.3–17.7)
IMM GRANULOCYTES # BLD: 0 10E3/UL
IMM GRANULOCYTES NFR BLD: 0 %
LDLC SERPL CALC-MCNC: 82 MG/DL
LYMPHOCYTES # BLD AUTO: 1.9 10E3/UL (ref 0.8–5.3)
LYMPHOCYTES NFR BLD AUTO: 30 %
MCH RBC QN AUTO: 29 PG (ref 26.5–33)
MCHC RBC AUTO-ENTMCNC: 33.8 G/DL (ref 31.5–36.5)
MCV RBC AUTO: 86 FL (ref 78–100)
MONOCYTES # BLD AUTO: 0.6 10E3/UL (ref 0–1.3)
MONOCYTES NFR BLD AUTO: 9 %
NEUTROPHILS # BLD AUTO: 3.8 10E3/UL (ref 1.6–8.3)
NEUTROPHILS NFR BLD AUTO: 59 %
NONHDLC SERPL-MCNC: 100 MG/DL
PLATELET # BLD AUTO: 225 10E3/UL (ref 150–450)
POTASSIUM SERPL-SCNC: 4.4 MMOL/L (ref 3.4–5.3)
PROT SERPL-MCNC: 7 G/DL (ref 6.4–8.3)
PSA SERPL DL<=0.01 NG/ML-MCNC: 1.68 NG/ML (ref 0–4.5)
RBC # BLD AUTO: 4.87 10E6/UL (ref 4.4–5.9)
SODIUM SERPL-SCNC: 141 MMOL/L (ref 135–145)
TRIGL SERPL-MCNC: 89 MG/DL
WBC # BLD AUTO: 6.4 10E3/UL (ref 4–11)

## 2025-04-21 PROCEDURE — 3074F SYST BP LT 130 MM HG: CPT | Performed by: FAMILY MEDICINE

## 2025-04-21 PROCEDURE — G0103 PSA SCREENING: HCPCS | Performed by: FAMILY MEDICINE

## 2025-04-21 PROCEDURE — 83036 HEMOGLOBIN GLYCOSYLATED A1C: CPT | Performed by: FAMILY MEDICINE

## 2025-04-21 PROCEDURE — 99396 PREV VISIT EST AGE 40-64: CPT | Performed by: FAMILY MEDICINE

## 2025-04-21 PROCEDURE — 80053 COMPREHEN METABOLIC PANEL: CPT | Performed by: FAMILY MEDICINE

## 2025-04-21 PROCEDURE — 99214 OFFICE O/P EST MOD 30 MIN: CPT | Mod: 25 | Performed by: FAMILY MEDICINE

## 2025-04-21 PROCEDURE — 36415 COLL VENOUS BLD VENIPUNCTURE: CPT | Performed by: FAMILY MEDICINE

## 2025-04-21 PROCEDURE — 80061 LIPID PANEL: CPT | Performed by: FAMILY MEDICINE

## 2025-04-21 PROCEDURE — 3078F DIAST BP <80 MM HG: CPT | Performed by: FAMILY MEDICINE

## 2025-04-21 PROCEDURE — 85025 COMPLETE CBC W/AUTO DIFF WBC: CPT | Performed by: FAMILY MEDICINE

## 2025-04-21 PROCEDURE — 1126F AMNT PAIN NOTED NONE PRSNT: CPT | Performed by: FAMILY MEDICINE

## 2025-04-21 RX ORDER — CYCLOBENZAPRINE HCL 10 MG
10 TABLET ORAL 2 TIMES DAILY PRN
Qty: 90 TABLET | Refills: 0 | Status: SHIPPED | OUTPATIENT
Start: 2025-04-21

## 2025-04-21 ASSESSMENT — PAIN SCALES - GENERAL: PAINLEVEL_OUTOF10: NO PAIN (0)

## 2025-04-21 NOTE — PATIENT INSTRUCTIONS
Patient Education   Preventive Care Advice   This is general advice given by our system to help you stay healthy. However, your care team may have specific advice just for you. Please talk to your care team about your preventive care needs.  Nutrition  Eat 5 or more servings of fruits and vegetables each day.  Try wheat bread, brown rice and whole grain pasta (instead of white bread, rice, and pasta).  Get enough calcium and vitamin D. Check the label on foods and aim for 100% of the RDA (recommended daily allowance).  Lifestyle  Exercise at least 150 minutes each week  (30 minutes a day, 5 days a week).  Do muscle strengthening activities 2 days a week. These help control your weight and prevent disease.  No smoking.  Wear sunscreen to prevent skin cancer.  Have a dental exam and cleaning every 6 months.  Yearly exams  See your health care team every year to talk about:  Any changes in your health.  Any medicines your care team has prescribed.  Preventive care, family planning, and ways to prevent chronic diseases.  Shots (vaccines)   HPV shots (up to age 26), if you've never had them before.  Hepatitis B shots (up to age 59), if you've never had them before.  COVID-19 shot: Get this shot when it's due.  Flu shot: Get a flu shot every year.  Tetanus shot: Get a tetanus shot every 10 years.  Pneumococcal, hepatitis A, and RSV shots: Ask your care team if you need these based on your risk.  Shingles shot (for age 50 and up)  General health tests  Diabetes screening:  Starting at age 35, Get screened for diabetes at least every 3 years.  If you are younger than age 35, ask your care team if you should be screened for diabetes.  Cholesterol test: At age 39, start having a cholesterol test every 5 years, or more often if advised.  Bone density scan (DEXA): At age 50, ask your care team if you should have this scan for osteoporosis (brittle bones).  Hepatitis C: Get tested at least once in your life.  STIs (sexually  transmitted infections)  Before age 24: Ask your care team if you should be screened for STIs.  After age 24: Get screened for STIs if you're at risk. You are at risk for STIs (including HIV) if:  You are sexually active with more than one person.  You don't use condoms every time.  You or a partner was diagnosed with a sexually transmitted infection.  If you are at risk for HIV, ask about PrEP medicine to prevent HIV.  Get tested for HIV at least once in your life, whether you are at risk for HIV or not.  Cancer screening tests  Cervical cancer screening: If you have a cervix, begin getting regular cervical cancer screening tests starting at age 21.  Breast cancer scan (mammogram): If you've ever had breasts, begin having regular mammograms starting at age 40. This is a scan to check for breast cancer.  Colon cancer screening: It is important to start screening for colon cancer at age 45.  Have a colonoscopy test every 10 years (or more often if you're at risk) Or, ask your provider about stool tests like a FIT test every year or Cologuard test every 3 years.  To learn more about your testing options, visit:   .  For help making a decision, visit:   https://bit.ly/mu38953.  Prostate cancer screening test: If you have a prostate, ask your care team if a prostate cancer screening test (PSA) at age 55 is right for you.  Lung cancer screening: If you are a current or former smoker ages 50 to 80, ask your care team if ongoing lung cancer screenings are right for you.  For informational purposes only. Not to replace the advice of your health care provider. Copyright   2023 [facility]. All rights reserved. Clinically reviewed by the [facility]. [employer] [id number] - REV 01/24.  Learning About Stress  What is stress?     Stress is your body's response to a hard situation. Your body can have a physical, emotional, or mental response. Stress is a fact of life for most people, and it  affects everyone differently. What causes stress for you may not be stressful for someone else.  A lot of things can cause stress. You may feel stress when you go on a job interview, take a test, or run a race. This kind of short-term stress is normal and even useful. It can help you if you need to work hard or react quickly. For example, stress can help you finish an important job on time.  Long-term stress is caused by ongoing stressful situations or events. Examples of long-term stress include long-term health problems, ongoing problems at work, or conflicts in your family. Long-term stress can harm your health.  How does stress affect your health?  When you are stressed, your body responds as though you are in danger. It makes hormones that speed up your heart, make you breathe faster, and give you a burst of energy. This is called the fight-or-flight stress response. If the stress is over quickly, your body goes back to normal and no harm is done.  But if stress happens too often or lasts too long, it can have bad effects. Long-term stress can make you more likely to get sick, and it can make symptoms of some diseases worse. If you tense up when you are stressed, you may develop neck, shoulder, or low back pain. Stress is linked to high blood pressure and heart disease.  Stress also harms your emotional health. It can make you juarez, tense, or depressed. Your relationships may suffer, and you may not do well at work or school.  What can you do to manage stress?  You can try these things to help manage stress:   Do something active. Exercise or activity can help reduce stress. Walking is a great way to get started. Even everyday activities such as housecleaning or yard work can help.  Try yoga or satish chi. These techniques combine exercise and meditation. You may need some training at first to learn them.  Do something you enjoy. For example, listen to music or go to a movie. Practice your hobby or do volunteer  "work.  Meditate. This can help you relax, because you are not worrying about what happened before or what may happen in the future.  Do guided imagery. Imagine yourself in any setting that helps you feel calm. You can use online videos, books, or a teacher to guide you.  Do breathing exercises. For example:  From a standing position, bend forward from the waist with your knees slightly bent. Let your arms dangle close to the floor.  Breathe in slowly and deeply as you return to a standing position. Roll up slowly and lift your head last.  Hold your breath for just a few seconds in the standing position.  Breathe out slowly and bend forward from the waist.  Let your feelings out. Talk, laugh, cry, and express anger when you need to. Talking with supportive friends or family, a counselor, or a dania leader about your feelings is a healthy way to relieve stress. Avoid discussing your feelings with people who make you feel worse.  Write. It may help to write about things that are bothering you. This helps you find out how much stress you feel and what is causing it. When you know this, you can find better ways to cope.  What can you do to prevent stress?  You might try some of these things to help prevent stress:  Manage your time. This helps you find time to do the things you want and need to do.  Get enough sleep. Your body recovers from the stresses of the day while you are sleeping.  Get support. Your family, friends, and community can make a difference in how you experience stress.  Limit your news feed. Avoid or limit time on social media or news that may make you feel stressed.  Do something active. Exercise or activity can help reduce stress. Walking is a great way to get started.  Where can you learn more?  Go to https://www.[employer].net/patiented  Enter N032 in the search box to learn more about \"Learning About Stress.\"  Current as of: October 24, 2024  Content Version: 14.4 2024-2025 [employer].   Care instructions adapted under license by your healthcare professional. If you have questions about a medical condition or this instruction, always ask your healthcare professional. [employer] disclaims any warranty or liability for your use of this information.

## 2025-04-21 NOTE — PROGRESS NOTES
Preventive Care Visit  [facility]  Abiola Mcgill MD, Family Medicine  Apr 21, 2025      Assessment & Plan     Routine general medical examination at a health care facility  Preventive care reviewed and updated.  Health maintenance screening and immunizations reviewed with the patient.    We discussed healthy lifestyle, nutrition, cardiovascular risk reduction.  - Screen Labs ordered   - Continue great active lifestyle  - Follow up yearly for the annual physical and preventive care.    - CBC with Platelets & Differential; Future  - Comprehensive metabolic panel; Future  - Hemoglobin A1c; Future  - Lipid panel reflex to direct LDL Non-fasting; Future  - CBC with Platelets & Differential  - Comprehensive metabolic panel  - Hemoglobin A1c  - Lipid panel reflex to direct LDL Non-fasting    Adjustment disorder with mixed anxiety and depressed mood  Symptoms started after prison about 2 years ago.  He was seen previously for this and was prescribed Prozac and Zoloft.  He stopped Prozac because this was not working, it looks like he has not started Zoloft 25 mg yet.  - sertraline (ZOLOFT) 50 MG tablet; Take 1 tablet (50 mg) by mouth daily.  Advised to continue Zoloft 25 mg for 1 month, then increase the dose to 50 mg, he had psychotherapy previously which did not help per patient.    Hypertension goal BP (blood pressure) < 140/90  He blood pressure is well-controlled  Currently on losartan 25 mg.  Chronic back pain, unspecified back location, unspecified back pain laterality  Has been taking Flexeril 5 mg, doing well requesting refill of Flexeril and also increase the dose to 10 mg  - cyclobenzaprine (FLEXERIL) 10 MG tablet; Take 1 tablet (10 mg) by mouth 2 times daily as needed for muscle spasms.    Screening for prostate cancer    - Prostate Specific Antigen Screen; Future  - Prostate Specific Antigen Screen            BMI  Estimated body mass index is 29.29 kg/m  as calculated from the  "following:    Height as of this encounter: 1.803 m (5' 11\").    Weight as of this encounter: 95.3 kg (210 lb).   Weight management plan: Discussed healthy diet and exercise guidelines    Counseling  Appropriate preventive services were addressed with this patient via screening, questionnaire, or discussion as appropriate for fall prevention, nutrition, physical activity, Tobacco-use cessation, social engagement, weight loss and cognition.  Checklist reviewing preventive services available has been given to the patient.  Reviewed patient's diet, addressing concerns and/or questions.   He is at risk for psychosocial distress and has been provided with information to reduce risk.       Follow-up    Follow-up Visit   Expected date:  Apr 21, 2026 (Approximate)      Follow Up Appointment Details:     Follow-up with whom?: PCP    Follow-Up for what?: Adult Preventive    How?: In Person                 Zachery Lewis is a 64 year old, presenting for the following:  Physical        4/21/2025     9:36 AM   Additional Questions   Roomed by Anne          HPI    Not sleeping well. Falls asleep ok but can't stay asleep. Has tried trazodone and melatonin - neither worked for him. Started about a year ago when he retired. Feels lost without working, trouble finding stuff to keep him busy.     Got bed at 10:30 pm , wake up at 3 am     Wt Readings from Last 4 Encounters:   04/21/25 95.3 kg (210 lb)   03/29/24 94.3 kg (208 lb)   12/20/23 93.7 kg (206 lb 9.6 oz)   12/08/23 93.9 kg (207 lb)       Preventive -     Immunization History   Administered Date(s) Administered    COVID-19 MONOVALENT 12+ (Pfizer) 04/26/2021, 05/17/2021, 12/29/2021    COVID-19 Monovalent 12+ (Pfizer 2022) 06/06/2022    Influenza Vaccine 18-64 (Flublok) 09/23/2020, 09/28/2021    Influenza Vaccine, 6+MO IM (QUADRIVALENT W/PRESERVATIVES) 09/28/2021    TDAP (Adacel,Boostrix) 05/13/2011, 11/03/2021    Zoster recombinant adjuvanted (Shingrix) 09/23/2020, " 11/24/2020       - Colon CA screen: Colonoscopy, age 45-75 every 10 years or FIT every year or Cologuard every 3 years   Had colonoscopy in 06/2024   7 years   Next 2031       - Prostate CA screen: Discussed controversy about screening.   Prostate Specific Antigen Screen   Date Value Ref Range Status   03/29/2024 0.64 0.00 - 4.50 ng/mL Final   11/20/2021 0.76 0.00 - 4.00 ug/L Final         - Lung cancer screen: IM 2290 - Asymptomatic, age 55 - 79 years, Current or Former Smoker; if former, must have quit in past 15 years, 30 + pack-year smoking history. Beta carotene use in smokers has been associated with higher risk of lung CA.      Declined lung cancer screen     -lipids screen: ordered     Diabetes screen: ordered             Atypical chest pain, slight trop leak, RVH, atrial enlargement - he presented with non exertional intermittent chest pain of about 2 weeks duration. He has also background left hand numbness likely related to carpal tunnel - see below. Currently he denies chest pain or shortness of breath.  Evaluation and treatment:                     Hospitalized 3/15/23 to 3/16/23 - EKG and CXR fine. Trop leak - highest 0.07.               Difficult to say if this anything significant. The discharge summary mentions NSTEMI.              Either way the echo mentions RVH and bi-atrial enlargement - I think he needs cardiology evaluation - I referred him.     Anemia - denies any bleeding. He says they took a lot of blood in the Hospital.  Evaluation and treatment:                     Baseline hgb has been around 12 since 2021.              On 12/8/21 we did iron studies, B12, folate which were all fine.              On 1/11/22 occult stool blood was negative.              Anemia of chronic disease?              In the Hospital the hgb was 13.1, then 12.0.              Today hgb is at baseline.              Since his hgb is stable with the above work up, we can continue to monitor without further  evaluation.              CBC RESULTS: Recent Labs   Lab Test 03/22/23  1732   WBC 6.1   RBC 4.41   HGB 13.1*   HCT 38.2*   MCV 87   MCH 29.7   MCHC 34.3   RDW 13.0         Left carpal tunnel - present for a number of months. There is tingling and sometimes weakness at the left thumb and 2rd and 3rd fingers. This is intermittent. He denies neck problems. At his job there is a lot of repetitive hand movements.  Evaluation and treatment:                     I offered him ortho referral but he would rather hold off for now.              He can wear brace over the counter prn.  No current concern.  Improved after residential  HTN -   not checking BP at home. Fairly controlled in clinic.  Evaluation and treatment:                     Losartan 25 mg daily - no side effects.              Continue same tx.              In the Hospital 3/15/23 BMP was fine.  Blood pressure is at goal.  Continue losartan 25 mg.  Obesity -               Diet and exercise discussed.              Snores but denies daytime sleepiness.      Body mass index is 29.01 kg/m .      Wt Readings from Last 4 Encounters:   03/29/24 94.3 kg (208 lb)   12/20/23 93.7 kg (206 lb 9.6 oz)   12/08/23 93.9 kg (207 lb)   03/22/23 98.9 kg (218 lb)            Varicose veins, previous left leg DVT -    Evaluation and treatment:              U/S 11/19/20 - left politeal veing DVT extends to proximal calf.              Treated with Eliquis.                 U/S 10/4/21 negative for DVT              Since unprovoked saw hematology - intermittent prophylaxis was advised with car/plane rides 4 hours or with surgeries.     Scrotal mass -  Evaluation and treatment:                     U/S 10/4/21 - indeterminate mass              Follows with urology     Left shoulder rotator cuff tear -               surgery on 12/1/16      Right shoulder               surgery 10/9/14 with good results.  Right shoulder pain return after  shoveling snow recently.  Normal range of  motion  Offered referral to PT, declined at this time.  He will open exercise  If no improvement will refer to orthopedic specialist  H/O Tobacco abuse - started since age 17. Average 1 ppd. Quit smoking 9/14/16.                        Discussed lung CA screen - declines lung CT.             Advance Care Planning    Discussed advance care planning with patient; however, patient declined at this time.        4/16/2025   General Health   How would you rate your overall physical health? Good   Feel stress (tense, anxious, or unable to sleep) Very much   (!) STRESS CONCERN      4/16/2025   Nutrition   Three or more servings of calcium each day? Yes   Diet: I don't know   How many servings of fruit and vegetables per day? (!) 0-1   How many sweetened beverages each day? 0-1         4/16/2025   Exercise   Days per week of moderate/strenous exercise 5 days   Average minutes spent exercising at this level 60 min         4/16/2025   Social Factors   Frequency of gathering with friends or relatives Once a week   Worry food won't last until get money to buy more No   Food not last or not have enough money for food? No   Do you have housing? (Housing is defined as stable permanent housing and does not include staying ouside in a car, in a tent, in an abandoned building, in an overnight shelter, or couch-surfing.) Yes   Are you worried about losing your housing? No   Lack of transportation? No   Unable to get utilities (heat,electricity)? No         4/16/2025   Fall Risk   Fallen 2 or more times in the past year? No   Trouble with walking or balance? No          4/16/2025   Dental   Dentist two times every year? Yes         Today's PHQ-2 Score:       4/20/2025    10:27 AM   PHQ-2 ( 1999 Pfizer)   Q1: Little interest or pleasure in doing things 1   Q2: Feeling down, depressed or hopeless 1   PHQ-2 Score 2    Q1: Little interest or pleasure in doing things Several days   Q2: Feeling down, depressed or hopeless Several days   PHQ-2  Score 2       Patient-reported           2025   Substance Use   Alcohol more than 3/day or more than 7/wk No   Do you use any other substances recreationally? No     Social History     Tobacco Use    Smoking status: Former     Current packs/day: 0.00     Types: Cigarettes     Quit date: 2016     Years since quittin.6    Smokeless tobacco: Never   Vaping Use    Vaping status: Never Used   Substance Use Topics    Alcohol use: Yes     Comment: occ.    Drug use: No             2025   One time HIV Screening   Previous HIV test? No         2025   STI Screening   New sexual partner(s) since last STI/HIV test? No   Last PSA:   Prostate Specific Antigen Screen   Date Value Ref Range Status   2024 0.64 0.00 - 4.50 ng/mL Final   2021 0.76 0.00 - 4.00 ug/L Final     ASCVD Risk   The 10-year ASCVD risk score (Cathi CARDENAS, et al., 2019) is: 10.7%    Values used to calculate the score:      Age: 64 years      Sex: Male      Is Non- : No      Diabetic: No      Tobacco smoker: No      Systolic Blood Pressure: 121 mmHg      Is BP treated: Yes      HDL Cholesterol: 44 mg/dL      Total Cholesterol: 162 mg/dL           Reviewed and updated as needed this visit by Provider     Meds                Past Medical History:   Diagnosis Date    Hypertension     Motion sickness     No pertinent past medical history     PONV (postoperative nausea and vomiting)      Past Surgical History:   Procedure Laterality Date    ARTHROSCOPY SHLDR ROTATOR CUFF REPAIR, SUBACROMIAL DECOMP, DIST CLAVICLE RESECTION, BICEP TENODESIS Left 2016    Procedure: ARTHROSCOPY SHOULDER ROTATOR CUFF REPAIR, SUBACROMIAL DECOMPRESSION, DISTAL CLAVICLE RESECTION, OPEN BICEP TENODESIS REPAIR;  Surgeon: Guru Motta MD;  Location:  OR    ARTHROSCOPY SHOULDER DISTAL CLAVICLE REPAIR Right 10/9/2014    Procedure: ARTHROSCOPY SHOULDER DISTAL CLAVICLE RESECTION;  Surgeon: Guru Motta MD;   Location: [facility] OR    ARTHROSCOPY SHOULDER ROTATOR CUFF REPAIR Right 10/9/2014    Procedure: ARTHROSCOPY SHOULDER ROTATOR CUFF REPAIR;  Surgeon: Guru Motta MD;  Location: [facility] OR    COLONOSCOPY N/A 6/12/2024    Procedure: COLONOSCOPY, FLEXIBLE, WITH LESION REMOVAL USING SNARE;  Surgeon: Ortiz Price MD;  Location: [facility]    COLONOSCOPY N/A 6/12/2024    Procedure: COLONOSCOPY, WITH POLYPECTOMY AND BIOPSY;  Surgeon: Ortiz Price MD;  Location: [facility]    COLONOSCOPY WITH CO2 INSUFFLATION N/A 6/12/2024    Procedure: Colonoscopy with CO2 insufflation;  Surgeon: Ortiz Price MD;  Location: [facility] OR    HERNIA REPAIR, INGUINAL RT/LT      as child    NO HISTORY OF SURGERY       Lab work is in process  BP Readings from Last 3 Encounters:   04/21/25 121/71   06/12/24 114/74   03/29/24 124/80    Wt Readings from Last 3 Encounters:   04/21/25 95.3 kg (210 lb)   03/29/24 94.3 kg (208 lb)   12/20/23 93.7 kg (206 lb 9.6 oz)                  Patient Active Problem List   Diagnosis    CARDIOVASCULAR SCREENING; LDL GOAL LESS THAN 160    Situational anxiety    Impingement syndrome of right shoulder    SLAP tear of shoulder    Tinea corporis    Complete tear of left rotator cuff    Acute deep vein thrombosis (DVT) of popliteal vein of left lower extremity (H)     Past Surgical History:   Procedure Laterality Date    ARTHROSCOPY SHLDR ROTATOR CUFF REPAIR, SUBACROMIAL DECOMP, DIST CLAVICLE RESECTION, BICEP TENODESIS Left 12/1/2016    Procedure: ARTHROSCOPY SHOULDER ROTATOR CUFF REPAIR, SUBACROMIAL DECOMPRESSION, DISTAL CLAVICLE RESECTION, OPEN BICEP TENODESIS REPAIR;  Surgeon: Guru Motta MD;  Location: [facility]    ARTHROSCOPY SHOULDER DISTAL CLAVICLE REPAIR Right 10/9/2014    Procedure: ARTHROSCOPY SHOULDER DISTAL CLAVICLE RESECTION;  Surgeon: Guru Motta MD;  Location: [facility]    ARTHROSCOPY SHOULDER ROTATOR CUFF REPAIR Right 10/9/2014    Procedure: ARTHROSCOPY SHOULDER ROTATOR CUFF REPAIR;  Surgeon:  Guru Motta MD;  Location: [facility]    COLONOSCOPY N/A 2024    Procedure: COLONOSCOPY, FLEXIBLE, WITH LESION REMOVAL USING SNARE;  Surgeon: Ortiz Price MD;  Location: [facility]    COLONOSCOPY N/A 2024    Procedure: COLONOSCOPY, WITH POLYPECTOMY AND BIOPSY;  Surgeon: Ortiz Price MD;  Location: [facility]    COLONOSCOPY WITH CO2 INSUFFLATION N/A 2024    Procedure: Colonoscopy with CO2 insufflation;  Surgeon: Ortiz Price MD;  Location: [facility]    HERNIA REPAIR, INGUINAL RT/LT      as child    NO HISTORY OF SURGERY         Social History     Tobacco Use    Smoking status: Former     Current packs/day: 0.00     Types: Cigarettes     Quit date: 2016     Years since quittin.6    Smokeless tobacco: Never   Substance Use Topics    Alcohol use: Yes     Comment: occ.     Family History   Problem Relation Age of Onset    Cancer Mother     Diabetes Mother     Cancer Paternal Grandfather     Thyroid Disease Sister     Diabetes Maternal Uncle     Hypertension No family hx of     Cerebrovascular Disease No family hx of     Glaucoma No family hx of     Macular Degeneration No family hx of          Current Outpatient Medications   Medication Sig Dispense Refill    cyclobenzaprine (FLEXERIL) 10 MG tablet Take 1 tablet (10 mg) by mouth 2 times daily as needed for muscle spasms. 90 tablet 0    losartan (COZAAR) 25 MG tablet TAKE 1 TABLET (25 MG) BY MOUTH DAILY 90 tablet 0    sertraline (ZOLOFT) 50 MG tablet Take 1 tablet (50 mg) by mouth daily. 90 tablet 1    triamcinolone (KENALOG) 0.025 % external ointment Apply topically 2 times daily 15 g 0     No Known Allergies  Recent Labs   Lab Test 25  1018 24  0736 23  1142 23  1729 21  1108 21  1108 20  1453   A1C 5.9* 6.0*  --   --   --   --   --    LDL  --  100  --   --   --  89 95   HDL  --  44  --   --   --  42 43   TRIG  --  89  --   --   --  62 86   ALT  --  34  --  27  --   --   --    CR  --  0.94   "--  0.92   < > 0.79 0.86   GFRESTIMATED  --  >90  --  >90   < > >90 >90   GFRESTBLACK  --   --   --   --   --   --  >90   POTASSIUM  --  4.3  --  4.1  --  3.8 3.8   TSH  --   --  3.69  --   --   --   --     < > = values in this interval not displayed.               Review of Systems  Constitutional, HEENT, cardiovascular, pulmonary, gi and gu systems are negative, except as otherwise noted.     Objective    Exam  /71   Pulse 81   Temp 97.8  F (36.6  C) (Tympanic)   Resp 16   Ht 1.803 m (5' 11\")   Wt 95.3 kg (210 lb)   SpO2 97%   BMI 29.29 kg/m     Estimated body mass index is 29.29 kg/m  as calculated from the following:    Height as of this encounter: 1.803 m (5' 11\").    Weight as of this encounter: 95.3 kg (210 lb).    Physical Exam  GENERAL: alert and no distress  NECK: no adenopathy, no asymmetry, masses, or scars  RESP: lungs clear to auscultation - no rales, rhonchi or wheezes  CV: regular rate and rhythm, normal S1 S2, no S3 or S4, no murmur, click or rub, no peripheral edema  ABDOMEN: soft, nontender, no hepatosplenomegaly, no masses and bowel sounds normal  MS: no gross musculoskeletal defects noted, no edema        Signed Electronically by: Abiola Mcgill MD    "

## 2025-05-09 ENCOUNTER — RESULTS FOLLOW-UP (OUTPATIENT)
Dept: FAMILY MEDICINE | Facility: CLINIC | Age: 65
End: 2025-05-09

## 2025-06-30 DIAGNOSIS — I10 HYPERTENSION GOAL BP (BLOOD PRESSURE) < 140/90: ICD-10-CM

## 2025-06-30 RX ORDER — LOSARTAN POTASSIUM 25 MG/1
25 TABLET ORAL DAILY
Qty: 90 TABLET | Refills: 2 | Status: SHIPPED | OUTPATIENT
Start: 2025-06-30

## (undated) DEVICE — ENDO SNARE OVAL 2.5X4.0CM W/25GA NDL

## (undated) DEVICE — ENDO CATH ESOPH/PYL/COLONIC BALLOON 10-11-12MMX240CM CRE

## (undated) DEVICE — ENDO CATH ESOPH BALLOON 06-7-8MMX180CM CRE FIXED WIRE

## (undated) DEVICE — ENDO FORCEP ENDOJAW BIOPSY 2.0MMX155CM FB-221K

## (undated) DEVICE — ENDO CATH ESOPH BALLOON 10-11-12MMX180CM CRE FIXED WIRE

## (undated) DEVICE — SYR 50ML CATH TIP W/O NDL 309620

## (undated) DEVICE — SPECIMEN TRAP 80ML BUSSE BW407

## (undated) DEVICE — SWAB PROCTO 16" NON-STERILE

## (undated) DEVICE — ENDO CATH ESOPH BALLOON 18-19-20MMX180CM CRE FIXED WIRE

## (undated) DEVICE — SPECIMEN CONTAINER 60MLW/10% FORMALIN 59601

## (undated) DEVICE — ENDO CATH ESOPH BALLOON 12-13.5-15MMX180CM CRE FIXED WIRE

## (undated) DEVICE — ENDO MARKER SPOT 5ML

## (undated) DEVICE — DEVICE RETRIEVAL ROTH NET PLATINUM UNIV 2.5MMX230CM 00715050

## (undated) DEVICE — ESU ERBE FIAPC PROBE 2.3MMX220CM

## (undated) DEVICE — CUP DENTURE 7.5OZ GOLD DISP

## (undated) DEVICE — ENDO CATH ESOPH/PYL/COLONIC BALLOON 18-19-20MMX240CM CRE

## (undated) DEVICE — SUCTION TIP YANKAUER W/O VENT K86

## (undated) DEVICE — ENDO CATH ESOPH BALLOON 08-9-10MMX180CM CRE FIXED WIRE

## (undated) DEVICE — ENDO CATH ESOPH BALLOON 15-16.5-18MMX180CM CRE FIXED WIRE

## (undated) DEVICE — Device

## (undated) DEVICE — SYR INFLATOR AND GAUGE 60ML M00550601

## (undated) DEVICE — ENDO CATH ESOPH/PYL/COLONIC BALLOON 08-9-10MMX240CM CRE

## (undated) DEVICE — SUCTION CANISTER MEDIVAC LINER 1500ML W/LID 65651-515

## (undated) DEVICE — ENDO FORCEP ENDOJAW BIOPSY 2.8MMX230CM FB-220U

## (undated) DEVICE — SOL WATER IRRIG 1000ML BOTTLE 07139-09

## (undated) DEVICE — ENDO CATH ESOPH/PYL/COLONIC BALLOON 12-13.5-15MMX240CM CRE

## (undated) DEVICE — SPECIMEN TRAP VACUUM SUCTION 003984-901

## (undated) DEVICE — SYR 50ML SLIP TIP W/O NDL 309654

## (undated) DEVICE — ENDO CATH ESOPH/PYL/COLONIC BALLOON 15-16.5-18MMX240CM CRE

## (undated) RX ORDER — LIDOCAINE HYDROCHLORIDE 20 MG/ML
JELLY TOPICAL
Status: DISPENSED
Start: 2024-06-12

## (undated) RX ORDER — FENTANYL CITRATE 50 UG/ML
INJECTION, SOLUTION INTRAMUSCULAR; INTRAVENOUS
Status: DISPENSED
Start: 2024-06-12